# Patient Record
Sex: MALE | Race: WHITE | NOT HISPANIC OR LATINO | Employment: OTHER | URBAN - METROPOLITAN AREA
[De-identification: names, ages, dates, MRNs, and addresses within clinical notes are randomized per-mention and may not be internally consistent; named-entity substitution may affect disease eponyms.]

---

## 2018-07-09 ENCOUNTER — OFFICE VISIT (OUTPATIENT)
Dept: FAMILY MEDICINE CLINIC | Facility: CLINIC | Age: 73
End: 2018-07-09
Payer: MEDICARE

## 2018-07-09 VITALS
HEART RATE: 68 BPM | HEIGHT: 67 IN | OXYGEN SATURATION: 98 % | WEIGHT: 168 LBS | RESPIRATION RATE: 14 BRPM | SYSTOLIC BLOOD PRESSURE: 142 MMHG | TEMPERATURE: 98.3 F | BODY MASS INDEX: 26.37 KG/M2 | DIASTOLIC BLOOD PRESSURE: 78 MMHG

## 2018-07-09 DIAGNOSIS — S51.812D LACERATION OF LEFT FOREARM, SUBSEQUENT ENCOUNTER: Primary | ICD-10-CM

## 2018-07-09 DIAGNOSIS — I10 ESSENTIAL HYPERTENSION: ICD-10-CM

## 2018-07-09 PROCEDURE — 99214 OFFICE O/P EST MOD 30 MIN: CPT | Performed by: INTERNAL MEDICINE

## 2018-07-09 RX ORDER — IBRUTINIB 280 MG/1
TABLET, FILM COATED ORAL
COMMUNITY
Start: 2018-06-26 | End: 2020-01-23 | Stop reason: ALTCHOICE

## 2018-07-09 RX ORDER — NEBIVOLOL 10 MG/1
TABLET ORAL EVERY 24 HOURS
COMMUNITY
Start: 2017-06-27 | End: 2018-08-31 | Stop reason: SURG

## 2018-07-09 RX ORDER — TAMSULOSIN HYDROCHLORIDE 0.4 MG/1
CAPSULE ORAL
COMMUNITY
Start: 2017-01-09 | End: 2020-01-23 | Stop reason: SDUPTHER

## 2018-07-09 RX ORDER — VALSARTAN 160 MG/1
TABLET ORAL
COMMUNITY
Start: 2018-04-25 | End: 2018-07-16

## 2018-07-09 RX ORDER — LEVOFLOXACIN 500 MG/1
500 TABLET, FILM COATED ORAL EVERY 24 HOURS
Qty: 10 TABLET | Refills: 0 | Status: SHIPPED | OUTPATIENT
Start: 2018-07-09 | End: 2018-07-16 | Stop reason: SURG

## 2018-07-09 NOTE — PROGRESS NOTES
Assessment/Plan:         Diagnoses and all orders for this visit:    Laceration of left forearm, subsequent encounter :   Dress up with bacitracin Oint  Keep area dry and Clean   stop keflex  Start :   -     levofloxacin (LEVAQUIN) 500 mg tablet; Take 1 tablet (500 mg total) by mouth every 24 hours for 10 days  No work for 1 week  Elevat Arm as possible  Baby Asa 81 mg daily food   Home P T  For left shoulder /elbow/and wrist  RTc in 1 week for Suture Removal    Essential hypertension : stable  Cont same  Samples of Bystolic 10 mg daily given    Other orders  -     nebivolol (BYSTOLIC) 10 mg tablet; every 24 hours  -     tamsulosin (FLOMAX) 0 4 mg; take 1 capsule by oral route once daily  -     IMBRUVICA 280 MG TABS;   -     valsartan (DIOVAN) 160 mg tablet;   -     Diclofenac Sodium  MG 24 hr tablet;         Subjective:      Patient ID: Franci Espana is a 71 y o  male  Nice 71 Y O man with multiple Medical problems, was doing fairly well till 3 days ago while was working was hit by piece of wood in left forearm, was seen in Er for nice size wound, needed interna and external Suturing    Can not take Td Vaccine due to allergy  Joycie Pata He feels Ok  No New Symptoms  He is On keflex   Joycie Pata The following portions of the patient's history were reviewed and updated as appropriate: allergies, current medications, past family history, past medical history, past social history, past surgical history and problem list     Review of Systems   Constitutional: Negative for chills, fatigue and fever  HENT: Negative for congestion, facial swelling, sore throat, trouble swallowing and voice change  Eyes: Negative for pain, discharge and visual disturbance  Respiratory: Negative for cough, shortness of breath and wheezing  Cardiovascular: Negative for chest pain, palpitations and leg swelling  Gastrointestinal: Negative for abdominal pain, blood in stool, constipation, diarrhea and nausea     Endocrine: Negative for polydipsia, polyphagia and polyuria  Genitourinary: Negative for difficulty urinating, hematuria and urgency  Musculoskeletal: Positive for joint swelling  Negative for arthralgias and myalgias  3 days ago, when Pt  Was working , he injured his left Forearm, and was seen in ER, had Internal and External Sutures,   And was given Keflex    He has allergy to Td vaccine  Syble Kian His left Forearm, is swelling, Echymotic, But no signs of infection    No Other symptoms at all   Skin: Negative for rash  Neurological: Negative for dizziness, tremors, weakness and headaches  Hematological: Negative for adenopathy  Does not bruise/bleed easily  Psychiatric/Behavioral: Negative for dysphoric mood, sleep disturbance and suicidal ideas  Objective:      /78 (BP Location: Right arm, Patient Position: Sitting, Cuff Size: Adult)   Pulse 68   Temp 98 3 °F (36 8 °C) (Oral)   Resp 14   Ht 5' 7" (1 702 m)   Wt 76 2 kg (168 lb)   SpO2 98%   BMI 26 31 kg/m²          Physical Exam   Constitutional: He is oriented to person, place, and time  He appears well-nourished  No distress  HENT:   Head: Normocephalic  Mouth/Throat: Oropharynx is clear and moist  No oropharyngeal exudate  Eyes: Conjunctivae are normal  Pupils are equal, round, and reactive to light  No scleral icterus  Neck: Neck supple  No thyromegaly present  Cardiovascular: Normal rate and normal heart sounds  No murmur heard  Irregular Rhythm  ?? Pt feels Ok    No Symptoms at all      Pulmonary/Chest: Effort normal and breath sounds normal  No respiratory distress  He has no wheezes  He has no rales  Abdominal: Soft  Bowel sounds are normal  He exhibits no distension  There is no tenderness  There is no rebound and no guarding  Musculoskeletal: He exhibits no edema or tenderness  Left forearm   : as above      Lymphadenopathy:     He has no cervical adenopathy  Neurological: He is alert and oriented to person, place, and time   No cranial nerve deficit  Coordination normal    Skin: No rash noted  No erythema  Psychiatric: He has a normal mood and affect

## 2018-07-16 ENCOUNTER — OFFICE VISIT (OUTPATIENT)
Dept: FAMILY MEDICINE CLINIC | Facility: CLINIC | Age: 73
End: 2018-07-16
Payer: MEDICARE

## 2018-07-16 VITALS
BODY MASS INDEX: 26.09 KG/M2 | TEMPERATURE: 97.6 F | HEART RATE: 61 BPM | HEIGHT: 67 IN | WEIGHT: 166.2 LBS | RESPIRATION RATE: 14 BRPM | DIASTOLIC BLOOD PRESSURE: 72 MMHG | OXYGEN SATURATION: 98 % | SYSTOLIC BLOOD PRESSURE: 140 MMHG

## 2018-07-16 DIAGNOSIS — Z48.02 ENCOUNTER FOR REMOVAL OF SUTURES: ICD-10-CM

## 2018-07-16 DIAGNOSIS — S41.132D: Primary | ICD-10-CM

## 2018-07-16 PROCEDURE — 99213 OFFICE O/P EST LOW 20 MIN: CPT | Performed by: INTERNAL MEDICINE

## 2018-07-16 NOTE — PROGRESS NOTES
Assessment/Plan:         Diagnoses and all orders for this visit:    Puncture wound of left arm with complication, subsequent encounter : No Sign of Infection  Jennifer Zabala Up levaquin  10 Sutures removed Today  RTC in 4-5 days to remove the Rest    Encounter for removal of sutures: as above  Keep area dry and clean   Re dress         Subjective:      Patient ID: Itz Villatoro is a 71 y o  male  Nice 71 Y O man with multiple medical problems, is here today for regular check up and re check on laceration Left forearm     It is healing up nicely    Swelling erythema Tenderness all Improved nicely    No New Symptoms           The following portions of the patient's history were reviewed and updated as appropriate: allergies, current medications, past family history, past medical history, past social history, past surgical history and problem list     Review of Systems   Constitutional: Negative for chills, fatigue and fever  HENT: Negative for congestion, facial swelling, sore throat, trouble swallowing and voice change  Eyes: Negative for pain, discharge and visual disturbance  Respiratory: Negative for cough, shortness of breath and wheezing  Cardiovascular: Negative for chest pain, palpitations and leg swelling  Gastrointestinal: Negative for abdominal pain, blood in stool, constipation, diarrhea and nausea  Endocrine: Negative for polydipsia, polyphagia and polyuria  Genitourinary: Negative for difficulty urinating, hematuria and urgency  Musculoskeletal: Negative for arthralgias and myalgias  Skin: Negative for rash  Nice size laceration left forearm, is healing up nicely    Some sutures ready to be removed      Neurological: Negative for dizziness, tremors, weakness and headaches  Hematological: Negative for adenopathy  Does not bruise/bleed easily  Psychiatric/Behavioral: Negative for dysphoric mood, sleep disturbance and suicidal ideas           Objective:      /72 (BP Location: Left arm, Patient Position: Sitting, Cuff Size: Adult)   Pulse 61   Temp 97 6 °F (36 4 °C) (Oral)   Resp 14   Ht 5' 7" (1 702 m)   Wt 75 4 kg (166 lb 3 2 oz)   SpO2 98%   BMI 26 03 kg/m²          Physical Exam   Constitutional: He is oriented to person, place, and time  He appears well-nourished  No distress  HENT:   Head: Normocephalic  Mouth/Throat: Oropharynx is clear and moist  No oropharyngeal exudate  Eyes: Conjunctivae are normal  Pupils are equal, round, and reactive to light  No scleral icterus  Neck: Neck supple  No thyromegaly present  Cardiovascular: Normal rate, regular rhythm and normal heart sounds  No murmur heard  Pulmonary/Chest: Effort normal and breath sounds normal  No respiratory distress  He has no wheezes  He has no rales  Abdominal: Soft  Bowel sounds are normal  He exhibits no distension  There is no tenderness  There is no rebound and no guarding  Musculoskeletal: He exhibits no edema or tenderness  Lymphadenopathy:     He has no cervical adenopathy  Neurological: He is alert and oriented to person, place, and time  No cranial nerve deficit  Coordination normal    Skin: No rash noted  No erythema  As above    Psychiatric: He has a normal mood and affect

## 2018-07-19 ENCOUNTER — OFFICE VISIT (OUTPATIENT)
Dept: FAMILY MEDICINE CLINIC | Facility: CLINIC | Age: 73
End: 2018-07-19
Payer: MEDICARE

## 2018-07-19 VITALS
BODY MASS INDEX: 25.58 KG/M2 | TEMPERATURE: 98.7 F | WEIGHT: 163 LBS | HEART RATE: 76 BPM | SYSTOLIC BLOOD PRESSURE: 124 MMHG | HEIGHT: 67 IN | DIASTOLIC BLOOD PRESSURE: 78 MMHG

## 2018-07-19 DIAGNOSIS — Z48.02 VISIT FOR SUTURE REMOVAL: Primary | ICD-10-CM

## 2018-07-19 PROCEDURE — 99213 OFFICE O/P EST LOW 20 MIN: CPT | Performed by: INTERNAL MEDICINE

## 2018-07-19 NOTE — PROGRESS NOTES
Assessment/Plan:         There are no diagnoses linked to this encounter  Wound Left Forearm :  12 Sutures removed today  Keep area dry and Clean  Bandage up now , keep it covered for 24 hours  RTC in 2-3 mos for regular check up  Subjective:      Patient ID: Will Jackson is a 71 y o  male  Nice 71 Y O Man with H/O HTN, and a large laceration/Wound left forearm, healing up nicely    Today is here to remove the rest of the sutures    No other symptoms         Suture / Staple Removal         The following portions of the patient's history were reviewed and updated as appropriate: allergies, current medications, past family history, past medical history, past social history, past surgical history and problem list     Review of Systems   Constitutional: Negative for chills, fatigue and fever  HENT: Negative for congestion, facial swelling, sore throat, trouble swallowing and voice change  Eyes: Negative for pain, discharge and visual disturbance  Respiratory: Negative for cough, shortness of breath and wheezing  Cardiovascular: Negative for chest pain, palpitations and leg swelling  Gastrointestinal: Negative for abdominal pain, blood in stool, constipation, diarrhea and nausea  Endocrine: Negative for polydipsia, polyphagia and polyuria  Genitourinary: Negative for difficulty urinating, hematuria and urgency  Musculoskeletal: Negative for arthralgias and myalgias  Skin: Negative for rash  Neurological: Negative for dizziness, tremors, weakness and headaches  Hematological: Negative for adenopathy  Does not bruise/bleed easily  Psychiatric/Behavioral: Negative for dysphoric mood, sleep disturbance and suicidal ideas           Objective:      /78 (BP Location: Left arm, Patient Position: Sitting, Cuff Size: Large)   Pulse 76   Temp 98 7 °F (37 1 °C) (Oral)   Ht 5' 7" (1 702 m)   Wt 73 9 kg (163 lb)   BMI 25 53 kg/m²          Physical Exam   Constitutional: He is oriented to person, place, and time  He appears well-nourished  No distress  HENT:   Head: Normocephalic  Mouth/Throat: Oropharynx is clear and moist  No oropharyngeal exudate  Eyes: Conjunctivae are normal  Pupils are equal, round, and reactive to light  No scleral icterus  Neck: Neck supple  No thyromegaly present  Cardiovascular: Normal rate, regular rhythm and normal heart sounds  No murmur heard  Pulmonary/Chest: Effort normal and breath sounds normal  No respiratory distress  He has no wheezes  He has no rales  Abdominal: Soft  Bowel sounds are normal  He exhibits no distension  There is no tenderness  There is no rebound and no guarding  Musculoskeletal: He exhibits no edema or tenderness  Lymphadenopathy:     He has no cervical adenopathy  Neurological: He is alert and oriented to person, place, and time  No cranial nerve deficit  Coordination normal    Skin: No rash noted  No erythema  12 Sutures left in left forearm  Narciso [place] Healing up nicely    Psychiatric: He has a normal mood and affect

## 2018-08-31 ENCOUNTER — OFFICE VISIT (OUTPATIENT)
Dept: FAMILY MEDICINE CLINIC | Facility: CLINIC | Age: 73
End: 2018-08-31
Payer: MEDICARE

## 2018-08-31 VITALS
SYSTOLIC BLOOD PRESSURE: 154 MMHG | TEMPERATURE: 96.8 F | DIASTOLIC BLOOD PRESSURE: 88 MMHG | WEIGHT: 163 LBS | BODY MASS INDEX: 25.53 KG/M2

## 2018-08-31 DIAGNOSIS — R21 RASH: Primary | ICD-10-CM

## 2018-08-31 DIAGNOSIS — I10 ESSENTIAL HYPERTENSION: ICD-10-CM

## 2018-08-31 PROCEDURE — 99213 OFFICE O/P EST LOW 20 MIN: CPT | Performed by: INTERNAL MEDICINE

## 2018-08-31 RX ORDER — PREDNISONE 10 MG/1
10 TABLET ORAL DAILY
Qty: 20 TABLET | Refills: 0 | Status: SHIPPED | OUTPATIENT
Start: 2018-08-31 | End: 2018-09-05 | Stop reason: SDUPTHER

## 2018-08-31 RX ORDER — NEBIVOLOL 10 MG/1
TABLET ORAL
COMMUNITY
Start: 2017-06-27 | End: 2019-01-31 | Stop reason: SDUPTHER

## 2018-08-31 RX ORDER — VALACYCLOVIR HYDROCHLORIDE 1 G/1
1000 TABLET, FILM COATED ORAL 2 TIMES DAILY
Qty: 20 TABLET | Refills: 0 | Status: SHIPPED | OUTPATIENT
Start: 2018-08-31 | End: 2019-09-12 | Stop reason: ALTCHOICE

## 2018-08-31 NOTE — PROGRESS NOTES
Assessment/Plan:         Diagnoses and all orders for this visit:    Rash: ?? R/O Skin infiltrate with leukemia cells : ??Try :  -     predniSONE 10 mg tablet; Take 1 tablet (10 mg total) by mouth   Chiki Balint   20/0  -     valACYclovir (VALTREX) 1,000 mg tablet; Take 1 tablet (1,000 mg total) by mouth 2 (two) times a day for 10 days  Fuw  Oncology  RTC in 1-2 mos w Blood work  Consider dermatology cosult  -     Hepatic function panel; Future  -     Basic metabolic panel; Future  -     TSH, 3rd generation; Future  -     Sedimentation rate, automated; Future  -     Herpes Simplex Virus (HSV) Types 1 and 2, JANE; Future    Essential hypertension: Stable  Samples Bystolic 10 mg Given  Other orders  -     nebivolol (BYSTOLIC) 10 mg tablet; every 24 hours        Subjective:      Patient ID: Blanka Stokes is a 71 y o  male  71 Y O man with H/O HTN,CML,  Is here for increasing rash on and off for last 2 mos     No other symptoms    Med list reviewed w pt in Detail  ,       Rash   Pertinent negatives include no congestion, cough, diarrhea, eye pain, fatigue, fever, shortness of breath or sore throat  The following portions of the patient's history were reviewed and updated as appropriate: allergies, current medications, past family history, past medical history, past social history, past surgical history and problem list     Review of Systems   Constitutional: Negative for chills, fatigue and fever  HENT: Negative for congestion, facial swelling, sore throat, trouble swallowing and voice change  Eyes: Negative for pain, discharge and visual disturbance  Respiratory: Negative for cough, shortness of breath and wheezing  Cardiovascular: Negative for chest pain, palpitations and leg swelling  Gastrointestinal: Negative for abdominal pain, blood in stool, constipation, diarrhea and nausea  Endocrine: Negative for polydipsia, polyphagia and polyuria     Genitourinary: Negative for difficulty urinating, hematuria and urgency  Musculoskeletal: Negative for arthralgias and myalgias  Skin: Positive for rash  For last 2 mos , on and off rash ? ? Pt is on Chemo tx for his Leukemia      Neurological: Negative for dizziness, tremors, weakness and headaches  Hematological: Negative for adenopathy  Does not bruise/bleed easily  Psychiatric/Behavioral: Negative for dysphoric mood, sleep disturbance and suicidal ideas  Objective:      /88 (BP Location: Left arm, Patient Position: Sitting, Cuff Size: Standard)   Temp (!) 96 8 °F (36 °C) (Oral)   Wt 73 9 kg (163 lb)   BMI 25 53 kg/m²          Physical Exam   Constitutional: He is oriented to person, place, and time  He appears well-nourished  No distress  HENT:   Head: Normocephalic  Mouth/Throat: Oropharynx is clear and moist  No oropharyngeal exudate  Eyes: Conjunctivae are normal  Pupils are equal, round, and reactive to light  No scleral icterus  Neck: Neck supple  No thyromegaly present  Cardiovascular: Normal rate, regular rhythm and normal heart sounds  No murmur heard  Pulmonary/Chest: Effort normal and breath sounds normal  No respiratory distress  He has no wheezes  He has no rales  Abdominal: Soft  Bowel sounds are normal  He exhibits no distension  There is no tenderness  There is no rebound and no guarding  Musculoskeletal: He exhibits tenderness  He exhibits no edema  Lymphadenopathy:     He has no cervical adenopathy  Neurological: He is alert and oriented to person, place, and time  No cranial nerve deficit  Coordination normal    Skin: Rash noted  No erythema  Different shape size rash upper exts and lower exts ? ??   Psychiatric: He has a normal mood and affect

## 2018-09-05 DIAGNOSIS — R21 RASH: ICD-10-CM

## 2018-09-05 RX ORDER — PREDNISONE 10 MG/1
10 TABLET ORAL DAILY
Qty: 20 TABLET | Refills: 0 | Status: SHIPPED | OUTPATIENT
Start: 2018-09-05 | End: 2019-09-12 | Stop reason: ALTCHOICE

## 2018-10-05 ENCOUNTER — OFFICE VISIT (OUTPATIENT)
Dept: FAMILY MEDICINE CLINIC | Facility: CLINIC | Age: 73
End: 2018-10-05
Payer: MEDICARE

## 2018-10-05 VITALS
BODY MASS INDEX: 26.06 KG/M2 | WEIGHT: 166 LBS | HEIGHT: 67 IN | DIASTOLIC BLOOD PRESSURE: 80 MMHG | TEMPERATURE: 97.9 F | SYSTOLIC BLOOD PRESSURE: 140 MMHG

## 2018-10-05 DIAGNOSIS — Z23 NEED FOR VACCINATION: ICD-10-CM

## 2018-10-05 DIAGNOSIS — L03.818 CELLULITIS OF OTHER SPECIFIED SITE: Primary | ICD-10-CM

## 2018-10-05 DIAGNOSIS — I10 ESSENTIAL HYPERTENSION: ICD-10-CM

## 2018-10-05 PROCEDURE — G0008 ADMIN INFLUENZA VIRUS VAC: HCPCS | Performed by: INTERNAL MEDICINE

## 2018-10-05 PROCEDURE — 99213 OFFICE O/P EST LOW 20 MIN: CPT | Performed by: INTERNAL MEDICINE

## 2018-10-05 PROCEDURE — 90662 IIV NO PRSV INCREASED AG IM: CPT | Performed by: INTERNAL MEDICINE

## 2018-10-05 RX ORDER — AMOXICILLIN AND CLAVULANATE POTASSIUM 875; 125 MG/1; MG/1
1 TABLET, FILM COATED ORAL EVERY 12 HOURS SCHEDULED
Qty: 20 TABLET | Refills: 0 | Status: SHIPPED | OUTPATIENT
Start: 2018-10-05 | End: 2018-10-15

## 2018-10-05 NOTE — PROGRESS NOTES
Assessment/Plan:         Diagnoses and all orders for this visit:    Cellulitis of Left Foot :  -     amoxicillin-clavulanate (AUGMENTIN) 875-125 mg per tablet; Take 1 tablet by mouth every 12 (twelve) hours for 10 days With food  -     Ambulatory referral to Podiatry; Future    Essential hypertension : Stable  Continue same  RTC in 3mos w blood work  -     influenza vaccine, 5775-6673, high-dose, PF 0 5 mL, for patients 65 yr+ (FLUZONE HIGH-DOSE)  -     Ambulatory referral to Podiatry; Future    Need for vaccination        Subjective:      Patient ID: Amador Dominguez is a 71 y o  male  71 Y O Man with H/O HTN, is here for Increasing pain,swelling,erythema,  Left foot/toe for few days and getting worse    No recent blood work    Med List reviewed w pt in Detail    No other issues      Toe Pain          The following portions of the patient's history were reviewed and updated as appropriate: allergies, current medications, past family history, past medical history, past social history, past surgical history and problem list     Review of Systems   Constitutional: Negative for chills, fatigue and fever  HENT: Negative for congestion, facial swelling, sore throat, trouble swallowing and voice change  Eyes: Negative for pain, discharge and visual disturbance  Respiratory: Negative for cough, shortness of breath and wheezing  Cardiovascular: Negative for chest pain, palpitations and leg swelling  Gastrointestinal: Negative for abdominal pain, blood in stool, constipation, diarrhea and nausea  Endocrine: Negative for polydipsia, polyphagia and polyuria  Genitourinary: Negative for difficulty urinating, hematuria and urgency  Musculoskeletal: Negative for arthralgias and myalgias  Skin: Negative for rash  Left foot second toe infected    Neurological: Negative for dizziness, tremors, weakness and headaches  Hematological: Negative for adenopathy  Does not bruise/bleed easily  Psychiatric/Behavioral: Negative for dysphoric mood, sleep disturbance and suicidal ideas  Objective:      /80 (BP Location: Left arm, Patient Position: Sitting, Cuff Size: Standard)   Temp 97 9 °F (36 6 °C) (Oral)   Ht 5' 7" (1 702 m)   Wt 75 3 kg (166 lb)   BMI 26 00 kg/m²          Physical Exam   Constitutional: He is oriented to person, place, and time  He appears well-nourished  No distress  HENT:   Head: Normocephalic  Mouth/Throat: Oropharynx is clear and moist  No oropharyngeal exudate  Eyes: Pupils are equal, round, and reactive to light  Conjunctivae are normal  No scleral icterus  Neck: Neck supple  No thyromegaly present  Cardiovascular: Normal rate, regular rhythm and normal heart sounds  No murmur heard  Pulmonary/Chest: Effort normal and breath sounds normal  No respiratory distress  He has no wheezes  He has no rales  Abdominal: Soft  Bowel sounds are normal  He exhibits no distension  There is no tenderness  There is no rebound and no guarding  Musculoskeletal: He exhibits no edema or tenderness  Lymphadenopathy:     He has no cervical adenopathy  Neurological: He is alert and oriented to person, place, and time  No cranial nerve deficit  Coordination normal    Skin: No rash noted  There is erythema  No pallor  As above    Left foot cellulitis   Psychiatric: He has a normal mood and affect

## 2018-12-10 DIAGNOSIS — M19.90 ARTHRITIS: Primary | ICD-10-CM

## 2018-12-20 ENCOUNTER — APPOINTMENT (OUTPATIENT)
Dept: LAB | Facility: IMAGING CENTER | Age: 73
End: 2018-12-20
Payer: MEDICARE

## 2018-12-20 ENCOUNTER — TRANSCRIBE ORDERS (OUTPATIENT)
Dept: ADMINISTRATIVE | Facility: HOSPITAL | Age: 73
End: 2018-12-20

## 2018-12-20 DIAGNOSIS — C91.10 CHRONIC LYMPHOCYTIC LEUKEMIA (HCC): Primary | ICD-10-CM

## 2018-12-20 DIAGNOSIS — C91.10 CHRONIC LYMPHOCYTIC LEUKEMIA (HCC): ICD-10-CM

## 2018-12-20 DIAGNOSIS — R21 RASH: ICD-10-CM

## 2018-12-20 LAB
ALBUMIN SERPL BCP-MCNC: 3.4 G/DL (ref 3.5–5)
ALP SERPL-CCNC: 84 U/L (ref 46–116)
ALT SERPL W P-5'-P-CCNC: 25 U/L (ref 12–78)
ANION GAP SERPL CALCULATED.3IONS-SCNC: 7 MMOL/L (ref 4–13)
AST SERPL W P-5'-P-CCNC: 18 U/L (ref 5–45)
BASOPHILS # BLD MANUAL: 0.17 THOUSAND/UL (ref 0–0.1)
BASOPHILS NFR MAR MANUAL: 4 % (ref 0–1)
BILIRUB SERPL-MCNC: 0.37 MG/DL (ref 0.2–1)
BUN SERPL-MCNC: 11 MG/DL (ref 5–25)
CALCIUM SERPL-MCNC: 8.7 MG/DL (ref 8.3–10.1)
CHLORIDE SERPL-SCNC: 107 MMOL/L (ref 100–108)
CO2 SERPL-SCNC: 26 MMOL/L (ref 21–32)
CREAT SERPL-MCNC: 0.93 MG/DL (ref 0.6–1.3)
EOSINOPHIL # BLD MANUAL: 0.13 THOUSAND/UL (ref 0–0.4)
EOSINOPHIL NFR BLD MANUAL: 3 % (ref 0–6)
ERYTHROCYTE [DISTWIDTH] IN BLOOD BY AUTOMATED COUNT: 13.9 % (ref 11.6–15.1)
ERYTHROCYTE [SEDIMENTATION RATE] IN BLOOD: 2 MM/HOUR (ref 0–10)
GFR SERPL CREATININE-BSD FRML MDRD: 82 ML/MIN/1.73SQ M
GLUCOSE SERPL-MCNC: 93 MG/DL (ref 65–140)
HCT VFR BLD AUTO: 46.5 % (ref 36.5–49.3)
HGB BLD-MCNC: 15.4 G/DL (ref 12–17)
LDH SERPL-CCNC: 241 U/L (ref 81–234)
LYMPHOCYTES # BLD AUTO: 0.83 THOUSAND/UL (ref 0.6–4.47)
LYMPHOCYTES # BLD AUTO: 19 % (ref 14–44)
MCH RBC QN AUTO: 29.7 PG (ref 26.8–34.3)
MCHC RBC AUTO-ENTMCNC: 33.1 G/DL (ref 31.4–37.4)
MCV RBC AUTO: 90 FL (ref 82–98)
MONOCYTES # BLD AUTO: 0.48 THOUSAND/UL (ref 0–1.22)
MONOCYTES NFR BLD: 11 % (ref 4–12)
NEUTROPHILS # BLD MANUAL: 2.74 THOUSAND/UL (ref 1.85–7.62)
NEUTS BAND NFR BLD MANUAL: 17 % (ref 0–8)
NEUTS SEG NFR BLD AUTO: 46 % (ref 43–75)
NRBC BLD AUTO-RTO: 0 /100 WBCS
PLATELET # BLD AUTO: 254 THOUSANDS/UL (ref 149–390)
PLATELET BLD QL SMEAR: ADEQUATE
PMV BLD AUTO: 10.8 FL (ref 8.9–12.7)
POIKILOCYTOSIS BLD QL SMEAR: PRESENT
POTASSIUM SERPL-SCNC: 4.2 MMOL/L (ref 3.5–5.3)
PROT SERPL-MCNC: 6.3 G/DL (ref 6.4–8.2)
RBC # BLD AUTO: 5.19 MILLION/UL (ref 3.88–5.62)
RBC MORPH BLD: PRESENT
SODIUM SERPL-SCNC: 140 MMOL/L (ref 136–145)
TSH SERPL DL<=0.05 MIU/L-ACNC: 2.25 UIU/ML (ref 0.36–3.74)
WBC # BLD AUTO: 4.35 THOUSAND/UL (ref 4.31–10.16)

## 2018-12-20 PROCEDURE — 36415 COLL VENOUS BLD VENIPUNCTURE: CPT

## 2018-12-20 PROCEDURE — 84443 ASSAY THYROID STIM HORMONE: CPT

## 2018-12-20 PROCEDURE — 83615 LACTATE (LD) (LDH) ENZYME: CPT

## 2018-12-20 PROCEDURE — 80053 COMPREHEN METABOLIC PANEL: CPT

## 2018-12-20 PROCEDURE — 85007 BL SMEAR W/DIFF WBC COUNT: CPT

## 2018-12-20 PROCEDURE — 85652 RBC SED RATE AUTOMATED: CPT

## 2018-12-20 PROCEDURE — 87529 HSV DNA AMP PROBE: CPT

## 2018-12-20 PROCEDURE — 85027 COMPLETE CBC AUTOMATED: CPT

## 2018-12-24 LAB
HSV1 DNA SPEC QL NAA+PROBE: NEGATIVE
HSV2 DNA SPEC QL NAA+PROBE: NEGATIVE

## 2019-01-31 DIAGNOSIS — I10 ESSENTIAL HYPERTENSION, MALIGNANT: Primary | ICD-10-CM

## 2019-01-31 RX ORDER — NEBIVOLOL 10 MG/1
10 TABLET ORAL DAILY
Qty: 90 TABLET | Refills: 1 | Status: SHIPPED | OUTPATIENT
Start: 2019-01-31 | End: 2019-11-10 | Stop reason: SDUPTHER

## 2019-07-12 DIAGNOSIS — M19.90 ARTHRITIS: ICD-10-CM

## 2019-07-17 ENCOUNTER — TELEPHONE (OUTPATIENT)
Dept: FAMILY MEDICINE CLINIC | Facility: CLINIC | Age: 74
End: 2019-07-17

## 2019-07-17 DIAGNOSIS — H66.90 EAR INFECTION: Primary | ICD-10-CM

## 2019-07-21 RX ORDER — OFLOXACIN 3 MG/ML
5 SOLUTION AURICULAR (OTIC) 2 TIMES DAILY
Qty: 5 ML | Refills: 0 | Status: SHIPPED | OUTPATIENT
Start: 2019-07-21 | End: 2019-11-27

## 2019-09-12 ENCOUNTER — OFFICE VISIT (OUTPATIENT)
Dept: FAMILY MEDICINE CLINIC | Facility: CLINIC | Age: 74
End: 2019-09-12
Payer: MEDICARE

## 2019-09-12 VITALS
TEMPERATURE: 97.5 F | DIASTOLIC BLOOD PRESSURE: 88 MMHG | OXYGEN SATURATION: 98 % | RESPIRATION RATE: 14 BRPM | HEART RATE: 98 BPM | WEIGHT: 159 LBS | BODY MASS INDEX: 24.96 KG/M2 | HEIGHT: 67 IN | SYSTOLIC BLOOD PRESSURE: 128 MMHG

## 2019-09-12 DIAGNOSIS — Z12.11 SCREENING FOR COLON CANCER: ICD-10-CM

## 2019-09-12 DIAGNOSIS — Z11.59 NEED FOR HEPATITIS C SCREENING TEST: ICD-10-CM

## 2019-09-12 DIAGNOSIS — R00.2 PALPITATION: ICD-10-CM

## 2019-09-12 DIAGNOSIS — Z00.01 ENCOUNTER FOR GENERAL ADULT MEDICAL EXAMINATION WITH ABNORMAL FINDINGS: Primary | ICD-10-CM

## 2019-09-12 DIAGNOSIS — I10 ESSENTIAL HYPERTENSION: ICD-10-CM

## 2019-09-12 DIAGNOSIS — M54.2 NECK PAIN: ICD-10-CM

## 2019-09-12 PROCEDURE — 99214 OFFICE O/P EST MOD 30 MIN: CPT | Performed by: INTERNAL MEDICINE

## 2019-09-12 PROCEDURE — 93000 ELECTROCARDIOGRAM COMPLETE: CPT | Performed by: INTERNAL MEDICINE

## 2019-09-12 PROCEDURE — G0439 PPPS, SUBSEQ VISIT: HCPCS | Performed by: INTERNAL MEDICINE

## 2019-09-12 RX ORDER — LIDOCAINE 50 MG/G
1 PATCH TOPICAL DAILY
Qty: 30 PATCH | Refills: 1 | Status: SHIPPED | OUTPATIENT
Start: 2019-09-12 | End: 2019-11-27

## 2019-09-12 RX ORDER — CELECOXIB 200 MG/1
200 CAPSULE ORAL DAILY
Qty: 30 CAPSULE | Refills: 2 | Status: SHIPPED | OUTPATIENT
Start: 2019-09-12 | End: 2020-01-23

## 2019-09-12 RX ORDER — METHOCARBAMOL 500 MG/1
500 TABLET, FILM COATED ORAL 2 TIMES DAILY WITH MEALS
Qty: 60 TABLET | Refills: 2 | Status: SHIPPED | OUTPATIENT
Start: 2019-09-12 | End: 2019-11-27

## 2019-09-12 NOTE — PATIENT INSTRUCTIONS
Medicare Preventive Visit Patient Instructions  Thank you for completing your Welcome to Medicare Visit or Medicare Annual Wellness Visit today  Your next wellness visit will be due in one year (9/12/2020)  The screening/preventive services that you may require over the next 5-10 years are detailed below  Some tests may not apply to you based off risk factors and/or age  Screening tests ordered at today's visit but not completed yet may show as past due  Also, please note that scanned in results may not display below  Preventive Screenings:  Service Recommendations Previous Testing/Comments   Colorectal Cancer Screening  · Colonoscopy    · Fecal Occult Blood Test (FOBT)/Fecal Immunochemical Test (FIT)  · Fecal DNA/Cologuard Test  · Flexible Sigmoidoscopy Age: 54-65 years old   Colonoscopy: every 10 years (May be performed more frequently if at higher risk)  OR  FOBT/FIT: every 1 year  OR  Cologuard: every 3 years  OR  Sigmoidoscopy: every 5 years  Screening may be recommended earlier than age 48 if at higher risk for colorectal cancer  Also, an individualized decision between you and your healthcare provider will decide whether screening between the ages of 74-80 would be appropriate   Colonoscopy: 11/07/2013  FOBT/FIT: Not on file  Cologuard: Not on file  Sigmoidoscopy: Not on file    Screening Current     Prostate Cancer Screening Individualized decision between patient and health care provider in men between ages of 53-78   Medicare will cover every 12 months beginning on the day after your 50th birthday PSA: No results in last 5 years          Hepatitis C Screening Once for adults born between [address] and 1964  More frequently in patients at high risk for Hepatitis C Hep C Antibody: Not on file       Diabetes Screening 1-2 times per year if you're at risk for diabetes or have pre-diabetes Fasting glucose: No results in last 5 years   A1C: No results in last 5 years    Screening Current   Cholesterol Screening Once every 5 years if you don't have a lipid disorder  May order more often based on risk factors  Lipid panel: Not on file    Screening Not Indicated  History Lipid Disorder      Other Preventive Screenings Covered by Medicare:  1  Abdominal Aortic Aneurysm (AAA) Screening: covered once if your at risk  You're considered to be at risk if you have a family history of AAA or a male between the age of 73-68 who smoking at least 100 cigarettes in your lifetime  2  Lung Cancer Screening: covers low dose CT scan once per year if you meet all of the following conditions: (1) Age 50-69; (2) No signs or symptoms of lung cancer; (3) Current smoker or have quit smoking within the last 15 years; (4) You have a tobacco smoking history of at least 30 pack years (packs per day x number of years you smoked); (5) You get a written order from a healthcare provider  3  Glaucoma Screening: covered annually if you're considered high risk: (1) You have diabetes OR (2) Family history of glaucoma OR (3)  aged 48 and older OR (3)  American aged 72 and older  3  Osteoporosis Screening: covered every 2 years if you meet one of the following conditions: (1) Have a vertebral abnormality; (2) On glucocorticoid therapy for more than 3 months; (3) Have primary hyperparathyroidism; (4) On osteoporosis medications and need to assess response to drug therapy  5  HIV Screening: covered annually if you're between the age of 12-76  Also covered annually if you are younger than 13 and older than 72 with risk factors for HIV infection  For pregnant patients, it is covered up to 3 times per pregnancy      Immunizations:  Immunization Recommendations   Influenza Vaccine Annual influenza vaccination during flu season is recommended for all persons aged >= 6 months who do not have contraindications   Pneumococcal Vaccine (Prevnar and Pneumovax)  * Prevnar = PCV13  * Pneumovax = PPSV23 Adults 25-60 years old: 1-3 doses may be recommended based on certain risk factors  Adults 72 years old: Prevnar (PCV13) vaccine recommended followed by Pneumovax (PPSV23) vaccine  If already received PPSV23 since turning 65, then PCV13 recommended at least one year after PPSV23 dose  Hepatitis B Vaccine 3 dose series if at intermediate or high risk (ex: diabetes, end stage renal disease, liver disease)   Tetanus (Td) Vaccine - COST NOT COVERED BY MEDICARE PART B Following completion of primary series, a booster dose should be given every 10 years to maintain immunity against tetanus  Td may also be given as tetanus wound prophylaxis  Tdap Vaccine - COST NOT COVERED BY MEDICARE PART B Recommended at least once for all adults  For pregnant patients, recommended with each pregnancy  Shingles Vaccine (Shingrix) - COST NOT COVERED BY MEDICARE PART B  2 shot series recommended in those aged 48 and above     Health Maintenance Due:      Topic Date Due    Hepatitis C Screening  1945    CRC Screening: Colonoscopy  11/07/2018     Immunizations Due:      Topic Date Due    INFLUENZA VACCINE  07/01/2019     Advance Directives   What are advance directives? Advance directives are legal documents that state your wishes and plans for medical care  These plans are made ahead of time in case you lose your ability to make decisions for yourself  Advance directives can apply to any medical decision, such as the treatments you want, and if you want to donate organs  What are the types of advance directives? There are many types of advance directives, and each state has rules about how to use them  You may choose a combination of any of the following:  · Living will: This is a written record of the treatment you want  You can also choose which treatments you do not want, which to limit, and which to stop at a certain time  This includes surgery, medicine, IV fluid, and tube feedings  · Durable power of  for healthcare [facility]):   This is a written record that states who you want to make healthcare choices for you when you are unable to make them for yourself  This person, called a proxy, is usually a family member or a friend  You may choose more than 1 proxy  · Do not resuscitate (DNR) order:  A DNR order is used in case your heart stops beating or you stop breathing  It is a request not to have certain forms of treatment, such as CPR  A DNR order may be included in other types of advance directives  · Medical directive: This covers the care that you want if you are in a coma, near death, or unable to make decisions for yourself  You can list the treatments you want for each condition  Treatment may include pain medicine, surgery, blood transfusions, dialysis, IV or tube feedings, and a ventilator (breathing machine)  · Values history: This document has questions about your views, beliefs, and how you feel and think about life  This information can help others choose the care that you would choose  Why are advance directives important? An advance directive helps you control your care  Although spoken wishes may be used, it is better to have your wishes written down  Spoken wishes can be misunderstood, or not followed  Treatments may be given even if you do not want them  An advance directive may make it easier for your family to make difficult choices about your care  Fall Prevention    Fall prevention  includes ways to make your home and other areas safer  It also includes ways you can move more carefully to prevent a fall  Health conditions that cause changes in your blood pressure, vision, or muscle strength and coordination may increase your risk for falls  Medicines may also increase your risk for falls if they make you dizzy, weak, or sleepy  Fall prevention tips:   · Stand or sit up slowly  · Use assistive devices as directed  · Wear shoes that fit well and have soles that   · Wear a personal alarm  · Stay active  · Manage your medical conditions  Home Safety Tips:  · Add items to prevent falls in the bathroom  · Keep paths clear  · Install bright lights in your home  · Keep items you use often on shelves within reach  · Paint or place reflective tape on the edges of your stairs  Weight Management   Why it is important to manage your weight:  Being overweight increases your risk of health conditions such as heart disease, high blood pressure, type 2 diabetes, and certain types of cancer  It can also increase your risk for osteoarthritis, sleep apnea, and other respiratory problems  Aim for a slow, steady weight loss  Even a small amount of weight loss can lower your risk of health problems  How to lose weight safely:  A safe and healthy way to lose weight is to eat fewer calories and get regular exercise  You can lose up about 1 pound a week by decreasing the number of calories you eat by 500 calories each day  Healthy meal plan for weight management:  A healthy meal plan includes a variety of foods, contains fewer calories, and helps you stay healthy  A healthy meal plan includes the following:  · Eat whole-grain foods more often  A healthy meal plan should contain fiber  Fiber is the part of grains, fruits, and vegetables that is not broken down by your body  Whole-grain foods are healthy and provide extra fiber in your diet  Some examples of whole-grain foods are whole-wheat breads and pastas, oatmeal, brown rice, and bulgur  · Eat a variety of vegetables every day  Include dark, leafy greens such as spinach, kale, donny greens, and mustard greens  Eat yellow and orange vegetables such as carrots, sweet potatoes, and winter squash  · Eat a variety of fruits every day  Choose fresh or canned fruit (canned in its own juice or light syrup) instead of juice  Fruit juice has very little or no fiber  · Eat low-fat dairy foods  Drink fat-free (skim) milk or 1% milk   Eat fat-free yogurt and low-fat cottage cheese  Try low-fat cheeses such as mozzarella and other reduced-fat cheeses  · Choose meat and other protein foods that are low in fat  Choose beans or other legumes such as split peas or lentils  Choose fish, skinless poultry (chicken or turkey), or lean cuts of red meat (beef or pork)  Before you cook meat or poultry, cut off any visible fat  · Use less fat and oil  Try baking foods instead of frying them  Add less fat, such as margarine, sour cream, regular salad dressing and mayonnaise to foods  Eat fewer high-fat foods  Some examples of high-fat foods include french fries, doughnuts, ice cream, and cakes  · Eat fewer sweets  Limit foods and drinks that are high in sugar  This includes candy, cookies, regular soda, and sweetened drinks  Exercise:  Exercise at least 30 minutes per day on most days of the week  Some examples of exercise include walking, biking, dancing, and swimming  You can also fit in more physical activity by taking the stairs instead of the elevator or parking farther away from stores  Ask your healthcare provider about the best exercise plan for you  © Copyright [employer] 2018 Information is for End User's use only and may not be sold, redistributed or otherwise used for commercial purposes  All illustrations and images included in CareNotes® are the copyrighted property of [employer] M [employer]  or Wes Rosa  Low Fat Diet   AMBULATORY CARE:   A low-fat diet  is an eating plan that is low in total fat, unhealthy fat, and cholesterol  You may need to follow a low-fat diet if you have trouble digesting or absorbing fat  You may also need to follow this diet if you have high cholesterol  You can also lower your cholesterol by increasing the amount of fiber in your diet  Soluble fiber is a type of fiber that helps to decrease cholesterol levels  Different types of fat in food:   · Limit unhealthy fats    A diet that is high in cholesterol, saturated fat, and trans fat may cause unhealthy cholesterol levels  Unhealthy cholesterol levels increase your risk of heart disease  ¨ Cholesterol:  Limit intake of cholesterol to less than 200 mg per day  Cholesterol is found in meat, eggs, and dairy  ¨ Saturated fat:  Limit saturated fat to less than 7% of your total daily calories  Ask your dietitian how many calories you need each day  Saturated fat is found in butter, cheese, ice cream, whole milk, and palm oil  Saturated fat is also found in meat, such as beef, pork, chicken skin, and processed meats  Processed meats include sausage, hot dogs, and bologna  ¨ Trans fat:  Avoid trans fat as much as possible  Trans fat is used in fried and baked foods  Foods that say trans fat free on the label may still have up to 0 5 grams of trans fat per serving  · Include healthy fats  Replace foods that are high in saturated and trans fat with foods high in healthy fats  This may help to decrease high cholesterol levels  ¨ Monounsaturated fats: These are found in avocados, nuts, and vegetable oils, such as olive, canola, and sunflower oil  ¨ Polyunsaturated fats: These can be found in vegetable oils, such as soybean or corn oil  Omega-3 fats can help to decrease the risk of heart disease  Omega-3 fats are found in fish, such as salmon, herring, trout, and tuna  Omega-3 fats can also be found in plant foods, such as walnuts, flaxseed, soybeans, and canola oil    Foods to limit or avoid:   · Grains:      ¨ Snacks that are made with partially hydrogenated oils, such as chips, regular crackers, and butter-flavored popcorn    ¨ High-fat baked goods, such as biscuits, croissants, doughnuts, pies, cookies, and pastries    · Dairy:      ¨ Whole milk, 2% milk, and yogurt and ice cream made with whole milk    ¨ Half and half creamer, heavy cream, and whipping cream    ¨ Cheese, cream cheese, and sour cream    · Meats and proteins:      ¨ High-fat cuts of meat (T-bone steak, regular hamburger, and ribs)    ¨ Fried meat, poultry (turkey and chicken), and fish    ¨ Poultry (chicken and turkey) with skin    ¨ Cold cuts (salami or bologna), hot dogs, merchant, and sausage    ¨ Whole eggs and egg yolks    · Vegetables and fruits with added fat:      ¨ Fried vegetables or vegetables in butter or high-fat sauces, such as cream or cheese sauces    ¨ Fried fruit or fruit served with butter or cream    · Fats:      ¨ Butter, stick margarine, and shortening    ¨ Coconut, palm oil, and palm kernel oil  Foods to include:   · Grains:      ¨ Whole-grain breads, cereals, pasta, and brown rice    ¨ Low-fat crackers and pretzels    · Vegetables and fruits:      ¨ Fresh, frozen, or canned vegetables (no salt or low-sodium)    ¨ Fresh, frozen, dried, or canned fruit (canned in light syrup or fruit juice)    ¨ Avocado    · Low-fat dairy products:      ¨ Nonfat (skim) or 1% milk    ¨ Nonfat or low-fat cheese, yogurt, and cottage cheese    · Meats and proteins:      ¨ Chicken or turkey with no skin    ¨ Baked or broiled fish    ¨ Lean beef and pork (loin, round, extra lean hamburger)    ¨ Beans and peas, unsalted nuts, soy products    ¨ Egg whites and substitutes    ¨ Seeds and nuts    · Fats:      ¨ Unsaturated oil, such as canola, olive, peanut, soybean, or sunflower oil    ¨ Soft or liquid margarine and vegetable oil spread    ¨ Low-fat salad dressing  Other ways to decrease fat:   · Read food labels before you buy foods  Choose foods that have less than 30% of calories from fat  Choose low-fat or fat-free dairy products  Remember that fat free does not mean calorie free  These foods still contain calories, and too many calories can lead to weight gain  · Trim fat from meat and avoid fried food  Trim all visible fat from meat before you cook it  Remove the skin from poultry  Do not manjarrez meat, fish, or poultry  Bake, roast, boil, or broil these foods instead  Avoid fried foods   Eat a baked potato instead of [place] fries  Steam vegetables instead of sautéing them in butter  · Add less fat to foods  Use imitation merchant bits on salads and baked potatoes instead of regular merchant bits  Use fat-free or low-fat salad dressings instead of regular dressings  Use low-fat or nonfat butter-flavored topping instead of regular butter or margarine on popcorn and other foods  Ways to decrease fat in recipes:  Replace high-fat ingredients with low-fat or nonfat ones  This may cause baked goods to be drier than usual  You may need to use nonfat cooking spray on pans to prevent food from sticking  You also may need to change the amount of other ingredients, such as water, in the recipe  Try the following:  · Use low-fat or light margarine instead of regular margarine or shortening  · Use lean ground turkey breast or chicken, or lean ground beef (less than 5% fat) instead of hamburger  · Add 1 teaspoon of canola oil to 8 ounces of skim milk instead of using cream or half and half  · Use grated zucchini, carrots, or apples in breads instead of coconut  · Use blenderized, low-fat cottage cheese, plain tofu, or low-fat ricotta cheese instead of cream cheese  · Use 1 egg white and 1 teaspoon of canola oil, or use ¼ cup (2 ounces) of fat-free egg substitute instead of a whole egg  · Replace half of the oil that is called for in a recipe with applesauce when you bake  Use 3 tablespoons of cocoa powder and 1 tablespoon of canola oil instead of a square of baking chocolate  How to increase fiber:  Eat enough high-fiber foods to get 20 to 30 grams of fiber every day  Slowly increase your fiber intake to avoid stomach cramps, gas, and other problems  · Eat 3 ounces of whole-grain foods each day  An ounce is about 1 slice of bread  Eat whole-grain breads, such as whole-wheat bread  Whole wheat, whole-wheat flour, or other whole grains should be listed as the first ingredient on the food label   Replace white flour with whole-grain flour or use half of each in recipes  Whole-grain flour is heavier than white flour, so you may have to add more yeast or baking powder  · Eat a high-fiber cereal for breakfast   Oatmeal is a good source of soluble fiber  Look for cereals that have bran or fiber in the name  Choose whole-grain products, such as brown rice, barley, and whole-wheat pasta  · Eat more beans, peas, and lentils  For example, add beans to soups or salads  Eat at least 5 cups of fruits and vegetables each day  Eat fruits and vegetables with the peel because the peel is high in fiber  © 2017 [facility] Information is for End User's use only and may not be sold, redistributed or otherwise used for commercial purposes  All illustrations and images included in CareNotes® are the copyrighted property of [employer] M , Inc  or Luis Rodríguez  The above information is an  only  It is not intended as medical advice for individual conditions or treatments  Talk to your doctor, nurse or pharmacist before following any medical regimen to see if it is safe and effective for you  Heart Healthy Diet   AMBULATORY CARE:   A heart healthy diet  is an eating plan low in total fat, unhealthy fats, and sodium (salt)  A heart healthy diet helps decrease your risk for heart disease and stroke  Limit the amount of fat you eat to 25% to 35% of your total daily calories  Limit sodium to less than 2,300 mg each day  Healthy fats:  Healthy fats can help improve cholesterol levels  The risk for heart disease is decreased when cholesterol levels are normal  Choose healthy fats, such as the following:  · Unsaturated fat  is found in foods such as soybean, canola, olive, corn, and safflower oils  It is also found in soft tub margarine that is made with liquid vegetable oil  · Omega-3 fat  is found in certain fish, such as salmon, tuna, and trout, and in walnuts and flaxseed    Unhealthy fats:  Unhealthy fats can cause unhealthy cholesterol levels in your blood and increase your risk of heart disease  Limit unhealthy fats, such as the following:  · Cholesterol  is found in animal foods, such as eggs and lobster, and in dairy products made from whole milk  Limit cholesterol to less than 300 milligrams (mg) each day  You may need to limit cholesterol to 200 mg each day if you have heart disease  · Saturated fat  is found in meats, such as merchant and hamburger  It is also found in chicken or turkey skin, whole milk, and butter  Limit saturated fat to less than 7% of your total daily calories  Limit saturated fat to less than 6% if you have heart disease or are at increased risk for it  · Trans fat  is found in packaged foods, such as potato chips and cookies  It is also in hard margarine, some fried foods, and shortening  Avoid trans fats as much as possible    Heart healthy foods and drinks to include:  Ask your dietitian or healthcare provider how many servings to have from each of the following food groups:  · Grains:      ¨ Whole-wheat breads, cereals, and pastas, and brown rice    ¨ Low-fat, low-sodium crackers and chips    · Vegetables:      ¨ Broccoli, green beans, green peas, and spinach    ¨ Collards, kale, and lima beans    ¨ Carrots, sweet potatoes, tomatoes, and peppers    ¨ Canned vegetables with no salt added    · Fruits:      ¨ Bananas, peaches, pears, and pineapple    ¨ Grapes, raisins, and dates    ¨ Oranges, tangerines, grapefruit, orange juice, and grapefruit juice    ¨ Apricots, mangoes, melons, and papaya    ¨ Raspberries and strawberries    ¨ Canned fruit with no added sugar    · Low-fat dairy products:      ¨ Nonfat (skim) milk, 1% milk, and low-fat almond, cashew, or soy milks fortified with calcium    ¨ Low-fat cheese, regular or frozen yogurt, and cottage cheese    · Meats and proteins , such as lean cuts of beef and pork (loin, leg, round), skinless chicken and turkey, legumes, soy products, egg whites, and nuts  Foods and drinks to limit or avoid:  Ask your dietitian or healthcare provider about these and other foods that are high in unhealthy fat, sodium, and sugar:  · Snack or packaged foods , such as frozen dinners, cookies, macaroni and cheese, and cereals with more than 300 mg of sodium per serving    · Canned or dry mixes  for cakes, soups, sauces, or gravies    · Vegetables with added sodium , such as instant potatoes, vegetables with added sauces, or regular canned vegetables    · Other foods high in sodium , such as ketchup, barbecue sauce, salad dressing, pickles, olives, soy sauce, and miso    · High-fat dairy foods  such as whole or 2% milk, cream cheese, or sour cream, and cheeses     · High-fat protein foods  such as high-fat cuts of beef (T-bone steaks, ribs), chicken or turkey with skin, and organ meats, such as liver    · Cured or smoked meats , such as hot dogs, merchant, and sausage    · Unhealthy fats and oils , such as butter, stick margarine, shortening, and cooking oils such as coconut or palm oil    · Food and drinks high in sugar , such as soft drinks (soda), sports drinks, sweetened tea, candy, cake, cookies, pies, and doughnuts  Other diet guidelines to follow:   · Eat more foods containing omega-3 fats  Eat fish high in omega-3 fats at least 2 times a week  · Limit alcohol  Too much alcohol can damage your heart and raise your blood pressure  Women should limit alcohol to 1 drink a day  Men should limit alcohol to 2 drinks a day  A drink of alcohol is 12 ounces of beer, 5 ounces of wine, or 1½ ounces of liquor  · Choose low-sodium foods  High-sodium foods can lead to high blood pressure  Add little or no salt to food you prepare  Use herbs and spices in place of salt  · Eat more fiber  to help lower cholesterol levels  Eat at least 5 servings of fruits and vegetables each day  Eat 3 ounces of whole-grain foods each day   Legumes (beans) are also a good source of fiber  Lifestyle guidelines:   · Do not smoke  Nicotine and other chemicals in cigarettes and cigars can cause lung and heart damage  Ask your healthcare provider for information if you currently smoke and need help to quit  E-cigarettes or smokeless tobacco still contain nicotine  Talk to your healthcare provider before you use these products  · Exercise regularly  to help you maintain a healthy weight and improve your blood pressure and cholesterol levels  Ask your healthcare provider about the best exercise plan for you  Do not start an exercise program without asking your healthcare provider  Follow up with your healthcare provider as directed:  Write down your questions so you remember to ask them during your visits  © 2017 [address]  Information is for End User's use only and may not be sold, redistributed or otherwise used for commercial purposes  All illustrations and images included in CareNotes® are the copyrighted property of [employer]  or [employer] Rodríguez  The above information is an  only  It is not intended as medical advice for individual conditions or treatments  Talk to your doctor, nurse or pharmacist before following any medical regimen to see if it is safe and effective for you  Calorie Counting Diet   WHAT YOU NEED TO KNOW:   What is a calorie counting diet? It is a meal plan based on counting calories each day to reach a healthy body weight  You will need to eat fewer calories if you are trying to lose weight  Weight loss may decrease your risk for certain health problems or improve your health if you have health problems  Some of these health problems include heart disease, high blood pressure, and diabetes  What foods should I avoid? Your dietitian will tell you if you need to avoid certain foods based on your body weight and health condition   You may need to avoid high-fat foods if you are at risk for or have heart disease  You may need to eat fewer foods from the breads and starches food group if you have diabetes  How many calories are in foods? The following is a list of foods and drinks with the approximate number of calories in each  Check the food label to find the exact number of calories  A dietitian can tell you how many calories you should have from each food group each day    · Carbohydrate:      ¨ ½ of a 3-inch bagel, 1 slice of bread, or ½ of a hamburger bun or hot dog bun (80)    ¨ 1 (8-inch) flour tortilla or ½ cup of cooked rice (100)    ¨ 1 (6-inch) corn tortilla (80)    ¨ 1 (6-inch) pancake or 1 cup of bran flakes cereal (110)    ¨ ½ cup of cooked cereal (80)    ¨ ½ cup of cooked pasta (85)    ¨ 1 ounce of pretzels (100)    ¨ 3 cups of air-popped popcorn without butter or oil (80)    · Dairy:      ¨ 1 cup of skim or 1% milk (90)    ¨ 1 cup of 2% milk (120)    ¨ 1 cup of whole milk (160)    ¨ 1 cup of 2% chocolate milk (220)    ¨ 1 ounce of low-fat cheese with 3 grams of fat per ounce (70)    ¨ 1 ounce of cheddar cheese (114)    ¨ ½ cup of 1% fat cottage cheese (80)    ¨ 1 cup of plain or sugar-free, fat-free yogurt (90)    · Protein foods:      ¨ 3 ounces of fish (not breaded or fried) (95)    ¨ 3 ounces of breaded, fried fish (195)    ¨ ¾ cup of tuna canned in water (105)    ¨ 3 ounces of chicken breast without skin (105)    ¨ 1 fried chicken breast with skin (350)    ¨ ¼ cup of fat free egg substitute (40)    ¨ 1 large egg (75)    ¨ 3 ounces of lean beef or pork (165)    ¨ 3 ounces of fried pork chop or ham (185)    ¨ ½ cup of cooked dried beans, such as kidney, emanuel, lentils, or navy (115)    ¨ 3 ounces of bologna or lunch meat (225)    ¨ 2 links of breakfast sausage (140)    · Vegetables:      ¨ ½ cup of sliced mushrooms (10)    ¨ 1 cup of salad greens, such as lettuce, spinach, or collins (15)    ¨ ½ cup of steamed asparagus (20)    ¨ ½ cup of cooked summer squash, zucchini squash, or green or wax beans (25)    ¨ 1 cup of broccoli or cauliflower florets, or 1 medium tomato (25)    ¨ 1 large raw carrot or ½ cup of cooked carrots (40)    ¨ ? of a medium cucumber or 1 stalk of celery (5)    ¨ 1 small baked potato (160)    ¨ 1 cup of breaded, fried vegetables (230)    · Fruit:      ¨ 1 (6-inch) banana (55)     ¨ ½ of a 4-inch grapefruit (55)    ¨ 15 grapes (60)    ¨ 1 medium orange or apple (70)    ¨ 1 large peach (65)    ¨ 1 cup of fresh pineapple chunks (75)    ¨ 1 cup of melon cubes (50)    ¨ 1¼ cups of whole strawberries (45)    ¨ ½ cup of fruit canned in juice (55)    ¨ ½ cup of fruit canned in heavy syrup (110)    ¨ ?  cup of raisins (130)    ¨ ½ cup of unsweetened fruit juice (60)    ¨ ½ cup of grape, cranberry, or prune juice (90)    · Fat:      ¨ 10 peanuts or 2 teaspoons of peanut butter (55)    ¨ 2 tablespoons of avocado or 1 tablespoon of regular salad dressing (45)    ¨ 2 slices of merchant (90)    ¨ 1 teaspoon of oil, such as safflower, canola, corn, or olive oil (45)    ¨ 2 teaspoons of low-fat margarine, or 1 tablespoon of low-fat mayonnaise (50)    ¨ 1 teaspoon of regular margarine (40)    ¨ 1 tablespoon of regular mayonnaise (135)    ¨ 1 tablespoon of cream cheese or 2 tablespoons of low-fat cream cheese (45)    ¨ 2 tablespoons of vegetable shortening (215)    · Dessert and sweets:      ¨ 8 animal crackers or 5 vanilla wafers (80)    ¨ 1 frozen fruit juice bar (80)    ¨ ½ cup of ice milk or low-fat frozen yogurt (90)    ¨ ½ cup of sherbet or sorbet (125)    ¨ ½ cup of sugar-free pudding or custard (60)    ¨ ½ cup of ice cream (140)    ¨ ½ cup of pudding or custard (175)    ¨ 1 (2-inch) square chocolate brownie (185)    · Combination foods:      ¨ Bean burrito made with an 8-inch tortilla, without cheese (275)    ¨ Chicken breast sandwich with lettuce and tomato (325)    ¨ 1 cup of chicken noodle soup (60)    ¨ 1 beef taco (175)    ¨ Regular hamburger with lettuce and tomato (310)    ¨ Regular cheeseburger with lettuce and tomato (410)     ¨ ¼ of a 12-inch cheese pizza (280)    ¨ Fried fish sandwich with lettuce and tomato (425)    ¨ Hot dog and bun (275)    ¨ 1½ cups of macaroni and cheese (310)    ¨ Taco salad with a fried tortilla shell (870)    · Low-calorie foods:      ¨ 1 tablespoon of ketchup or 1 tablespoon of fat free sour cream (15)    ¨ 1 teaspoon of mustard (5)    ¨ ¼ cup of salsa (20)    ¨ 1 large dill pickle (15)    ¨ 1 tablespoon of fat free salad dressing (10)    ¨ 2 teaspoons of low-sugar, light jam or jelly, or 1 tablespoon of sugar-free syrup (15)    ¨ 1 sugar-free popsicle (15)    ¨ 1 cup of club soda, seltzer water, or diet soda (0)  CARE AGREEMENT:   You have the right to help plan your care  Discuss treatment options with your caregivers to decide what care you want to receive  You always have the right to refuse treatment  The above information is an  only  It is not intended as medical advice for individual conditions or treatments  Talk to your doctor, nurse or pharmacist before following any medical regimen to see if it is safe and effective for you  © 2017 [address] [employer] Information is for End User's use only and may not be sold, redistributed or otherwise used for commercial purposes  All illustrations and images included in CareNotes® are the copyrighted property of [employer]  or [employer]  Low Fat Diet   AMBULATORY CARE:   A low-fat diet  is an eating plan that is low in total fat, unhealthy fat, and cholesterol  You may need to follow a low-fat diet if you have trouble digesting or absorbing fat  You may also need to follow this diet if you have high cholesterol  You can also lower your cholesterol by increasing the amount of fiber in your diet  Soluble fiber is a type of fiber that helps to decrease cholesterol levels  Different types of fat in food:   · Limit unhealthy fats    A diet that is high in cholesterol, saturated fat, and trans fat may cause unhealthy cholesterol levels  Unhealthy cholesterol levels increase your risk of heart disease  ¨ Cholesterol:  Limit intake of cholesterol to less than 200 mg per day  Cholesterol is found in meat, eggs, and dairy  ¨ Saturated fat:  Limit saturated fat to less than 7% of your total daily calories  Ask your dietitian how many calories you need each day  Saturated fat is found in butter, cheese, ice cream, whole milk, and palm oil  Saturated fat is also found in meat, such as beef, pork, chicken skin, and processed meats  Processed meats include sausage, hot dogs, and bologna  ¨ Trans fat:  Avoid trans fat as much as possible  Trans fat is used in fried and baked foods  Foods that say trans fat free on the label may still have up to 0 5 grams of trans fat per serving  · Include healthy fats  Replace foods that are high in saturated and trans fat with foods high in healthy fats  This may help to decrease high cholesterol levels  ¨ Monounsaturated fats: These are found in avocados, nuts, and vegetable oils, such as olive, canola, and sunflower oil  ¨ Polyunsaturated fats: These can be found in vegetable oils, such as soybean or corn oil  Omega-3 fats can help to decrease the risk of heart disease  Omega-3 fats are found in fish, such as salmon, herring, trout, and tuna  Omega-3 fats can also be found in plant foods, such as walnuts, flaxseed, soybeans, and canola oil    Foods to limit or avoid:   · Grains:      ¨ Snacks that are made with partially hydrogenated oils, such as chips, regular crackers, and butter-flavored popcorn    ¨ High-fat baked goods, such as biscuits, croissants, doughnuts, pies, cookies, and pastries    · Dairy:      ¨ Whole milk, 2% milk, and yogurt and ice cream made with whole milk    ¨ Half and half creamer, heavy cream, and whipping cream    ¨ Cheese, cream cheese, and sour cream    · Meats and proteins:      ¨ High-fat cuts of meat (T-bone steak, regular hamburger, and ribs)    ¨ Fried meat, poultry (turkey and chicken), and fish    ¨ Poultry (chicken and turkey) with skin    ¨ Cold cuts (salami or bologna), hot dogs, merchant, and sausage    ¨ Whole eggs and egg yolks    · Vegetables and fruits with added fat:      ¨ Fried vegetables or vegetables in butter or high-fat sauces, such as cream or cheese sauces    ¨ Fried fruit or fruit served with butter or cream    · Fats:      ¨ Butter, stick margarine, and shortening    ¨ Coconut, palm oil, and palm kernel oil  Foods to include:   · Grains:      ¨ Whole-grain breads, cereals, pasta, and brown rice    ¨ Low-fat crackers and pretzels    · Vegetables and fruits:      ¨ Fresh, frozen, or canned vegetables (no salt or low-sodium)    ¨ Fresh, frozen, dried, or canned fruit (canned in light syrup or fruit juice)    ¨ Avocado    · Low-fat dairy products:      ¨ Nonfat (skim) or 1% milk    ¨ Nonfat or low-fat cheese, yogurt, and cottage cheese    · Meats and proteins:      ¨ Chicken or turkey with no skin    ¨ Baked or broiled fish    ¨ Lean beef and pork (loin, round, extra lean hamburger)    ¨ Beans and peas, unsalted nuts, soy products    ¨ Egg whites and substitutes    ¨ Seeds and nuts    · Fats:      ¨ Unsaturated oil, such as canola, olive, peanut, soybean, or sunflower oil    ¨ Soft or liquid margarine and vegetable oil spread    ¨ Low-fat salad dressing  Other ways to decrease fat:   · Read food labels before you buy foods  Choose foods that have less than 30% of calories from fat  Choose low-fat or fat-free dairy products  Remember that fat free does not mean calorie free  These foods still contain calories, and too many calories can lead to weight gain  · Trim fat from meat and avoid fried food  Trim all visible fat from meat before you cook it  Remove the skin from poultry  Do not manjarrez meat, fish, or poultry  Bake, roast, boil, or broil these foods instead  Avoid fried foods   Eat a baked potato instead of [place] fries  Steam vegetables instead of sautéing them in butter  · Add less fat to foods  Use imitation merchant bits on salads and baked potatoes instead of regular merchant bits  Use fat-free or low-fat salad dressings instead of regular dressings  Use low-fat or nonfat butter-flavored topping instead of regular butter or margarine on popcorn and other foods  Ways to decrease fat in recipes:  Replace high-fat ingredients with low-fat or nonfat ones  This may cause baked goods to be drier than usual  You may need to use nonfat cooking spray on pans to prevent food from sticking  You also may need to change the amount of other ingredients, such as water, in the recipe  Try the following:  · Use low-fat or light margarine instead of regular margarine or shortening  · Use lean ground turkey breast or chicken, or lean ground beef (less than 5% fat) instead of hamburger  · Add 1 teaspoon of canola oil to 8 ounces of skim milk instead of using cream or half and half  · Use grated zucchini, carrots, or apples in breads instead of coconut  · Use blenderized, low-fat cottage cheese, plain tofu, or low-fat ricotta cheese instead of cream cheese  · Use 1 egg white and 1 teaspoon of canola oil, or use ¼ cup (2 ounces) of fat-free egg substitute instead of a whole egg  · Replace half of the oil that is called for in a recipe with applesauce when you bake  Use 3 tablespoons of cocoa powder and 1 tablespoon of canola oil instead of a square of baking chocolate  How to increase fiber:  Eat enough high-fiber foods to get 20 to 30 grams of fiber every day  Slowly increase your fiber intake to avoid stomach cramps, gas, and other problems  · Eat 3 ounces of whole-grain foods each day  An ounce is about 1 slice of bread  Eat whole-grain breads, such as whole-wheat bread  Whole wheat, whole-wheat flour, or other whole grains should be listed as the first ingredient on the food label   Replace white flour with whole-grain flour or use half of each in recipes  Whole-grain flour is heavier than white flour, so you may have to add more yeast or baking powder  · Eat a high-fiber cereal for breakfast   Oatmeal is a good source of soluble fiber  Look for cereals that have bran or fiber in the name  Choose whole-grain products, such as brown rice, barley, and whole-wheat pasta  · Eat more beans, peas, and lentils  For example, add beans to soups or salads  Eat at least 5 cups of fruits and vegetables each day  Eat fruits and vegetables with the peel because the peel is high in fiber  © 2017 [address] [employer] Information is for End User's use only and may not be sold, redistributed or otherwise used for commercial purposes  All illustrations and images included in CareNotes® are the copyrighted property of [employer]  or Luis Rodríguez  The above information is an  only  It is not intended as medical advice for individual conditions or treatments  Talk to your doctor, nurse or pharmacist before following any medical regimen to see if it is safe and effective for you  Heart Healthy Diet   AMBULATORY CARE:   A heart healthy diet  is an eating plan low in total fat, unhealthy fats, and sodium (salt)  A heart healthy diet helps decrease your risk for heart disease and stroke  Limit the amount of fat you eat to 25% to 35% of your total daily calories  Limit sodium to less than 2,300 mg each day  Healthy fats:  Healthy fats can help improve cholesterol levels  The risk for heart disease is decreased when cholesterol levels are normal  Choose healthy fats, such as the following:  · Unsaturated fat  is found in foods such as soybean, canola, olive, corn, and safflower oils  It is also found in soft tub margarine that is made with liquid vegetable oil  · Omega-3 fat  is found in certain fish, such as salmon, tuna, and trout, and in walnuts and flaxseed    Unhealthy fats: Unhealthy fats can cause unhealthy cholesterol levels in your blood and increase your risk of heart disease  Limit unhealthy fats, such as the following:  · Cholesterol  is found in animal foods, such as eggs and lobster, and in dairy products made from whole milk  Limit cholesterol to less than 300 milligrams (mg) each day  You may need to limit cholesterol to 200 mg each day if you have heart disease  · Saturated fat  is found in meats, such as merchant and hamburger  It is also found in chicken or turkey skin, whole milk, and butter  Limit saturated fat to less than 7% of your total daily calories  Limit saturated fat to less than 6% if you have heart disease or are at increased risk for it  · Trans fat  is found in packaged foods, such as potato chips and cookies  It is also in hard margarine, some fried foods, and shortening  Avoid trans fats as much as possible    Heart healthy foods and drinks to include:  Ask your dietitian or healthcare provider how many servings to have from each of the following food groups:  · Grains:      ¨ Whole-wheat breads, cereals, and pastas, and brown rice    ¨ Low-fat, low-sodium crackers and chips    · Vegetables:      ¨ Broccoli, green beans, green peas, and spinach    ¨ Collards, kale, and lima beans    ¨ Carrots, sweet potatoes, tomatoes, and peppers    ¨ Canned vegetables with no salt added    · Fruits:      ¨ Bananas, peaches, pears, and pineapple    ¨ Grapes, raisins, and dates    ¨ Oranges, tangerines, grapefruit, orange juice, and grapefruit juice    ¨ Apricots, mangoes, melons, and papaya    ¨ Raspberries and strawberries    ¨ Canned fruit with no added sugar    · Low-fat dairy products:      ¨ Nonfat (skim) milk, 1% milk, and low-fat almond, cashew, or soy milks fortified with calcium    ¨ Low-fat cheese, regular or frozen yogurt, and cottage cheese    · Meats and proteins , such as lean cuts of beef and pork (loin, leg, round), skinless chicken and turkey, legumes, soy products, egg whites, and nuts  Foods and drinks to limit or avoid:  Ask your dietitian or healthcare provider about these and other foods that are high in unhealthy fat, sodium, and sugar:  · Snack or packaged foods , such as frozen dinners, cookies, macaroni and cheese, and cereals with more than 300 mg of sodium per serving    · Canned or dry mixes  for cakes, soups, sauces, or gravies    · Vegetables with added sodium , such as instant potatoes, vegetables with added sauces, or regular canned vegetables    · Other foods high in sodium , such as ketchup, barbecue sauce, salad dressing, pickles, olives, soy sauce, and miso    · High-fat dairy foods  such as whole or 2% milk, cream cheese, or sour cream, and cheeses     · High-fat protein foods  such as high-fat cuts of beef (T-bone steaks, ribs), chicken or turkey with skin, and organ meats, such as liver    · Cured or smoked meats , such as hot dogs, merchant, and sausage    · Unhealthy fats and oils , such as butter, stick margarine, shortening, and cooking oils such as coconut or palm oil    · Food and drinks high in sugar , such as soft drinks (soda), sports drinks, sweetened tea, candy, cake, cookies, pies, and doughnuts  Other diet guidelines to follow:   · Eat more foods containing omega-3 fats  Eat fish high in omega-3 fats at least 2 times a week  · Limit alcohol  Too much alcohol can damage your heart and raise your blood pressure  Women should limit alcohol to 1 drink a day  Men should limit alcohol to 2 drinks a day  A drink of alcohol is 12 ounces of beer, 5 ounces of wine, or 1½ ounces of liquor  · Choose low-sodium foods  High-sodium foods can lead to high blood pressure  Add little or no salt to food you prepare  Use herbs and spices in place of salt  · Eat more fiber  to help lower cholesterol levels  Eat at least 5 servings of fruits and vegetables each day  Eat 3 ounces of whole-grain foods each day   Legumes (beans) are also a good source of fiber  Lifestyle guidelines:   · Do not smoke  Nicotine and other chemicals in cigarettes and cigars can cause lung and heart damage  Ask your healthcare provider for information if you currently smoke and need help to quit  E-cigarettes or smokeless tobacco still contain nicotine  Talk to your healthcare provider before you use these products  · Exercise regularly  to help you maintain a healthy weight and improve your blood pressure and cholesterol levels  Ask your healthcare provider about the best exercise plan for you  Do not start an exercise program without asking your healthcare provider  Follow up with your healthcare provider as directed:  Write down your questions so you remember to ask them during your visits  © 2017 [address] [employer]  Information is for End User's use only and may not be sold, redistributed or otherwise used for commercial purposes  All illustrations and images included in CareNotes® are the copyrighted property of [employer]  or [employer]  The above information is an  only  It is not intended as medical advice for individual conditions or treatments  Talk to your doctor, nurse or pharmacist before following any medical regimen to see if it is safe and effective for you  Calorie Counting Diet   WHAT YOU NEED TO KNOW:   What is a calorie counting diet? It is a meal plan based on counting calories each day to reach a healthy body weight  You will need to eat fewer calories if you are trying to lose weight  Weight loss may decrease your risk for certain health problems or improve your health if you have health problems  Some of these health problems include heart disease, high blood pressure, and diabetes  What foods should I avoid? Your dietitian will tell you if you need to avoid certain foods based on your body weight and health condition   You may need to avoid high-fat foods if you are at risk for or have heart disease  You may need to eat fewer foods from the breads and starches food group if you have diabetes  How many calories are in foods? The following is a list of foods and drinks with the approximate number of calories in each  Check the food label to find the exact number of calories  A dietitian can tell you how many calories you should have from each food group each day    · Carbohydrate:      ¨ ½ of a 3-inch bagel, 1 slice of bread, or ½ of a hamburger bun or hot dog bun (80)    ¨ 1 (8-inch) flour tortilla or ½ cup of cooked rice (100)    ¨ 1 (6-inch) corn tortilla (80)    ¨ 1 (6-inch) pancake or 1 cup of bran flakes cereal (110)    ¨ ½ cup of cooked cereal (80)    ¨ ½ cup of cooked pasta (85)    ¨ 1 ounce of pretzels (100)    ¨ 3 cups of air-popped popcorn without butter or oil (80)    · Dairy:      ¨ 1 cup of skim or 1% milk (90)    ¨ 1 cup of 2% milk (120)    ¨ 1 cup of whole milk (160)    ¨ 1 cup of 2% chocolate milk (220)    ¨ 1 ounce of low-fat cheese with 3 grams of fat per ounce (70)    ¨ 1 ounce of cheddar cheese (114)    ¨ ½ cup of 1% fat cottage cheese (80)    ¨ 1 cup of plain or sugar-free, fat-free yogurt (90)    · Protein foods:      ¨ 3 ounces of fish (not breaded or fried) (95)    ¨ 3 ounces of breaded, fried fish (195)    ¨ ¾ cup of tuna canned in water (105)    ¨ 3 ounces of chicken breast without skin (105)    ¨ 1 fried chicken breast with skin (350)    ¨ ¼ cup of fat free egg substitute (40)    ¨ 1 large egg (75)    ¨ 3 ounces of lean beef or pork (165)    ¨ 3 ounces of fried pork chop or ham (185)    ¨ ½ cup of cooked dried beans, such as kidney, emanuel, lentils, or navy (115)    ¨ 3 ounces of bologna or lunch meat (225)    ¨ 2 links of breakfast sausage (140)    · Vegetables:      ¨ ½ cup of sliced mushrooms (10)    ¨ 1 cup of salad greens, such as lettuce, spinach, or collins (15)    ¨ ½ cup of steamed asparagus (20)    ¨ ½ cup of cooked summer squash, zucchini squash, or green or wax beans (25)    ¨ 1 cup of broccoli or cauliflower florets, or 1 medium tomato (25)    ¨ 1 large raw carrot or ½ cup of cooked carrots (40)    ¨ ? of a medium cucumber or 1 stalk of celery (5)    ¨ 1 small baked potato (160)    ¨ 1 cup of breaded, fried vegetables (230)    · Fruit:      ¨ 1 (6-inch) banana (55)     ¨ ½ of a 4-inch grapefruit (55)    ¨ 15 grapes (60)    ¨ 1 medium orange or apple (70)    ¨ 1 large peach (65)    ¨ 1 cup of fresh pineapple chunks (75)    ¨ 1 cup of melon cubes (50)    ¨ 1¼ cups of whole strawberries (45)    ¨ ½ cup of fruit canned in juice (55)    ¨ ½ cup of fruit canned in heavy syrup (110)    ¨ ?  cup of raisins (130)    ¨ ½ cup of unsweetened fruit juice (60)    ¨ ½ cup of grape, cranberry, or prune juice (90)    · Fat:      ¨ 10 peanuts or 2 teaspoons of peanut butter (55)    ¨ 2 tablespoons of avocado or 1 tablespoon of regular salad dressing (45)    ¨ 2 slices of mrechant (90)    ¨ 1 teaspoon of oil, such as safflower, canola, corn, or olive oil (45)    ¨ 2 teaspoons of low-fat margarine, or 1 tablespoon of low-fat mayonnaise (50)    ¨ 1 teaspoon of regular margarine (40)    ¨ 1 tablespoon of regular mayonnaise (135)    ¨ 1 tablespoon of cream cheese or 2 tablespoons of low-fat cream cheese (45)    ¨ 2 tablespoons of vegetable shortening (215)    · Dessert and sweets:      ¨ 8 animal crackers or 5 vanilla wafers (80)    ¨ 1 frozen fruit juice bar (80)    ¨ ½ cup of ice milk or low-fat frozen yogurt (90)    ¨ ½ cup of sherbet or sorbet (125)    ¨ ½ cup of sugar-free pudding or custard (60)    ¨ ½ cup of ice cream (140)    ¨ ½ cup of pudding or custard (175)    ¨ 1 (2-inch) square chocolate brownie (185)    · Combination foods:      ¨ Bean burrito made with an 8-inch tortilla, without cheese (275)    ¨ Chicken breast sandwich with lettuce and tomato (325)    ¨ 1 cup of chicken noodle soup (60)    ¨ 1 beef taco (175)    ¨ Regular hamburger with lettuce and tomato (310)    ¨ Regular cheeseburger with lettuce and tomato (410)     ¨ ¼ of a 12-inch cheese pizza (280)    ¨ Fried fish sandwich with lettuce and tomato (425)    ¨ Hot dog and bun (275)    ¨ 1½ cups of macaroni and cheese (310)    ¨ Taco salad with a fried tortilla shell (870)    · Low-calorie foods:      ¨ 1 tablespoon of ketchup or 1 tablespoon of fat free sour cream (15)    ¨ 1 teaspoon of mustard (5)    ¨ ¼ cup of salsa (20)    ¨ 1 large dill pickle (15)    ¨ 1 tablespoon of fat free salad dressing (10)    ¨ 2 teaspoons of low-sugar, light jam or jelly, or 1 tablespoon of sugar-free syrup (15)    ¨ 1 sugar-free popsicle (15)    ¨ 1 cup of club soda, seltzer water, or diet soda (0)  CARE AGREEMENT:   You have the right to help plan your care  Discuss treatment options with your caregivers to decide what care you want to receive  You always have the right to refuse treatment  The above information is an  only  It is not intended as medical advice for individual conditions or treatments  Talk to your doctor, nurse or pharmacist before following any medical regimen to see if it is safe and effective for you  © 2017 [address] [employer] Information is for End User's use only and may not be sold, redistributed or otherwise used for commercial purposes  All illustrations and images included in CareNotes® are the copyrighted property of [employer]  or Luis Rodríguez

## 2019-09-12 NOTE — PROGRESS NOTES
Assessment and Plan:     Problem List Items Addressed This Visit        Cardiovascular and Mediastinum    Hypertension    Relevant Orders    Comprehensive metabolic panel    CBC and differential    Lipid panel    Urinalysis with reflex to microscopic    Thyroid Antibodies Panel    T4, free    TSH, 3rd generation    Magnesium    PSA Total, Diagnostic    Vitamin B12    Vitamin D 25 hydroxy    POCT ECG      Other Visit Diagnoses     Encounter for general adult medical examination with abnormal findings    -  Primary    Relevant Orders    PSA Total, Diagnostic    Vitamin B12    Vitamin D 25 hydroxy    Need for hepatitis C screening test        Relevant Orders    Hepatitis C antibody    PSA Total, Diagnostic    Screening for colon cancer        Relevant Orders    Ambulatory referral to Gastroenterology    Occult Blood, Fecal Immunochemical    PSA Total, Diagnostic    Neck pain        Relevant Medications    celecoxib (CeleBREX) 200 mg capsule    lidocaine (LIDODERM) 5 %    methocarbamol (ROBAXIN) 500 mg tablet    Other Relevant Orders    Thyroid Antibodies Panel    T4, free    TSH, 3rd generation    Vitamin B12    Vitamin D 25 hydroxy           Preventive health issues were discussed with patient, and age appropriate screening tests were ordered as noted in patient's After Visit Summary  Personalized health advice and appropriate referrals for health education or preventive services given if needed, as noted in patient's After Visit Summary  History of Present Illness:     Patient presents for Medicare Annual Wellness visit    Patient Care Team:  Clari Flaherty MD as PCP - General (Internal Medicine)     Problem List:     Patient Active Problem List   Diagnosis    Hypertension    Hyperlipidemia    Chronic lymphocytic leukemia ([facility] [address] )      Past Medical and Surgical History:     Past Medical History:   Diagnosis Date    Hypertension     Leukemia ([address] )      No past surgical history on file     Family History: Family History   Problem Relation Age of Onset    Diabetes Mother     Heart disease Father       Social History:     Social History     Socioeconomic History    Marital status: /Civil Union     Spouse name: None    Number of children: None    Years of education: None    Highest education level: None   Occupational History    None   Social Needs    Financial resource strain: None    Food insecurity:     Worry: None     Inability: None    Transportation needs:     Medical: None     Non-medical: None   Tobacco Use    Smoking status: Never Smoker    Smokeless tobacco: Never Used    Tobacco comment: No passive smoke exposure   Substance and Sexual Activity    Alcohol use: No    Drug use: No    Sexual activity: None   Lifestyle    Physical activity:     Days per week: None     Minutes per session: None    Stress: None   Relationships    Social connections:     Talks on phone: None     Gets together: None     Attends Sabianism service: None     Active member of club or organization: None     Attends meetings of clubs or organizations: None     Relationship status: None    Intimate partner violence:     Fear of current or ex partner: None     Emotionally abused: None     Physically abused: None     Forced sexual activity: None   Other Topics Concern    None   Social History Narrative    None       Medications and Allergies:     Current Outpatient Medications   Medication Sig Dispense Refill    IMBRUVICA 280 MG TABS       nebivolol (BYSTOLIC) 10 mg tablet Take 1 tablet (10 mg total) by mouth daily 90 tablet 1    tamsulosin (FLOMAX) 0 4 mg take 1 capsule by oral route once daily      celecoxib (CeleBREX) 200 mg capsule Take 1 capsule (200 mg total) by mouth daily With food/Breakfast 30 capsule 2    lidocaine (LIDODERM) 5 % Apply 1 patch topically daily Remove & Discard patch within 12 hours or as directed by MD 30 patch 1    methocarbamol (ROBAXIN) 500 mg tablet Take 1 tablet (500 mg total) by mouth 2 (two) times a day with meals With food/meals 60 tablet 2    ofloxacin (FLOXIN) 0 3 % otic solution Administer 5 drops into the left ear 2 (two) times a day (Patient not taking: Reported on 9/12/2019) 5 mL 0     No current facility-administered medications for this visit  Allergies   Allergen Reactions    Tetanus Antitoxin Rash     Patient told by mother medication was given as a child  Patient told by mother medication was given as a child    Tetanus Toxoid       Immunizations:     Immunization History   Administered Date(s) Administered    Fluzone Split Quad 0 5 mL 10/10/2017    INFLUENZA 11/01/2013, 10/20/2015, 10/03/2016    Influenza Split 10/16/2013    Influenza Split High Dose Preservative Free IM 10/03/2016    Influenza TIV (IM) 10/20/2015    Influenza, high dose seasonal 0 5 mL 10/05/2018    Pneumococcal Polysaccharide PPV23 10/01/2002, 10/16/2013      Health Maintenance:         Topic Date Due    Hepatitis C Screening  1945    CRC Screening: Colonoscopy  11/07/2018         Topic Date Due    INFLUENZA VACCINE  07/01/2019      Medicare Health Risk Assessment:     /88 (BP Location: Left arm, Patient Position: Sitting, Cuff Size: Standard)   Pulse 98   Temp 97 5 °F (36 4 °C) (Tympanic)   Resp 14   Ht 5' 6 7" (1 694 m)   Wt 72 1 kg (159 lb)   SpO2 98%   BMI 25 13 kg/m²      Jada Pierre is here for his Subsequent Wellness visit  Health Risk Assessment:   Patient rates overall health as good  Patient feels that their physical health rating is same  Eyesight was rated as same  Hearing was rated as slightly worse  Patient feels that their emotional and mental health rating is same  Pain experienced in the last 7 days has been a lot  Patient's pain rating has been 10/10  Patient states that he has experienced no weight loss or gain in last 6 months  Neck, jaw and ear pain, left side    Depression Screening:   PHQ-2 Score: 0      Fall Risk Screening:    In the past year, patient has experienced: history of falling in past year    Number of falls: 1  Injured during fall?: No    Feels unsteady when standing or walking?: No    Worried about falling?: No      Home Safety:  Patient has trouble with stairs inside or outside of their home  Patient has working smoke alarms and has working carbon monoxide detector  Home safety hazards include: none  Nutrition:   Current diet is Regular  Medications:   Patient is not currently taking any over-the-counter supplements  Patient is able to manage medications  Activities of Daily Living (ADLs)/Instrumental Activities of Daily Living (IADLs):   Walk and transfer into and out of bed and chair?: Yes  Dress and groom yourself?: Yes    Bathe or shower yourself?: Yes    Feed yourself? Yes  Do your laundry/housekeeping?: Yes  Manage your money, pay your bills and track your expenses?: Yes  Make your own meals?: Yes    Do your own shopping?: Yes    Previous Hospitalizations:   Any hospitalizations or ED visits within the last 12 months?: No      Advance Care Planning:   Living will: Yes    Durable POA for healthcare:  Yes    Advanced directive: Yes    Advanced directive counseling given: Yes    Five wishes given: Yes    Patient declined ACP directive: Yes    End of Life Decisions reviewed with patient: Yes    Provider agrees with end of life decisions: Yes      PREVENTIVE SCREENINGS      Cardiovascular Screening:    General: History Lipid Disorder and Risks and Benefits Discussed      Diabetes Screening:     General: Screening Current and Risks and Benefits Discussed      Colorectal Cancer Screening:     General: Screening Current      Prostate Cancer Screening:    General: Risks and Benefits Discussed      Osteoporosis Screening:    General: Risks and Benefits Discussed      Abdominal Aortic Aneurysm (AAA) Screening:    Risk factors include: age between 73-69 yo        General: Risks and Benefits Discussed      Lung Cancer Screening: General: Screening Not Indicated and Risks and Benefits Discussed      Hepatitis C Screening:    General: Risks and Benefits Discussed    Other Counseling Topics:   Car/seat belt/driving safety, skin self-exam, sunscreen and calcium and vitamin D intake  Arlene Phalen, MD  BMI Counseling: Body mass index is 25 13 kg/m²  The BMI is above normal  Nutrition recommendations include reducing portion sizes and decreasing overall calorie intake

## 2019-09-12 NOTE — PROGRESS NOTES
Assessment/Plan:         Diagnoses and all orders for this visit:    Encounter for general adult medical examination with abnormal findings; done in Detail, RTC in 3mos w :  -     PSA Total, Diagnostic; Future  -     Vitamin B12; Future  -     Vitamin D 25 hydroxy; Future    Need for hepatitis C screening test  -     Hepatitis C antibody; Future  -     PSA Total, Diagnostic; Future    Screening for colon cancer  -     Ambulatory referral to Gastroenterology; Future  -     Occult Blood, Fecal Immunochemical; Future  -     PSA Total, Diagnostic; Future    Neck pain ; Home P T  ,TRY :  -     celecoxib (CeleBREX) 200 mg capsule; Take 1 capsule (200 mg total) by mouth daily With food/Breakfast  -     lidocaine (LIDODERM) 5 %; Apply 1 patch topically daily Remove & Discard patch within 12 hours or as directed by MD  -     methocarbamol (ROBAXIN) 500 mg tablet; Take 1 tablet (500 mg total) by mouth 2 (two) times a day with meals With food/meals  RTC in 1mo w :  -     Thyroid Antibodies Panel; Future  -     T4, free; Future  -     TSH, 3rd generation; Future  -     Vitamin B12; Future  -     Vitamin D 25 hydroxy; Future    Essential hypertension; stable, Continue Bystolic 10 mg  RTC in 1mo w :  -     Comprehensive metabolic panel; Future  -     CBC and differential; Future  -     Lipid panel; Future  -     Urinalysis with reflex to microscopic  -     Thyroid Antibodies Panel; Future  -     T4, free; Future  -     TSH, 3rd generation; Future  -     Magnesium; Future  -     PSA Total, Diagnostic; Future  -     Vitamin B12; Future  -     Vitamin D 25 hydroxy; Future  -     POCT ECG    Palpitation; with abnormal ECG ? Cause : R/O A  Flutter : RTC in 1mo w :  -     Holter monitor - 48 hour; Future  -     Echo complete with contrast if indicated; Future        Subjective:      Patient ID: Radha Alberts is a 68 y o  male      68 Y O man is here for AWV and  Regular check up and Increasing pain and stiffness left neck/left ear area, for 3-4 weeks, No injury, No recent Blood work, med List reviewed  w pt     The following portions of the patient's history were reviewed and updated as appropriate: allergies, current medications, past family history, past medical history, past social history, past surgical history and problem list     Review of Systems   Constitutional: Negative for chills, fatigue and fever  HENT: Positive for ear pain  Negative for congestion, facial swelling, sore throat, trouble swallowing and voice change  Eyes: Negative for pain, discharge and visual disturbance  Respiratory: Negative for cough, shortness of breath and wheezing  Cardiovascular: Positive for palpitations  Negative for chest pain and leg swelling  Gastrointestinal: Negative for abdominal pain, blood in stool, constipation, diarrhea and nausea  Endocrine: Negative for polydipsia, polyphagia and polyuria  Genitourinary: Negative for difficulty urinating, hematuria and urgency  Musculoskeletal: Positive for neck pain and neck stiffness  Negative for arthralgias and myalgias  Skin: Negative for rash  Neurological: Negative for dizziness, tremors, weakness and headaches  Hematological: Negative for adenopathy  Does not bruise/bleed easily  Psychiatric/Behavioral: Negative for dysphoric mood, sleep disturbance and suicidal ideas  Objective:      /88 (BP Location: Left arm, Patient Position: Sitting, Cuff Size: Standard)   Pulse 98   Temp 97 5 °F (36 4 °C) (Tympanic)   Resp 14   Ht 5' 6 7" (1 694 m)   Wt 72 1 kg (159 lb)   SpO2 98%   BMI 25 13 kg/m²          Physical Exam   Constitutional: He is oriented to person, place, and time  He appears well-nourished  No distress  HENT:   Head: Normocephalic  Mouth/Throat: Oropharynx is clear and moist  No oropharyngeal exudate  Eyes: Pupils are equal, round, and reactive to light  Conjunctivae are normal  No scleral icterus  Neck: Neck supple   No thyromegaly present  Cardiovascular: Normal rate  Murmur heard  Irregular Heart Rate ?? Pulmonary/Chest: Effort normal and breath sounds normal  No respiratory distress  He has no wheezes  He has no rales  Abdominal: Soft  Bowel sounds are normal  He exhibits no distension  There is no tenderness  There is no rebound and no guarding  Musculoskeletal: He exhibits tenderness  He exhibits no edema  Tenderness and stiffness and Muscle spasm left neck area,  Lymphadenopathy:     He has no cervical adenopathy  Neurological: He is alert and oriented to person, place, and time  No cranial nerve deficit  Coordination normal    Skin: No erythema  Psychiatric: He has a normal mood and affect  BMI Counseling: Body mass index is 25 13 kg/m²  The BMI is above normal  Nutrition recommendations include reducing portion sizes and decreasing overall calorie intake

## 2019-11-10 DIAGNOSIS — I10 ESSENTIAL HYPERTENSION, MALIGNANT: ICD-10-CM

## 2019-11-10 RX ORDER — NEBIVOLOL HYDROCHLORIDE 10 MG/1
TABLET ORAL
Qty: 90 TABLET | Refills: 1 | Status: SHIPPED | OUTPATIENT
Start: 2019-11-10 | End: 2020-01-23 | Stop reason: SDUPTHER

## 2019-11-13 DIAGNOSIS — J06.9 ACUTE URI: Primary | ICD-10-CM

## 2019-11-13 RX ORDER — GUAIFENESIN/DEXTROMETHORPHAN 100-10MG/5
5 SYRUP ORAL 3 TIMES DAILY PRN
Qty: 118 ML | Refills: 1 | Status: SHIPPED | OUTPATIENT
Start: 2019-11-13 | End: 2019-11-27

## 2019-11-13 RX ORDER — AMOXICILLIN 500 MG/1
500 CAPSULE ORAL EVERY 8 HOURS SCHEDULED
Qty: 30 CAPSULE | Refills: 0 | Status: SHIPPED | OUTPATIENT
Start: 2019-11-13 | End: 2019-11-23

## 2019-11-27 ENCOUNTER — HOSPITAL ENCOUNTER (OUTPATIENT)
Dept: RADIOLOGY | Facility: HOSPITAL | Age: 74
Discharge: HOME | End: 2019-11-27
Payer: MEDICARE

## 2019-11-27 ENCOUNTER — OFFICE VISIT (OUTPATIENT)
Dept: FAMILY MEDICINE CLINIC | Facility: CLINIC | Age: 74
End: 2019-11-27
Payer: MEDICARE

## 2019-11-27 VITALS
HEART RATE: 90 BPM | BODY MASS INDEX: 25.76 KG/M2 | DIASTOLIC BLOOD PRESSURE: 82 MMHG | HEIGHT: 66 IN | OXYGEN SATURATION: 97 % | TEMPERATURE: 99.9 F | SYSTOLIC BLOOD PRESSURE: 130 MMHG | RESPIRATION RATE: 14 BRPM | WEIGHT: 160.3 LBS

## 2019-11-27 DIAGNOSIS — J20.9 ACUTE BRONCHITIS, UNSPECIFIED ORGANISM: Primary | ICD-10-CM

## 2019-11-27 DIAGNOSIS — M79.672 LEFT FOOT PAIN: Primary | ICD-10-CM

## 2019-11-27 DIAGNOSIS — M79.672 LEFT FOOT PAIN: ICD-10-CM

## 2019-11-27 DIAGNOSIS — M84.478A PATHOLOGICAL FRACTURE, LEFT TOE(S), INITIAL ENCOUNTER FOR FRACTURE: ICD-10-CM

## 2019-11-27 LAB — NON VENT ROOM AIR: 650 %

## 2019-11-27 PROCEDURE — 99214 OFFICE O/P EST MOD 30 MIN: CPT | Performed by: INTERNAL MEDICINE

## 2019-11-27 PROCEDURE — 73630 X-RAY EXAM OF FOOT: CPT

## 2019-11-27 RX ORDER — LEVOFLOXACIN 500 MG/1
500 TABLET, FILM COATED ORAL EVERY 24 HOURS
Qty: 10 TABLET | Refills: 0 | Status: SHIPPED | OUTPATIENT
Start: 2019-11-27 | End: 2019-12-07

## 2019-11-27 RX ORDER — GUAIFENESIN/DEXTROMETHORPHAN 100-10MG/5
5 SYRUP ORAL 3 TIMES DAILY PRN
Qty: 118 ML | Refills: 1 | Status: SHIPPED | OUTPATIENT
Start: 2019-11-27 | End: 2019-12-13 | Stop reason: SDUPTHER

## 2019-11-27 NOTE — PROGRESS NOTES
Assessment/Plan:         Diagnoses and all orders for this visit:    Acute bronchitis, unspecified organism : Bed Rest, Increase PO fluids, TRY :  -     POCT peak flow  -     levofloxacin (LEVAQUIN) 500 mg tablet; Take 1 tablet (500 mg total) by mouth every 24 hours for 10 days  -     dextromethorphan-guaiFENesin (ROBITUSSIN DM)  mg/5 mL syrup; Take 5 mL by mouth 3 (three) times a day as needed for cough or congestion  RTC in 10 Days  Pathological fracture, left toe(s), initial encounter for fracture  -     Ambulatory referral to Podiatry;  STAT    Future  -     Cast [employer] Sandle   On Daytime off night        Subjective:      Patient ID: Chey Nazario is a 69 y o  male  69 Y O man is here for increased Cold symptoms for 1-2 weeks , also he fell on his left foot yesterday, X rays showed Fracture left large toe    The following portions of the patient's history were reviewed and updated as appropriate: allergies, current medications, past family history, past social history, past surgical history and problem list     Review of Systems   Constitutional: Positive for fatigue and fever  Negative for chills  HENT: Positive for postnasal drip, sinus pressure and sore throat  Negative for congestion, facial swelling, trouble swallowing and voice change  Eyes: Negative for pain, discharge and visual disturbance  Respiratory: Positive for cough  Negative for shortness of breath and wheezing  Cardiovascular: Negative for chest pain, palpitations and leg swelling  Gastrointestinal: Negative for abdominal pain, blood in stool, constipation, diarrhea and nausea  Endocrine: Negative for polydipsia, polyphagia and polyuria  Genitourinary: Negative for difficulty urinating, hematuria and urgency  Musculoskeletal: Positive for arthralgias and joint swelling  Negative for myalgias  Skin: Negative for rash  Neurological: Negative for dizziness, tremors, weakness and headaches     Hematological: Negative for adenopathy  Does not bruise/bleed easily  Psychiatric/Behavioral: Negative for dysphoric mood, sleep disturbance and suicidal ideas  Objective:      /82 (BP Location: Left arm, Patient Position: Sitting, Cuff Size: Standard)   Pulse 90   Temp 99 9 °F (37 7 °C) (Probe)   Resp 14   Ht 5' 6" (1 676 m)   Wt 72 7 kg (160 lb 4 8 oz)   SpO2 97%   BMI 25 87 kg/m²          Physical Exam   Constitutional: He is oriented to person, place, and time  He appears well-nourished  No distress  HENT:   Head: Normocephalic  Mouth/Throat: No oropharyngeal exudate  Acute URI   Eyes: Pupils are equal, round, and reactive to light  Conjunctivae are normal  No scleral icterus  Neck: Neck supple  No thyromegaly present  Cardiovascular: Normal rate, regular rhythm and normal heart sounds  No murmur heard  Pulmonary/Chest: Effort normal  No respiratory distress  He has wheezes  He has no rales  Abdominal: Soft  Bowel sounds are normal  He exhibits no distension  There is no tenderness  There is no rebound and no guarding  Musculoskeletal: He exhibits tenderness  He exhibits no edema  Swelling Tenderness Redness left large toe   Lymphadenopathy:     He has no cervical adenopathy  Neurological: He is alert and oriented to person, place, and time  No cranial nerve deficit  Coordination normal    Skin: No erythema  No pallor  Psychiatric: He has a normal mood and affect

## 2019-12-13 ENCOUNTER — APPOINTMENT (OUTPATIENT)
Dept: RADIOLOGY | Age: 74
End: 2019-12-13
Payer: MEDICARE

## 2019-12-13 ENCOUNTER — APPOINTMENT (OUTPATIENT)
Dept: LAB | Age: 74
End: 2019-12-13
Payer: MEDICARE

## 2019-12-13 ENCOUNTER — OFFICE VISIT (OUTPATIENT)
Dept: FAMILY MEDICINE CLINIC | Facility: CLINIC | Age: 74
End: 2019-12-13
Payer: MEDICARE

## 2019-12-13 VITALS
DIASTOLIC BLOOD PRESSURE: 82 MMHG | HEART RATE: 80 BPM | OXYGEN SATURATION: 97 % | BODY MASS INDEX: 25.76 KG/M2 | SYSTOLIC BLOOD PRESSURE: 156 MMHG | TEMPERATURE: 97.9 F | HEIGHT: 66 IN | RESPIRATION RATE: 14 BRPM | WEIGHT: 160.3 LBS

## 2019-12-13 DIAGNOSIS — J20.9 ACUTE BRONCHITIS, UNSPECIFIED ORGANISM: ICD-10-CM

## 2019-12-13 DIAGNOSIS — L98.9 SKIN LESIONS: ICD-10-CM

## 2019-12-13 DIAGNOSIS — J18.9 PNEUMONIA OF RIGHT LOWER LOBE DUE TO INFECTIOUS ORGANISM: ICD-10-CM

## 2019-12-13 DIAGNOSIS — J18.9 PNEUMONIA OF RIGHT LOWER LOBE DUE TO INFECTIOUS ORGANISM: Primary | ICD-10-CM

## 2019-12-13 LAB
ALBUMIN SERPL BCP-MCNC: 3.9 G/DL (ref 3.5–5)
ALP SERPL-CCNC: 120 U/L (ref 46–116)
ALT SERPL W P-5'-P-CCNC: 39 U/L (ref 12–78)
ANION GAP SERPL CALCULATED.3IONS-SCNC: 3 MMOL/L (ref 4–13)
AST SERPL W P-5'-P-CCNC: 18 U/L (ref 5–45)
BASOPHILS # BLD AUTO: 0.13 THOUSANDS/ΜL (ref 0–0.1)
BASOPHILS NFR BLD AUTO: 1 % (ref 0–1)
BILIRUB SERPL-MCNC: 0.47 MG/DL (ref 0.2–1)
BUN SERPL-MCNC: 20 MG/DL (ref 5–25)
CALCIUM SERPL-MCNC: 9.4 MG/DL (ref 8.3–10.1)
CHLORIDE SERPL-SCNC: 103 MMOL/L (ref 100–108)
CO2 SERPL-SCNC: 33 MMOL/L (ref 21–32)
CREAT SERPL-MCNC: 1.06 MG/DL (ref 0.6–1.3)
EOSINOPHIL # BLD AUTO: 0.05 THOUSAND/ΜL (ref 0–0.61)
EOSINOPHIL NFR BLD AUTO: 0 % (ref 0–6)
ERYTHROCYTE [DISTWIDTH] IN BLOOD BY AUTOMATED COUNT: 13.9 % (ref 11.6–15.1)
GFR SERPL CREATININE-BSD FRML MDRD: 69 ML/MIN/1.73SQ M
GLUCOSE SERPL-MCNC: 95 MG/DL (ref 65–140)
HCT VFR BLD AUTO: 50.1 % (ref 36.5–49.3)
HGB BLD-MCNC: 16.1 G/DL (ref 12–17)
IMM GRANULOCYTES # BLD AUTO: 0.14 THOUSAND/UL (ref 0–0.2)
IMM GRANULOCYTES NFR BLD AUTO: 1 % (ref 0–2)
LYMPHOCYTES # BLD AUTO: 1.06 THOUSANDS/ΜL (ref 0.6–4.47)
LYMPHOCYTES NFR BLD AUTO: 9 % (ref 14–44)
MCH RBC QN AUTO: 29.4 PG (ref 26.8–34.3)
MCHC RBC AUTO-ENTMCNC: 32.1 G/DL (ref 31.4–37.4)
MCV RBC AUTO: 92 FL (ref 82–98)
MONOCYTES # BLD AUTO: 2.24 THOUSAND/ΜL (ref 0.17–1.22)
MONOCYTES NFR BLD AUTO: 18 % (ref 4–12)
NEUTROPHILS # BLD AUTO: 8.54 THOUSANDS/ΜL (ref 1.85–7.62)
NEUTS SEG NFR BLD AUTO: 71 % (ref 43–75)
NON VENT ROOM AIR: 450 %
NRBC BLD AUTO-RTO: 0 /100 WBCS
PLATELET # BLD AUTO: 396 THOUSANDS/UL (ref 149–390)
PMV BLD AUTO: 10.7 FL (ref 8.9–12.7)
POTASSIUM SERPL-SCNC: 4 MMOL/L (ref 3.5–5.3)
PROT SERPL-MCNC: 7 G/DL (ref 6.4–8.2)
RBC # BLD AUTO: 5.47 MILLION/UL (ref 3.88–5.62)
SODIUM SERPL-SCNC: 139 MMOL/L (ref 136–145)
WBC # BLD AUTO: 12.16 THOUSAND/UL (ref 4.31–10.16)

## 2019-12-13 PROCEDURE — 85025 COMPLETE CBC W/AUTO DIFF WBC: CPT

## 2019-12-13 PROCEDURE — 71046 X-RAY EXAM CHEST 2 VIEWS: CPT

## 2019-12-13 PROCEDURE — 86738 MYCOPLASMA ANTIBODY: CPT

## 2019-12-13 PROCEDURE — 99214 OFFICE O/P EST MOD 30 MIN: CPT | Performed by: INTERNAL MEDICINE

## 2019-12-13 PROCEDURE — 80053 COMPREHEN METABOLIC PANEL: CPT

## 2019-12-13 PROCEDURE — 36415 COLL VENOUS BLD VENIPUNCTURE: CPT

## 2019-12-13 RX ORDER — AZITHROMYCIN 250 MG/1
TABLET, FILM COATED ORAL
Qty: 10 TABLET | Refills: 0 | Status: SHIPPED | OUTPATIENT
Start: 2019-12-13 | End: 2019-12-20 | Stop reason: ALTCHOICE

## 2019-12-13 RX ORDER — GUAIFENESIN/DEXTROMETHORPHAN 100-10MG/5
5 SYRUP ORAL 3 TIMES DAILY PRN
Qty: 118 ML | Refills: 1 | Status: SHIPPED | OUTPATIENT
Start: 2019-12-13 | End: 2019-12-20 | Stop reason: ALTCHOICE

## 2019-12-13 RX ORDER — PREDNISONE 10 MG/1
TABLET ORAL
Qty: 20 TABLET | Refills: 0 | Status: SHIPPED | OUTPATIENT
Start: 2019-12-13 | End: 2019-12-20

## 2019-12-13 NOTE — PROGRESS NOTES
Assessment/Plan:         Diagnoses and all orders for this visit:    Pneumonia of right lower lobe due to infectious organism [facility]); Bed Rest, Increase Po Fluids, TRY :  -     XR chest pa & lateral; Future  -     Comprehensive metabolic panel; Future  -     CBC and differential; Future  -     azithromycin (ZITHROMAX) 250 mg tablet; Take two tabs daily with Lunch for 3 days then take one tab daily for 4 days then stop     -     predniSONE 10 mg tablet; Take 3 tabs daily with breakfast for 3 days then Take 2 tabs daily for 3 Days then take one tab daily for 3 days then stop     -     Mycoplasma Pneumoniae AB, IgG/IgM; Future  -     POCT peak flow  RC in 10 days  Acute bronchitis, unspecified organism  -     XR chest pa & lateral; Future  -     Comprehensive metabolic panel; Future  -     CBC and differential; Future  -     azithromycin (ZITHROMAX) 250 mg tablet; Take two tabs daily with Lunch for 3 days then take one tab daily for 4 days then stop     -     predniSONE 10 mg tablet; Take 3 tabs daily with breakfast for 3 days then Take 2 tabs daily for 3 Days then take one tab daily for 3 days then stop     -     Mycoplasma Pneumoniae AB, IgG/IgM; Future  -     dextromethorphan-guaiFENesin (ROBITUSSIN DM)  mg/5 mL syrup; Take 5 mL by mouth 3 (three) times a day as needed for cough or congestion  -     POCT peak flow    Skin lesions  -     Ambulatory referral to Dermatology; Future        Subjective:      Patient ID: Omer Barlow is a 69 y o  male  69 Y O Man is here for Regular Check up, and Re Check from last visit He still Coughing with sputum and SOB and wheezing , No Recent  CXR  No recent Blood work, med List reviewed w pt in Detail          The following portions of the patient's history were reviewed and updated as appropriate: allergies, current medications, past family history, past medical history, past social history, past surgical history and problem list     Review of Systems   Constitutional: Negative for chills, fatigue and fever  HENT: Positive for postnasal drip and sinus pressure  Negative for congestion, facial swelling, sore throat, trouble swallowing and voice change  Eyes: Negative for pain, discharge and visual disturbance  Respiratory: Positive for cough and wheezing  Negative for shortness of breath  Cardiovascular: Negative for chest pain, palpitations and leg swelling  Gastrointestinal: Negative for abdominal pain, blood in stool, constipation, diarrhea and nausea  Endocrine: Negative for polydipsia, polyphagia and polyuria  Genitourinary: Negative for difficulty urinating, hematuria and urgency  Musculoskeletal: Negative for arthralgias and myalgias  Skin: Negative for rash  Neurological: Negative for dizziness, tremors, weakness and headaches  Hematological: Negative for adenopathy  Does not bruise/bleed easily  Psychiatric/Behavioral: Negative for dysphoric mood, sleep disturbance and suicidal ideas  Objective:      /82 (BP Location: Right arm, Patient Position: Standing, Cuff Size: Standard)   Pulse 80   Temp 97 9 °F (36 6 °C) (Probe)   Resp 14   Ht 5' 6" (1 676 m)   Wt 72 7 kg (160 lb 4 8 oz)   SpO2 97%   BMI 25 87 kg/m²          Physical Exam   Constitutional: He is oriented to person, place, and time  He appears well-nourished  No distress  HENT:   Head: Normocephalic  Mouth/Throat: Oropharynx is clear and moist  No oropharyngeal exudate  Eyes: Pupils are equal, round, and reactive to light  Conjunctivae are normal  No scleral icterus  Neck: Neck supple  No thyromegaly present  Cardiovascular: Normal rate and regular rhythm  Murmur heard  Pulmonary/Chest: Effort normal  No respiratory distress  He has no wheezes  He has rales  RLL   Abdominal: Soft  Bowel sounds are normal  He exhibits no distension  There is no tenderness  There is no rebound and no guarding  Musculoskeletal: He exhibits no edema or tenderness  Lymphadenopathy:     He has no cervical adenopathy  Neurological: He is alert and oriented to person, place, and time  No cranial nerve deficit  Coordination normal    Skin: No erythema  No pallor  Psychiatric: He has a normal mood and affect

## 2019-12-16 LAB
M PNEUMO IGG SER IA-ACNC: <100 U/ML (ref 0–99)
M PNEUMO IGM SER IA-ACNC: <770 U/ML (ref 0–769)

## 2019-12-20 ENCOUNTER — OFFICE VISIT (OUTPATIENT)
Dept: FAMILY MEDICINE CLINIC | Facility: CLINIC | Age: 74
End: 2019-12-20
Payer: MEDICARE

## 2019-12-20 VITALS
RESPIRATION RATE: 14 BRPM | DIASTOLIC BLOOD PRESSURE: 82 MMHG | SYSTOLIC BLOOD PRESSURE: 138 MMHG | HEART RATE: 96 BPM | TEMPERATURE: 97.7 F | HEIGHT: 66 IN | OXYGEN SATURATION: 98 % | BODY MASS INDEX: 25.88 KG/M2 | WEIGHT: 161 LBS

## 2019-12-20 DIAGNOSIS — C91.10 CHRONIC LYMPHOCYTIC LEUKEMIA (HCC): ICD-10-CM

## 2019-12-20 DIAGNOSIS — N40.0 ENLARGED PROSTATE: ICD-10-CM

## 2019-12-20 DIAGNOSIS — J20.9 ACUTE BRONCHITIS, UNSPECIFIED ORGANISM: Primary | ICD-10-CM

## 2019-12-20 DIAGNOSIS — I10 ESSENTIAL HYPERTENSION: ICD-10-CM

## 2019-12-20 DIAGNOSIS — E78.49 OTHER HYPERLIPIDEMIA: ICD-10-CM

## 2019-12-20 PROBLEM — D32.9 MENINGIOMA (HCC): Status: ACTIVE | Noted: 2017-09-25

## 2019-12-20 PROCEDURE — 99213 OFFICE O/P EST LOW 20 MIN: CPT | Performed by: INTERNAL MEDICINE

## 2019-12-20 RX ORDER — PREDNISONE 1 MG/1
5 TABLET ORAL DAILY
Qty: 15 TABLET | Refills: 0 | Status: SHIPPED | OUTPATIENT
Start: 2019-12-20 | End: 2020-01-23 | Stop reason: ALTCHOICE

## 2019-12-20 RX ORDER — BENZONATATE 100 MG/1
100 CAPSULE ORAL 3 TIMES DAILY PRN
Qty: 20 CAPSULE | Refills: 0 | Status: SHIPPED | OUTPATIENT
Start: 2019-12-20 | End: 2020-01-23 | Stop reason: ALTCHOICE

## 2019-12-20 NOTE — PROGRESS NOTES
Assessment/Plan:         Diagnoses and all orders for this visit:    Acute bronchitis, unspecified organism; improving nicely, increase po fluids,   -     predniSONE 5 mg tablet; Take 1 tablet (5 mg total) by mouth daily  -     benzonatate (TESSALON PERLES) 100 mg capsule; Take 1 capsule (100 mg total) by mouth 3 (three) times a day as needed for cough  RTC in 1mo w Blood work    Essential hypertension; stable  Continue Bystolic  RTC in 3mos w Blood work        Subjective:      Patient ID: Kylee Huang is a 69 y o  male  69 Y O Man is here for Regular check up and Re check from last Visit, He feels Better more Than 70%, Recent Blood work,CXR,and Med List Reviewed w pt in Detail    The following portions of the patient's history were reviewed and updated as appropriate: allergies, current medications, past family history, past medical history, past social history, past surgical history and problem list     Review of Systems   Constitutional: Negative for chills, fatigue and fever  HENT: Positive for sinus pressure and sore throat  Negative for congestion, facial swelling, trouble swallowing and voice change  Eyes: Negative for pain, discharge and visual disturbance  Respiratory: Positive for cough  Negative for shortness of breath and wheezing  Cardiovascular: Negative for chest pain, palpitations and leg swelling  Gastrointestinal: Negative for abdominal pain, blood in stool, constipation, diarrhea and nausea  Endocrine: Negative for polydipsia, polyphagia and polyuria  Genitourinary: Negative for difficulty urinating, hematuria and urgency  Musculoskeletal: Negative for arthralgias and myalgias  Skin: Negative for rash  Neurological: Negative for dizziness, tremors, weakness and headaches  Hematological: Negative for adenopathy  Does not bruise/bleed easily  Psychiatric/Behavioral: Negative for dysphoric mood, sleep disturbance and suicidal ideas           Objective:      BP 138/82 (BP Location: Left arm, Patient Position: Sitting, Cuff Size: Standard)   Pulse 96   Temp 97 7 °F (36 5 °C) (Tympanic)   Resp 14   Ht 5' 6" (1 676 m)   Wt 73 kg (161 lb)   SpO2 98%   BMI 25 99 kg/m²          Physical Exam   Constitutional: He is oriented to person, place, and time  He appears well-nourished  No distress  HENT:   Head: Normocephalic  Mouth/Throat: Oropharynx is clear and moist  No oropharyngeal exudate  Improving URI   Eyes: Pupils are equal, round, and reactive to light  Conjunctivae are normal  No scleral icterus  Neck: Neck supple  No thyromegaly present  Cardiovascular: Normal rate and regular rhythm  Murmur heard  Pulmonary/Chest: Effort normal  No respiratory distress  He has wheezes  He has no rales  Abdominal: Soft  Bowel sounds are normal  He exhibits no distension  There is no tenderness  There is no rebound and no guarding  Musculoskeletal: He exhibits no edema or tenderness  Lymphadenopathy:     He has no cervical adenopathy  Neurological: He is alert and oriented to person, place, and time  No cranial nerve deficit  Coordination normal    Skin: No erythema  No pallor  Psychiatric: He has a normal mood and affect

## 2020-01-02 DIAGNOSIS — B37.0 THRUSH: Primary | ICD-10-CM

## 2020-01-05 ENCOUNTER — HOSPITAL ENCOUNTER (EMERGENCY)
Facility: HOSPITAL | Age: 75
Discharge: HOME | End: 2020-01-05
Attending: EMERGENCY MEDICINE | Admitting: EMERGENCY MEDICINE
Payer: MEDICARE

## 2020-01-05 VITALS
BODY MASS INDEX: 25.66 KG/M2 | HEART RATE: 66 BPM | WEIGHT: 159 LBS | RESPIRATION RATE: 16 BRPM | SYSTOLIC BLOOD PRESSURE: 144 MMHG | DIASTOLIC BLOOD PRESSURE: 80 MMHG | OXYGEN SATURATION: 96 % | TEMPERATURE: 97.7 F

## 2020-01-05 DIAGNOSIS — R21 RASH AND NONSPECIFIC SKIN ERUPTION: Primary | ICD-10-CM

## 2020-01-05 LAB
ANION GAP SERPL CALCULATED.3IONS-SCNC: 6 MMOL/L (ref 4–13)
BASOPHILS # BLD AUTO: 0.04 THOUSANDS/ΜL (ref 0–0.1)
BASOPHILS NFR BLD AUTO: 1 % (ref 0–1)
BUN SERPL-MCNC: 18 MG/DL (ref 5–25)
CALCIUM SERPL-MCNC: 9 MG/DL (ref 8.3–10.1)
CHLORIDE SERPL-SCNC: 105 MMOL/L (ref 100–108)
CO2 SERPL-SCNC: 30 MMOL/L (ref 21–32)
CREAT SERPL-MCNC: 0.99 MG/DL (ref 0.6–1.3)
EOSINOPHIL # BLD AUTO: 0.46 THOUSAND/ΜL (ref 0–0.61)
EOSINOPHIL NFR BLD AUTO: 10 % (ref 0–6)
ERYTHROCYTE [DISTWIDTH] IN BLOOD BY AUTOMATED COUNT: 13.9 % (ref 11.6–15.1)
ERYTHROCYTE [SEDIMENTATION RATE] IN BLOOD: 3 MM/HOUR (ref 0–10)
GFR SERPL CREATININE-BSD FRML MDRD: 75 ML/MIN/1.73SQ M
GLUCOSE SERPL-MCNC: 108 MG/DL (ref 65–140)
HCT VFR BLD AUTO: 47.7 % (ref 36.5–49.3)
HGB BLD-MCNC: 15.6 G/DL (ref 12–17)
IMM GRANULOCYTES # BLD AUTO: 0.02 THOUSAND/UL (ref 0–0.2)
IMM GRANULOCYTES NFR BLD AUTO: 1 % (ref 0–2)
LYMPHOCYTES # BLD AUTO: 0.37 THOUSANDS/ΜL (ref 0.6–4.47)
LYMPHOCYTES NFR BLD AUTO: 8 % (ref 14–44)
MCH RBC QN AUTO: 29.9 PG (ref 26.8–34.3)
MCHC RBC AUTO-ENTMCNC: 32.7 G/DL (ref 31.4–37.4)
MCV RBC AUTO: 92 FL (ref 82–98)
MONOCYTES # BLD AUTO: 0.78 THOUSAND/ΜL (ref 0.17–1.22)
MONOCYTES NFR BLD AUTO: 18 % (ref 4–12)
NEUTROPHILS # BLD AUTO: 2.74 THOUSANDS/ΜL (ref 1.85–7.62)
NEUTS SEG NFR BLD AUTO: 62 % (ref 43–75)
NRBC BLD AUTO-RTO: 0 /100 WBCS
PLATELET # BLD AUTO: 248 THOUSANDS/UL (ref 149–390)
PMV BLD AUTO: 10 FL (ref 8.9–12.7)
POTASSIUM SERPL-SCNC: 4.1 MMOL/L (ref 3.5–5.3)
RBC # BLD AUTO: 5.21 MILLION/UL (ref 3.88–5.62)
SODIUM SERPL-SCNC: 141 MMOL/L (ref 136–145)
WBC # BLD AUTO: 4.41 THOUSAND/UL (ref 4.31–10.16)

## 2020-01-05 PROCEDURE — 80048 BASIC METABOLIC PNL TOTAL CA: CPT | Performed by: EMERGENCY MEDICINE

## 2020-01-05 PROCEDURE — 99283 EMERGENCY DEPT VISIT LOW MDM: CPT | Performed by: EMERGENCY MEDICINE

## 2020-01-05 PROCEDURE — 85025 COMPLETE CBC W/AUTO DIFF WBC: CPT | Performed by: EMERGENCY MEDICINE

## 2020-01-05 PROCEDURE — 36415 COLL VENOUS BLD VENIPUNCTURE: CPT | Performed by: EMERGENCY MEDICINE

## 2020-01-05 PROCEDURE — 99283 EMERGENCY DEPT VISIT LOW MDM: CPT

## 2020-01-05 PROCEDURE — 85652 RBC SED RATE AUTOMATED: CPT | Performed by: EMERGENCY MEDICINE

## 2020-01-05 RX ORDER — DIPHENHYDRAMINE HCL 25 MG
25 TABLET ORAL EVERY 6 HOURS
Qty: 20 TABLET | Refills: 0 | Status: SHIPPED | OUTPATIENT
Start: 2020-01-05 | End: 2020-01-28 | Stop reason: ALTCHOICE

## 2020-01-05 RX ORDER — PREDNISONE 10 MG/1
TABLET ORAL
Qty: 48 TABLET | Refills: 0 | Status: SHIPPED | OUTPATIENT
Start: 2020-01-05 | End: 2020-01-23 | Stop reason: ALTCHOICE

## 2020-01-05 RX ORDER — ALLOPURINOL 100 MG/1
100 TABLET ORAL DAILY
COMMUNITY
Start: 2019-12-26 | End: 2020-01-23 | Stop reason: SDUPTHER

## 2020-01-05 NOTE — ED PROVIDER NOTES
History  Chief Complaint   Patient presents with    Rash     Patient has red spot rash over most of body, patient has been on several courses of antibiotics for recent illness, hx, of leukemia  71-year-old male currently undergoing chemotherapy for leukemia presents for evaluation of rash which started gradually last evening  Patient complains of red spots all over his body but this spares his face and mouth  States started gradually last evening, has been constant and is unchanged today  He states that it is not painful but it is mildly itchy  He has never had a rash similar to this in the past   He denies any new exposures  He states he started allopurinol over 1 week ago  He takes chemotherapy nightly but has been on the same medication for 2 years  No exposure to similar rash  His chest pain, shortness of breath, itchy scratchy throat, difficulty swallowing, mild pain, mouth sores, chest tightness, abdominal pain, nausea vomiting, diarrhea, fevers, chills  History provided by:  Patient  Rash   Associated symptoms: no abdominal pain, no diarrhea, no fatigue, no fever, no joint pain, no myalgias, no nausea, no shortness of breath, no sore throat, not vomiting and not wheezing        Prior to Admission Medications   Prescriptions Last Dose Informant Patient Reported? Taking?    BYSTOLIC 10 MG tablet   No No   Sig: take 1 tablet by mouth once daily   IMBRUVICA 280 MG TABS   Yes No   allopurinol (ZYLOPRIM) 100 mg tablet   Yes Yes   Sig: Take 100 mg by mouth daily   benzonatate (TESSALON PERLES) 100 mg capsule   No No   Sig: Take 1 capsule (100 mg total) by mouth 3 (three) times a day as needed for cough   celecoxib (CeleBREX) 200 mg capsule   No No   Sig: Take 1 capsule (200 mg total) by mouth daily With food/Breakfast   nystatin (MYCOSTATIN) 500,000 units/5 mL suspension   No No   Sig: Apply 5 mL (500,000 Units total) to the mouth or throat 4 (four) times a day for 7 days   predniSONE 5 mg tablet No No   Sig: Take 1 tablet (5 mg total) by mouth daily   tamsulosin (FLOMAX) 0 4 mg   Yes No   Sig: take 1 capsule by oral route once daily      Facility-Administered Medications: None       Past Medical History:   Diagnosis Date    Hypertension     Leukemia ([facility] [address] )     Osteoarthritis 9/26/2012       History reviewed  No pertinent surgical history  Family History   Problem Relation Age of Onset    Diabetes Mother     Heart disease Father      I have reviewed and agree with the history as documented  Social History     Tobacco Use    Smoking status: Never Smoker    Smokeless tobacco: Never Used    Tobacco comment: No passive smoke exposure   Substance Use Topics    Alcohol use: No    Drug use: No        Review of Systems   Constitutional: Negative for activity change, appetite change, fatigue and fever  HENT: Negative for congestion, dental problem, drooling, ear pain, mouth sores, nosebleeds, rhinorrhea, sinus pain, sore throat, trouble swallowing and voice change  Eyes: Negative for pain and redness  Respiratory: Negative for chest tightness, shortness of breath and wheezing  Cardiovascular: Negative for chest pain and palpitations  Gastrointestinal: Negative for abdominal pain, blood in stool, constipation, diarrhea, nausea and vomiting  Endocrine: Negative for cold intolerance and heat intolerance  Genitourinary: Negative for dysuria, frequency and hematuria  Musculoskeletal: Negative for arthralgias and myalgias  Skin: Positive for rash  Negative for color change and pallor  Neurological: Negative for weakness and numbness  Hematological: Does not bruise/bleed easily  Psychiatric/Behavioral: Negative for agitation, hallucinations and suicidal ideas  Physical Exam  Physical Exam   Constitutional: He is oriented to person, place, and time  He appears well-developed and well-nourished  HENT:   Mouth/Throat: No oropharyngeal exudate     TMs normal bilaterally no pharyngeal erythema no rhinorrhea nontender palpation of sinuses, normal looking turbinates   Eyes: Conjunctivae and EOM are normal    Neck: Normal range of motion  Neck supple  No meningeal signs   Cardiovascular: Normal rate, regular rhythm, normal heart sounds and intact distal pulses  Pulmonary/Chest: Effort normal and breath sounds normal  No respiratory distress  He has no wheezes  He has no rales  He exhibits no tenderness  Abdominal: Soft  Bowel sounds are normal  He exhibits no distension and no mass  There is no tenderness  No hernia  No cvat   Musculoskeletal: Normal range of motion  He exhibits no edema  Lymphadenopathy:     He has no cervical adenopathy  Neurological: He is alert and oriented to person, place, and time  No cranial nerve deficit  Skin:   Please refer to media image for description of rash  The rash is located on bilateral arms, bilateral legs, diffuse trunk  There is no oral mucosal involvement  The rash is easily blanchable  Nontender palpation  Psychiatric: He has a normal mood and affect  His behavior is normal    Nursing note and vitals reviewed        Vital Signs  ED Triage Vitals [01/05/20 0741]   Temperature Pulse Respirations Blood Pressure SpO2   97 7 °F (36 5 °C) 76 16 158/94 98 %      Temp Source Heart Rate Source Patient Position - Orthostatic VS BP Location FiO2 (%)   Temporal Monitor Sitting Right arm --      Pain Score       No Pain           Vitals:    01/05/20 0741   BP: 158/94   Pulse: 76   Patient Position - Orthostatic VS: Sitting         Visual Acuity      ED Medications  Medications - No data to display    Diagnostic Studies  Results Reviewed     Procedure Component Value Units Date/Time    Basic metabolic panel [[id number]] Collected:  01/05/20 0839    Lab Status:  Final result Specimen:  Blood from Arm, Left Updated:  01/05/20 0859     Sodium 141 mmol/L      Potassium 4 1 mmol/L      Chloride 105 mmol/L      CO2 30 mmol/L      ANION GAP 6 mmol/L BUN 18 mg/dL      Creatinine 0 99 mg/dL      Glucose 108 mg/dL      Calcium 9 0 mg/dL      eGFR 75 ml/min/1 73sq m     Narrative:       [place] guidelines for Chronic Kidney Disease (CKD):     Stage 1 with normal or high GFR (GFR > 90 mL/min/1 73 square meters)    Stage 2 Mild CKD (GFR = 60-89 mL/min/1 73 square meters)    Stage 3A Moderate CKD (GFR = 45-59 mL/min/1 73 square meters)    Stage 3B Moderate CKD (GFR = 30-44 mL/min/1 73 square meters)    Stage 4 Severe CKD (GFR = 15-29 mL/min/1 73 square meters)    Stage 5 End Stage CKD (GFR <15 mL/min/1 73 square meters)  Note: GFR calculation is accurate only with a steady state creatinine    CBC and differential [[id number]]  (Abnormal) Collected:  01/05/20 0839    Lab Status:  Final result Specimen:  Blood from Arm, Left Updated:  01/05/20 0845     WBC 4 41 Thousand/uL      RBC 5 21 Million/uL      Hemoglobin 15 6 g/dL      Hematocrit 47 7 %      MCV 92 fL      MCH 29 9 pg      MCHC 32 7 g/dL      RDW 13 9 %      MPV 10 0 fL      Platelets 012 Thousands/uL      nRBC 0 /100 WBCs      Neutrophils Relative 62 %      Immat GRANS % 1 %      Lymphocytes Relative 8 %      Monocytes Relative 18 %      Eosinophils Relative 10 %      Basophils Relative 1 %      Neutrophils Absolute 2 74 Thousands/µL      Immature Grans Absolute 0 02 Thousand/uL      Lymphocytes Absolute 0 37 Thousands/µL      Monocytes Absolute 0 78 Thousand/µL      Eosinophils Absolute 0 46 Thousand/µL      Basophils Absolute 0 04 Thousands/µL     Sedimentation rate, automated [[id number]] Collected:  01/05/20 0839    Lab Status:   In process Specimen:  Blood from Arm, Left Updated:  01/05/20 0841                 No orders to display              Procedures  Procedures         ED Course                               MDM  Number of Diagnoses or Management Options  Rash and nonspecific skin eruption:   Diagnosis management comments: Rash in patient currently undergoing chemo treatment with a benign exam-will check labs, treat symptoms, PCP/Oncology follow-up  Disposition  Final diagnoses:   Rash and nonspecific skin eruption     Time reflects when diagnosis was documented in both MDM as applicable and the Disposition within this note     Time User Action Codes Description Comment    1/5/2020  9:31 AM Angela Turcios Add [R21] Rash and nonspecific skin eruption       ED Disposition     ED Disposition Condition Date/Time Comment    Discharge Stable Sun Jan 5, 2020  9:31 AM Hua Clock discharge to home/self care  Follow-up Information     Follow up With Specialties Details Why Jj Martinez MD Internal Medicine Call today  [address] [facility]  [phone]            Patient's Medications   Discharge Prescriptions    DIPHENHYDRAMINE (BENADRYL) 25 MG TABLET    Take 1 tablet (25 mg total) by mouth every 6 (six) hours       Start Date: 1/5/2020  End Date: --       Order Dose: 25 mg       Quantity: 20 tablet    Refills: 0    PREDNISONE 10 MG TABLET    Take 5 tabs x 4 days, then 4 tabs x 4 days, then 3 tabs x 2 days, then 2 tabs x 2 days, then 1 tab x 2 days       Start Date: 1/5/2020  End Date: --       Order Dose: --       Quantity: 48 tablet    Refills: 0     No discharge procedures on file      ED Provider  Electronically Signed by           Fran Aceves MD  01/05/20 5631

## 2020-01-23 ENCOUNTER — OFFICE VISIT (OUTPATIENT)
Dept: FAMILY MEDICINE CLINIC | Facility: CLINIC | Age: 75
End: 2020-01-23
Payer: MEDICARE

## 2020-01-23 VITALS
SYSTOLIC BLOOD PRESSURE: 138 MMHG | RESPIRATION RATE: 14 BRPM | HEART RATE: 98 BPM | TEMPERATURE: 97.5 F | WEIGHT: 162 LBS | HEIGHT: 66 IN | BODY MASS INDEX: 26.03 KG/M2 | OXYGEN SATURATION: 98 % | DIASTOLIC BLOOD PRESSURE: 76 MMHG

## 2020-01-23 DIAGNOSIS — N40.0 ENLARGED PROSTATE: ICD-10-CM

## 2020-01-23 DIAGNOSIS — I48.91 RAPID ATRIAL FIBRILLATION (HCC): Primary | ICD-10-CM

## 2020-01-23 DIAGNOSIS — D32.9 MENINGIOMA (HCC): ICD-10-CM

## 2020-01-23 DIAGNOSIS — I10 ESSENTIAL HYPERTENSION, MALIGNANT: ICD-10-CM

## 2020-01-23 DIAGNOSIS — M25.412 PAIN AND SWELLING OF LEFT SHOULDER: ICD-10-CM

## 2020-01-23 DIAGNOSIS — R22.9 LUMP OF SKIN: ICD-10-CM

## 2020-01-23 DIAGNOSIS — Z23 NEED FOR INFLUENZA VACCINATION: ICD-10-CM

## 2020-01-23 DIAGNOSIS — C91.10 CHRONIC LYMPHOCYTIC LEUKEMIA (HCC): ICD-10-CM

## 2020-01-23 DIAGNOSIS — M25.512 PAIN AND SWELLING OF LEFT SHOULDER: ICD-10-CM

## 2020-01-23 DIAGNOSIS — M1A.9XX0 CHRONIC GOUT WITHOUT TOPHUS, UNSPECIFIED CAUSE, UNSPECIFIED SITE: ICD-10-CM

## 2020-01-23 PROCEDURE — 99214 OFFICE O/P EST MOD 30 MIN: CPT | Performed by: INTERNAL MEDICINE

## 2020-01-23 PROCEDURE — 93000 ELECTROCARDIOGRAM COMPLETE: CPT | Performed by: INTERNAL MEDICINE

## 2020-01-23 RX ORDER — ALLOPURINOL 100 MG/1
100 TABLET ORAL DAILY
Qty: 30 TABLET | Refills: 0 | Status: SHIPPED | OUTPATIENT
Start: 2020-01-23 | End: 2020-01-23

## 2020-01-23 RX ORDER — TAMSULOSIN HYDROCHLORIDE 0.4 MG/1
0.4 CAPSULE ORAL
Qty: 90 CAPSULE | Refills: 3 | Status: SHIPPED | OUTPATIENT
Start: 2020-01-23 | End: 2021-05-13 | Stop reason: SDUPTHER

## 2020-01-23 RX ORDER — DIGOXIN 250 MCG
250 TABLET ORAL DAILY
Qty: 90 TABLET | Refills: 3 | Status: SHIPPED | OUTPATIENT
Start: 2020-01-23 | End: 2020-02-06

## 2020-01-23 RX ORDER — IBRUTINIB 280 MG/1
280 TABLET, FILM COATED ORAL DAILY
Qty: 90 TABLET | Refills: 3 | Status: CANCELLED | OUTPATIENT
Start: 2020-01-23

## 2020-01-23 RX ORDER — NEBIVOLOL 10 MG/1
10 TABLET ORAL DAILY
Qty: 90 TABLET | Refills: 3 | Status: SHIPPED | OUTPATIENT
Start: 2020-01-23 | End: 2021-01-29

## 2020-01-23 NOTE — PROGRESS NOTES
Assessment/Plan:         Diagnoses and all orders for this visit:    Rapid atrial fibrillation ([facility] [address] ); continue Bystolic 10 mg Daily  Add :  -     digoxin (LANOXIN) 0 25 mg tablet; Take 1 tablet (250 mcg total) by mouth daily In the evening  -     apixaban (ELIQUIS) 5 mg; Take 1 tablet (5 mg total) by mouth 2 (two) times a day  -     Comprehensive metabolic panel; Future  -     CBC and differential; Future  -     Magnesium; Future  -     Thyroid Antibodies Panel; Future  -     T4, free; Future  -     TSH, 3rd generation; Future  -     UA (URINE) with reflex to Scope  -     Ambulatory referral to Cardiology;  ASAP     -     Echo complete with contrast if indicated; ASAP  Gutierrez Bryson     -     POCT ECG  I had a Long discussion with pt to send him to ER, But He refused, and Insisted on out patient Treatment as above    RTC in 1mo  Need for influenza vaccination ; Refused    Essential hypertension, : Stable, Renew :  -     nebivolol (BYSTOLIC) 10 mg tablet; Take 1 tablet (10 mg total) by mouth daily    Meningioma ([facility] [address] ); stable  FU w CTA  FU w Neurology    Chronic lymphocytic leukemia ([facility] [address] ); Continue :  -     Ibrutinib (IMBRUVICA) 140 MG TABS; Take 140 mg by mouth 2 (two) times a day  FU w hematology/Oncology  Chronic gout without tophus, unspecified cause, unspecified site  -     allopurinol (ZYLOPRIM) 100 mg tablet; Take 1 tablet (100 mg total) by mouth daily    Enlarged prostate  -     tamsulosin (FLOMAX) 0 4 mg; Take 1 capsule (0 4 mg total) by mouth daily with dinner    Lump of skin; on Left shoulder ? ?   -     Ambulatory referral to General Surgery; Future    Pain and swelling of left shoulder  -     XR shoulder 2+ vw left; Future    Other orders  -     Cancel: influenza vaccine, [id number], high-dose, PF 0 5 mL (FLUZONE HIGH-DOSE)  -     Cancel: IMBRUVICA 280 MG TABS; Take 280 mg by mouth daily        Subjective:      Patient ID: Cayetano Solo is a 69 y o  male      [address] is here for Regular check up, also two days ago he felt dizziness and palpitations, Today he feels Ok, No recent Blood work, med List reviewed w pt in Detail    The following portions of the patient's history were reviewed and updated as appropriate: allergies, current medications, past family history, past medical history, past social history, past surgical history and problem list     Review of Systems   Constitutional: Negative for chills, fatigue and fever  HENT: Negative for congestion, facial swelling, sore throat, trouble swallowing and voice change  Eyes: Negative for pain, discharge and visual disturbance  Respiratory: Negative for cough, shortness of breath and wheezing  Cardiovascular: Negative for chest pain, palpitations and leg swelling  Gastrointestinal: Negative for abdominal pain, blood in stool, constipation, diarrhea and nausea  Endocrine: Negative for polydipsia, polyphagia and polyuria  Genitourinary: Negative for difficulty urinating, hematuria and urgency  Musculoskeletal: Negative for arthralgias and myalgias  Skin: Negative for rash  Neurological: Positive for dizziness  Negative for tremors, weakness and headaches  Hematological: Negative for adenopathy  Does not bruise/bleed easily  Psychiatric/Behavioral: Negative for dysphoric mood, sleep disturbance and suicidal ideas  Objective:      /76 (BP Location: Left arm, Patient Position: Sitting, Cuff Size: Standard)   Pulse 98   Temp 97 5 °F (36 4 °C) (Tympanic)   Resp 14   Ht 5' 6" (1 676 m)   Wt 73 5 kg (162 lb)   SpO2 98%   BMI 26 15 kg/m²          Physical Exam   Constitutional: He is oriented to person, place, and time  He appears well-nourished  No distress  HENT:   Head: Normocephalic  Mouth/Throat: Oropharynx is clear and moist  No oropharyngeal exudate  Eyes: Pupils are equal, round, and reactive to light  Conjunctivae are normal  No scleral icterus  Neck: Neck supple  No thyromegaly present     Cardiovascular: Normal rate    Murmur heard  Fast ,Irregular   Pulmonary/Chest: Effort normal and breath sounds normal  No respiratory distress  He has no wheezes  He has no rales  Abdominal: Soft  Bowel sounds are normal  He exhibits no distension  There is no tenderness  There is no rebound and no guarding  Musculoskeletal: He exhibits no edema or tenderness  Lymphadenopathy:     He has no cervical adenopathy  Neurological: He is alert and oriented to person, place, and time  No cranial nerve deficit  Coordination normal    Skin: No erythema  Solid Lump on top of left shoulder ? ??   Psychiatric: He has a normal mood and affect

## 2020-01-28 ENCOUNTER — CONSULT (OUTPATIENT)
Dept: CARDIOLOGY CLINIC | Facility: CLINIC | Age: 75
End: 2020-01-28
Payer: MEDICARE

## 2020-01-28 VITALS
WEIGHT: 163.4 LBS | BODY MASS INDEX: 26.26 KG/M2 | HEART RATE: 85 BPM | HEIGHT: 66 IN | SYSTOLIC BLOOD PRESSURE: 160 MMHG | DIASTOLIC BLOOD PRESSURE: 80 MMHG

## 2020-01-28 DIAGNOSIS — I48.91 ATRIAL FIBRILLATION, NEW ONSET (HCC): ICD-10-CM

## 2020-01-28 DIAGNOSIS — C91.10 CHRONIC LYMPHOCYTIC LEUKEMIA (HCC): ICD-10-CM

## 2020-01-28 DIAGNOSIS — I10 ESSENTIAL HYPERTENSION: ICD-10-CM

## 2020-01-28 DIAGNOSIS — E78.2 MIXED HYPERLIPIDEMIA: ICD-10-CM

## 2020-01-28 DIAGNOSIS — I27.20 MILD PULMONARY HYPERTENSION (HCC): ICD-10-CM

## 2020-01-28 DIAGNOSIS — I48.91 RAPID ATRIAL FIBRILLATION (HCC): Primary | ICD-10-CM

## 2020-01-28 PROCEDURE — 99214 OFFICE O/P EST MOD 30 MIN: CPT | Performed by: INTERNAL MEDICINE

## 2020-01-28 PROCEDURE — 93000 ELECTROCARDIOGRAM COMPLETE: CPT | Performed by: INTERNAL MEDICINE

## 2020-01-28 RX ORDER — LOSARTAN POTASSIUM 25 MG/1
25 TABLET ORAL DAILY
Qty: 30 TABLET | Refills: 6 | Status: SHIPPED | OUTPATIENT
Start: 2020-01-28 | End: 2020-02-06

## 2020-01-28 NOTE — PATIENT INSTRUCTIONS
CARDIOLOGY ASSESSMENT & PLAN:   Diagnosis ICD-10-CM Associated Orders   1  Rapid atrial fibrillation (HCC) I48 91 Ambulatory referral to Cardiology     POCT ECG     Digoxin level     TSH+Free T4   2  Essential hypertension I10 losartan (COZAAR) 25 mg tablet   3  Mixed hyperlipidemia E78 2    4  Chronic lymphocytic leukemia (HCC) C91 10    5  Atrial fibrillation, new onset (HCC) I48 91 losartan (COZAAR) 25 mg tablet     COMPREHENSIVE METABOLIC WSDPR(46)   6  Mild pulmonary hypertension (HCC) I27 20        Atrial fibrillation, new onset [facility]  78-year-old gentleman with newly identified atrial fibrillation with minimal symptoms  Review of his record indicates that his heartbeat was normal on 5th of January when he went to emergency room and he was noted to be in atrial fibrillation on January 23rd suggesting that it began somewhere in between  He has no prior history of AFib and has no known thyroid disease  He gives no history suggestive of obstructive sleep apnea  Blood work for thyroid is still pending  Today we discussed the mechanics some of atrial fibrillation  I explained to him that atrial fibrillation requires a substrate and a trigger  Substrate in his case is likely his age and hypertension and hypertensive heart disease and CLL  Trigger in his case is most likely recent URI and steroid use  We also discussed the risk associated with atrial fibrillation including risk of stroke and of developing congestive heart failure  He is on good medical therapy and his heart rate is controlled  Blood pressure is still elevated  His physical examination does not reveal significant valvular heart disease  He is already on anticoagulation and tolerating it although he does have frequent minor bruising  His CHADS2 Vasc score is greater than 3 and he should be anticoagulated  Given recent onset atrial fibrillation and the associated risks and comorbidities, restoration of sinus rhythm is recommended  He will need a transesophageal echocardiogram and DC cardioversion, procedures that can be performed as ambulatory at the hospital   I have discussed these procedures briefly with him  He would like to discuss with his wife and make up his mind  For now:  - he will continue his current medical therapy with bisoprolol, digoxin and Eliquis and other medications  - we are adding losartan 25 mg once daily to his medical regimen for additional blood pressure control   - he will have blood work including thyroid function tests and digoxin level check  - he will have transthoracic echocardiogram to reassess cardiac structure and function   - I have advised him that after discussing with his wife if he is agreeable he should call our office to so that FRANK and cardioversion can be scheduled  I will follow up with him within 1 month to assess response his current therapy  - he may continue his current chemotherapy with ibrutinib  - he is advised to be very conscious about his bleeding risk and take necessary precautions  He should inform us if he develops any bleeding or has excessive bruising

## 2020-01-28 NOTE — ASSESSMENT & PLAN NOTE
20-year-old gentleman with newly identified atrial fibrillation with minimal symptoms  Review of his record indicates that his heartbeat was normal on 5th of January when he went to emergency room and he was noted to be in atrial fibrillation on January 23rd suggesting that it began somewhere in between  He has no prior history of AFib and has no known thyroid disease  He gives no history suggestive of obstructive sleep apnea  Blood work for thyroid is still pending  Today we discussed the mechanics some of atrial fibrillation  I explained to him that atrial fibrillation requires a substrate and a trigger  Substrate in his case is likely his age and hypertension and hypertensive heart disease and CLL  Trigger in his case is most likely recent URI and steroid use  We also discussed the risk associated with atrial fibrillation including risk of stroke and of developing congestive heart failure  He is on good medical therapy and his heart rate is controlled  Blood pressure is still elevated  His physical examination does not reveal significant valvular heart disease  He is already on anticoagulation and tolerating it although he does have frequent minor bruising  His CHADS2 Vasc score is greater than 3 and he should be anticoagulated  Given recent onset atrial fibrillation and the associated risks and comorbidities, restoration of sinus rhythm is recommended  He will need a transesophageal echocardiogram and DC cardioversion, procedures that can be performed as ambulatory at the hospital   I have discussed these procedures briefly with him  He would like to discuss with his wife and make up his mind  For now:  - he will continue his current medical therapy with bisoprolol, digoxin and Eliquis and other medications    - we are adding losartan 25 mg once daily to his medical regimen for additional blood pressure control   - he will have blood work including thyroid function tests and digoxin level check  - he will have transthoracic echocardiogram to reassess cardiac structure and function   - I have advised him that after discussing with his wife if he is agreeable he should call our office to so that FRANK and cardioversion can be scheduled  I will follow up with him within 1 month to assess response his current therapy  - he may continue his current chemotherapy with ibrutinib  - he is advised to be very conscious about his bleeding risk and take necessary precautions  He should inform us if he develops any bleeding or has excessive bruising

## 2020-01-28 NOTE — PROGRESS NOTES
[facility]  [address] [address]  Phone#  [phone]  Fax#  [phone]  *-*-*-*-*-*-*-*-*-*-*-*-*-*-*-*-*-*-*-*-*-*-*-*-*-*-*-*-*-*-*-*-*-*-*-*-*-*-*-*-*-*-*-*-*-*-*-*-*-*-*-*-*-*  Neptali Jaime DATE: 01/28/20 4:59 PM  PATIENT NAME: Riley Wong   1945    [id number]  Age: 69 y o  Sex: male  AUTHOR: eMly Sanchez MD  [facility] PHYSICIAN: Stephen Calderón MD  REFERRING PHYSICIAN: Stephen Calderón MD  [address] [place] [address] Virgilio Sierra MD   *-*-*-*-*-*-*-*-*-*-*-*-*-*-*-*-*-*-*-*-*-*-*-*-*-*-*-*-*-*-*-*-*-*-*-*-*-*-*-*-*-*-*-*-*-*-*-*-*-*-*-*-*-*-  REASON FOR REFERRAL:  Evaluation and treatment of newly identified atrial fibrillation rhythm  *-*-*-*-*-*-*-*-*-*-*-*-*-*-*-*-*-*-*-*-*-*-*-*-*-*-*-*-*-*-*-*-*-*-*-*-*-*-*-*-*-*-*-*-*-*-*-*-*-*-*-*-*-*-  CARDIOLOGY ASSESSMENT & PLAN:   Diagnosis ICD-10-CM Associated Orders   1  Rapid atrial fibrillation (HCC) I48 91 Ambulatory referral to Cardiology     POCT ECG     Digoxin level     TSH+Free T4   2  Essential hypertension I10 losartan (COZAAR) 25 mg tablet   3  Mixed hyperlipidemia E78 2    4  Chronic lymphocytic leukemia (HCC) C91 10    5  Atrial fibrillation, new onset (HCC) I48 91 losartan (COZAAR) 25 mg tablet     COMPREHENSIVE METABOLIC QTAHJ(65)   6  Mild pulmonary hypertension (HCC) I27 20        Atrial fibrillation, new onset [facility])  42-year-old gentleman with newly identified atrial fibrillation with minimal symptoms  Review of his record indicates that his heartbeat was normal on 5th of January when he went to emergency room and he was noted to be in atrial fibrillation on January 23rd suggesting that it began somewhere in between  He has no prior history of AFib and has no known thyroid disease  He gives no history suggestive of obstructive sleep apnea  Blood work for thyroid is still pending  Today we discussed the mechanics some of atrial fibrillation    I explained to him that atrial fibrillation requires a substrate and a trigger  Substrate in his case is likely his age and hypertension and hypertensive heart disease and CLL  Trigger in his case is most likely recent URI and steroid use  We also discussed the risk associated with atrial fibrillation including risk of stroke and of developing congestive heart failure  He is on good medical therapy and his heart rate is controlled  Blood pressure is still elevated  His physical examination does not reveal significant valvular heart disease  He is already on anticoagulation and tolerating it although he does have frequent minor bruising  His CHADS2 Vasc score is greater than 3 and he should be anticoagulated  Given recent onset atrial fibrillation and the associated risks and comorbidities, restoration of sinus rhythm is recommended  He will need a transesophageal echocardiogram and DC cardioversion, procedures that can be performed as ambulatory at the hospital   I have discussed these procedures briefly with him  He would like to discuss with his wife and make up his mind  For now:  - he will continue his current medical therapy with bisoprolol, digoxin and Eliquis and other medications  - we are adding losartan 25 mg once daily to his medical regimen for additional blood pressure control   - he will have blood work including thyroid function tests and digoxin level check  - he will have transthoracic echocardiogram to reassess cardiac structure and function   - I have advised him that after discussing with his wife if he is agreeable he should call our office to so that FRANK and cardioversion can be scheduled  I will follow up with him within 1 month to assess response his current therapy  - he may continue his current chemotherapy with ibrutinib  - he is advised to be very conscious about his bleeding risk and take necessary precautions    He should inform us if he develops any bleeding or has excessive bruising  *-*-*-*-*-*-*-*-*-*-*-*-*-*-*-*-*-*-*-*-*-*-*-*-*-*-*-*-*-*-*-*-*-*-*-*-*-*-*-*-*-*-*-*-*-*-*-*-*-*-*-*-*-*-  CURRENT ECG:  Results for orders placed or performed in visit on 01/28/20   POCT ECG    Narrative    Atrial fibrillation rhythm, HR 85 beats per minute, normal axis and intervals, nonspecific ST T-wave abnormalities  ECG from January 23rd indicates atrial fibrillation with rapid ventricular response with heart rate of 111 beats per minute  *-*-*-*-*-*-*-*-*-*-*-*-*-*-*-*-*-*-*-*-*-*-*-*-*-*-*-*-*-*-*-*-*-*-*-*-*-*-*-*-*-*-*-*-*-*-*-*-*-*-*-*-*-*-  HISTORY OF PRESENT ILLNESS:  Patient is a pleasant 58-year-old gentleman with past medical history significant for:  1  Chronic lymphocytic leukemia initially diagnosed in 2013, now with relapse, currently on ibrutinib and rituximab  2  Intracranial meningioma along the undersurface of right cerebellar tentorium diagnosed in 2017  3  Degenerative joint disease with arthritis  4  Chronic gout  5  Benign prostatic hypertrophy  6  Essential hypertension    Patient was recently seen by his primary care physician on 20/8 history of January and was noted to be tachycardic  ECG showed atrial fibrillation with rapid ventricular response  He was advised to go to the emergency room but since he did not have symptoms he did not want to go  He was started on rate control therapy with digoxin, along with anticoagulation therapy with Eliquis  An echocardiogram has been arranged and he is referred to us for further evaluation  Prior to that patient had been treated with multiple course of antibiotics for suspected upper respiratory infection however when symptoms did not improve he was treated with a 3 weeks course of steroids  Around the same time he was started on allopurinol for suspected gout following which he developed a rash and was evaluated in the emergency room on 5th of January      From a symptom perspective he denies any recent chest pain, unusual shortness of breath, orthopnea, PND, palpitations, presyncope/syncope, dizziness or lightheadedness or worsening edema  He is very active and works as carpentry and , work which involves significant physical exertion and activity which he says he has been able to perform without hindrance  He does mention that when he is lifting heavy stuff he gets short of breath but symptoms are transient and resolved when he briefly stops from the activity  His cancer care is provided by his heme oncologist at [facility] and he is currently on reduced dose of ibrutinib  *-*-*-*-*-*-*-*-*-*-*-*-*-*-*-*-*-*-*-*-*-*-*-*-*-*-*-*-*-*-*-*-*-*-*-*-*-*-*-*-*-*-*-*-*-*-*-*-*-*-*-*-*-*  PAST MEDICAL HISTORY:   1  Chronic lymphocytic leukemia initially diagnosed in , now with relapse, currently on ibrutinib and rituximab  2  Intracranial meningioma along the undersurface of right cerebellar tentorium diagnosed in 2017  3  Degenerative joint disease with arthritis  4  Chronic gout  5  Benign prostatic hypertrophy  6  Essential hypertension    PAST SURGICAL HISTORY:   No past surgical history on file  FAMILY HISTORY:  Family History   Problem Relation Age of Onset    Diabetes Mother     Heart disease Father      Mentions that his father  in his sleep probably due to heart problem at age of 76 years  His brother who is around 66 years now also has some heart problems  There is no family history of premature CAD or sudden cardiac death  SOCIAL HISTORY:  [unfilled]  Social History     Tobacco Use   Smoking Status Never Smoker   Smokeless Tobacco Never Used   Tobacco Comment    No passive smoke exposure     Social History     Substance and Sexual Activity   Alcohol Use No     Social History     Substance and Sexual Activity   Drug Use No   Has never smoked in his life  Alcohol use is occasionally    Has no history of illicit drug use     *-*-*-*-*-*-*-*-*-*-*-*-*-*-*-*-*-*-*-*-*-*-*-*-*-*-*-*-*-*-*-*-*-*-*-*-*-*-*-*-*-*-*-*-*-*-*-*-*-*-*-*-*-*  ALLERGIES:  Allergies   Allergen Reactions    Tetanus Antitoxin Rash     Patient told by mother medication was given as a child  Patient told by mother medication was given as a child    Tetanus Toxoid     Allopurinol Rash     *-*-*-*-*-*-*-*-*-*-*-*-*-*-*-*-*-*-*-*-*-*-*-*-*-*-*-*-*-*-*-*-*-*-*-*-*-*-*-*-*-*-*-*-*-*-*-*-*-*-*-*-*-*  CURRENT OUTPATIENT MEDICATIONS:     Current Outpatient Medications:     apixaban (ELIQUIS) 5 mg, Take 1 tablet (5 mg total) by mouth 2 (two) times a day, Disp: 180 tablet, Rfl: 3    digoxin (LANOXIN) 0 25 mg tablet, Take 1 tablet (250 mcg total) by mouth daily In the evening, Disp: 90 tablet, Rfl: 3    Ibrutinib (IMBRUVICA) 140 MG TABS, Take 140 mg by mouth 2 (two) times a day, Disp: 180 tablet, Rfl: 3    nebivolol (BYSTOLIC) 10 mg tablet, Take 1 tablet (10 mg total) by mouth daily, Disp: 90 tablet, Rfl: 3    tamsulosin (FLOMAX) 0 4 mg, Take 1 capsule (0 4 mg total) by mouth daily with dinner, Disp: 90 capsule, Rfl: 3    losartan (COZAAR) 25 mg tablet, Take 1 tablet (25 mg total) by mouth daily, Disp: 30 tablet, Rfl: 6    *-*-*-*-*-*-*-*-*-*-*-*-*-*-*-*-*-*-*-*-*-*-*-*-*-*-*-*-*-*-*-*-*-*-*-*-*-*-*-*-*-*-*-*-*-*-*-*-*-*-*-*-*-*  REVIEW OF SYMPTOMS:    Positive for:  Shortness of breath only when he exerts himself  Easy bruising and minor bleeding  Negative for: All remaining as reviewed below and in HPI  SYSTEM SYMPTOMS REVIEWED:  General--weight change, fever, night sweats  Respirato      Cardiovascular--chest pain, syncope, dyspnea on exertion, edema, decline in exercise tolerance, claudication   Gastrointestinal--persistent vomiting, diarrhea, abdominal distention, blood in stool   Muscular or skeletal--joint pain or swelling   Neurologic--headaches, syncope, abnormal movement  Hematologic--history of easy bruising and bleeding   Endocrine--thyroid enlargement, heat or cold intolerance, polyuria   Psychiatric--anxiety, depression     *-*-*-*-*-*-*-*-*-*-*-*-*-*-*-*-*-*-*-*-*-*-*-*-*-*-*-*-*-*-*-*-*-*-*-*-*-*-*-*-*-*-*-*-*-*-*-*-*-*-*-*-*-*-  VITAL SIGNS:  Vitals:    01/28/20 1604   BP: 160/80   Pulse: 85   Weight: 74 1 kg (163 lb 6 4 oz)   Height: 5' 6" (1 676 m)     Weight (last 2 days)     Date/Time   Weight    01/28/20 1604   74 1 (163 4)           ,   Wt Readings from Last 3 Encounters:   01/28/20 74 1 kg (163 lb 6 4 oz)   01/23/20 73 5 kg (162 lb)   01/05/20 72 1 kg (159 lb)    , Body mass index is 26 37 kg/m²  *-*-*-*-*-*-*-*-*-*-*-*-*-*-*-*-*-*-*-*-*-*-*-*-*-*-*-*-*-*-*-*-*-*-*-*-*-*-*-*-*-*-*-*-*-*-*-*-*-*-*-*-*-*-  PHYSICAL EXAM:  General Appearance:    Alert, cooperative, no distress, appears stated age   Head, Eyes, ENT:    No obvious abnormality, moist mucous mebranes  Neck:   Supple, no carotid bruit or JVD   Back:     Symmetric, no curvature  Lungs:     Respirations unlabored  Clear to auscultation bilaterally,    Chest wall:    No tenderness or deformity   Heart:     irregularly irregular rhythm, normal intensity heart sounds, unable to appreciate murmur   Abdomen:     Soft, non-tender, No obvious masses, or organomegaly   Extremities:   Extremities normal, no cyanosis or edema    Skin:   Skin color, texture, turgor normal, no rashes or lesions     *-*-*-*-*-*-*-*-*-*-*-*-*-*-*-*-*-*-*-*-*-*-*-*-*-*-*-*-*-*-*-*-*-*-*-*-*-*-*-*-*-*-*-*-*-*-*-*-*-*-*-*-*-*-  LABORATORY DATA:  I have personally reviewed pertinent labs      Potassium   Date Value Ref Range Status   01/05/2020 4 1 3 5 - 5 3 mmol/L Final   12/13/2019 4 0 3 5 - 5 3 mmol/L Final   12/20/2018 4 2 3 5 - 5 3 mmol/L Final     Chloride   Date Value Ref Range Status   01/05/2020 105 100 - 108 mmol/L Final   12/13/2019 103 100 - 108 mmol/L Final   12/20/2018 107 100 - 108 mmol/L Final     CO2   Date Value Ref Range Status   01/05/2020 30 21 - 32 mmol/L Final   12/13/2019 33 (H) 21 - 32 mmol/L Final   12/20/2018 26 21 - 32 mmol/L Final     BUN   Date Value Ref Range Status   01/05/2020 18 5 - 25 mg/dL Final   12/13/2019 20 5 - 25 mg/dL Final   12/20/2018 11 5 - 25 mg/dL Final     Creatinine   Date Value Ref Range Status   01/05/2020 0 99 0 60 - 1 30 mg/dL Final     Comment:     Standardized to IDMS reference method   12/13/2019 1 06 0 60 - 1 30 mg/dL Final     Comment:     Standardized to IDMS reference method   12/20/2018 0 93 0 60 - 1 30 mg/dL Final     Comment:     Standardized to IDMS reference method     eGFR   Date Value Ref Range Status   01/05/2020 75 ml/min/1 73sq m Final   12/13/2019 69 ml/min/1 73sq m Final   12/20/2018 82 ml/min/1 73sq m Final     Calcium   Date Value Ref Range Status   01/05/2020 9 0 8 3 - 10 1 mg/dL Final   12/13/2019 9 4 8 3 - 10 1 mg/dL Final   12/20/2018 8 7 8 3 - 10 1 mg/dL Final     AST   Date Value Ref Range Status   12/13/2019 18 5 - 45 U/L Final     Comment:       Specimen collection should occur prior to Sulfasalazine administration due to the potential for falsely depressed results  12/20/2018 18 5 - 45 U/L Final     Comment:       Specimen collection should occur prior to Sulfasalazine administration due to the potential for falsely depressed results  ALT   Date Value Ref Range Status   12/13/2019 39 12 - 78 U/L Final     Comment:       Specimen collection should occur prior to Sulfasalazine and/or Sulfapyridine administration due to the potential for falsely depressed results  12/20/2018 25 12 - 78 U/L Final     Comment:       Specimen collection should occur prior to Sulfasalazine and/or Sulfapyridine administration due to the potential for falsely depressed results        Alkaline Phosphatase   Date Value Ref Range Status   12/13/2019 120 (H) 46 - 116 U/L Final   12/20/2018 84 46 - 116 U/L Final     WBC   Date Value Ref Range Status   01/05/2020 4 41 4 31 - 10 16 Thousand/uL Final   12/13/2019 12 16 (H) 4 31 - 10 16 Thousand/uL Final 2018 4 35 4 31 - 10 16 Thousand/uL Final     Hemoglobin   Date Value Ref Range Status   2020 15 6 12 0 - 17 0 g/dL Final   2019 16 1 12 0 - 17 0 g/dL Final   2018 15 4 12 0 - 17 0 g/dL Final     Platelets   Date Value Ref Range Status   2020 248 149 - 390 Thousands/uL Final   2019 396 (H) 149 - 390 Thousands/uL Final   2018 254 149 - 390 Thousands/uL Final     No results found for: PT, PTT, INR  No results found for: CKMB, BNP, DIGOXIN  No results found for: TSH  No results found for: CHOL, HDL, LDL, TRIG   No results found for: HGBA1C  No results found for: BLOODCX, SPUTUMCULTUR, GRAMSTAIN, URINECX, WOUNDCULT, BODYFLUIDCUL, MRSACULTURE, INFLUAPCR, INFLUBPCR, RSVPCR, LEGIONELLAUR, CDIFFTOXINB    *-*-*-*-*-*-*-*-*-*-*-*-*-*-*-*-*-*-*-*-*-*-*-*-*-*-*-*-*-*-*-*-*-*-*-*-*-*-*-*-*-*-*-*-*-*-*-*-*-*-*-*-*-*-  RADIOLOGY RESULTS:  No results found      *-*-*-*-*-*-*-*-*-*-*-*-*-*-*-*-*-*-*-*-*-*-*-*-*-*-*-*-*-*-*-*-*-*-*-*-*-*-*-*-*-*-*-*-*-*-*-*-*-*-*-*-*-*-  ECHOCARDIOGRAM AND OTHER CARDIOLOGY RESULTS:  Results for orders placed in visit on 18   Echo complete with contrast if indicated    Narrative [facility] [id number]  [facility] Chiquita Bahena [address] [place] [address], [id number] [phone]    Cardiovascular Report  _________________________________________________________________         Transthoracic Echocardiogram Report  Rafael Oliveira    MRN:                 [id number]  Account :           [de-identified]  Accession :         [id number]  Exam Date:           2018 11:00  Patient Location:    O  Ordering Phys:       Drake Waterman  Attending Phys:      Drake Waterman  Technologist:        Jcaqueline Soria    Age:  71             :   1945           Gender:   M  Wt:   170 lb         Ht:    69 in  Indication:  SOB  BP:         /       HR:    Rhythm:              sinus  Technical Quality:   good      MEASUREMENTS  2D ECHO  Body Surface Area                 1 9 m²  LV Diastolic Diameter PLAX        5 2 cm  LV Systolic Diameter PLAX         3 4 cm  IVS Diastolic Thickness           1 1 cm  LVPW Diastolic Thickness          1 1 cm  LV Relative Wall Thickness        0 4  LVOT Diameter                     2 0 cm  Aortic Root Diameter              3 3 cm  LA Systolic Diameter LX           3 3 cm  LA Area                           16 8 cm²  LV Ejection Fraction MOD 4C       65 3 %    DOPPLER  AV Peak Velocity                  149 9 cm/s  AV Peak Gradient                  9 0 mmHg  AV Mean Gradient                  4 3 mmHg  AV Velocity Time Integral         32 2 cm  LVOT Peak Velocity                88 3 cm/s  LVOT Peak Gradient                3 1 mmHg  LVOT Velocity Time Integral       19 3 cm  AV Area Cont Eq vti               1 9 cm²  AV Area Cont Eq pk                1 9 cm²  MV Area PHT                       2 4 cm²  Mitral E Point Velocity           66 4 cm/s  Mitral A Point Velocity           80 5 cm/s  Mitral E to A Ratio               0 8  MV E' Velocity                    7 8 cm/s  Mitral E to MV E' Ratio           8 5  TR Peak Velocity                  282 8 cm/s  TR Peak Gradient                  32 0 mmHg  Right Atrial Pressure             5 0 mmHg  Pulmonary Artery Systolic Pressu  25 2 mmHg  Right Ventricular Systolic Press  70 0 mmHg      FINDINGS  Left Ventricle  Normal left ventricle cavity size, normal wall thickness, normal  ventricular systolic function and wall motion  EF approximately  60-65%  Normal diastolic function  Right Ventricle  Normal right ventricle size and systolic function  Estimated  right ventricular systolic pressure is borderline increased, 37  mmHg  Possible mild pulmonary hypertension  Right Atrium  Normal right atrial size  Left Atrium  Normal left atrial size  Slightly aneurysmal interatrial septum  No color-flow Doppler evidence of patent foramen ovale or atrial  septal defect      Mitral Valve  Structurally normal appearing mitral valve leaflets  No mitral  valve stenosis or regurgitation  Aortic Valve  Trileaflet aortic valve  No aortic valve stenosis or  regurgitation  Tricuspid Valve  Trace to mild tricuspid valve regurgitation  Pulmonic Valve  No pulmonic regurgitation  Pericardium  No pericardial effusion  Aorta  Aortic root and proximal ascending aorta are normal in in size  on 2-D imaging  Additional Findings  Inferior vena cava is normal in size and demonstrates over 50%  collapse with respirations  CONCLUSIONS  - Normal left ventricle size and systolic and diastolic  function  EF 60-65%  - No significant chamber hypertrophy or enlargement  - Slightly aneurysmal interatrial septum without evidence of  interatrial shunts  - No significant aortic or mitral valve stenosis or  regurgitation   - Trace to mild tricuspid valve regurgitation   - Possible mild pulmonary hypertension  Estimated RVSP/PASP is  37 mmHg  - No pericardial effusion  Compared to previous echocardiogram from 7/16/2009 there is  overall no significant change  Mely Sanchez  (Electronically Signed)  Final Date:      02 May 2018 17:27  CV Report                   Radha Tapia      [id number]  Final                                                     Page 3     Negative exercise stress echocardiogram for ischemia in October 2017 at [facility]    Left ventricular function was reported as normal      *-*-*-*-*-*-*-*-*-*-*-*-*-*-*-*-*-*-*-*-*-*-*-*-*-*-*-*-*-*-*-*-*-*-*-*-*-*-*-*-*-*-*-*-*-*-*-*-*-*-*-*-*-*-  SIGNATURES:   @SIG@   Mely Sanchez MD     CC:   MD James Londono MD

## 2020-01-30 ENCOUNTER — APPOINTMENT (OUTPATIENT)
Dept: LAB | Age: 75
End: 2020-01-30
Payer: MEDICARE

## 2020-01-30 DIAGNOSIS — I48.91 ATRIAL FIBRILLATION, NEW ONSET (HCC): ICD-10-CM

## 2020-01-30 DIAGNOSIS — Z11.59 NEED FOR HEPATITIS C SCREENING TEST: ICD-10-CM

## 2020-01-30 DIAGNOSIS — N40.0 ENLARGED PROSTATE: ICD-10-CM

## 2020-01-30 DIAGNOSIS — Z12.11 SCREENING FOR COLON CANCER: ICD-10-CM

## 2020-01-30 DIAGNOSIS — I10 ESSENTIAL HYPERTENSION: ICD-10-CM

## 2020-01-30 DIAGNOSIS — M54.2 NECK PAIN: ICD-10-CM

## 2020-01-30 DIAGNOSIS — E78.49 OTHER HYPERLIPIDEMIA: ICD-10-CM

## 2020-01-30 DIAGNOSIS — Z00.01 ENCOUNTER FOR GENERAL ADULT MEDICAL EXAMINATION WITH ABNORMAL FINDINGS: ICD-10-CM

## 2020-01-30 DIAGNOSIS — I48.91 RAPID ATRIAL FIBRILLATION (HCC): ICD-10-CM

## 2020-01-30 DIAGNOSIS — M1A.9XX0 CHRONIC GOUT WITHOUT TOPHUS, UNSPECIFIED CAUSE, UNSPECIFIED SITE: ICD-10-CM

## 2020-01-30 DIAGNOSIS — C91.10 CHRONIC LYMPHOCYTIC LEUKEMIA (HCC): ICD-10-CM

## 2020-01-30 LAB
25(OH)D3 SERPL-MCNC: 21.2 NG/ML (ref 30–100)
ALBUMIN SERPL BCP-MCNC: 3.7 G/DL (ref 3.5–5)
ALP SERPL-CCNC: 96 U/L (ref 46–116)
ALT SERPL W P-5'-P-CCNC: 32 U/L (ref 12–78)
ANION GAP SERPL CALCULATED.3IONS-SCNC: 5 MMOL/L (ref 4–13)
AST SERPL W P-5'-P-CCNC: 22 U/L (ref 5–45)
BASOPHILS # BLD AUTO: 0.06 THOUSANDS/ΜL (ref 0–0.1)
BASOPHILS NFR BLD AUTO: 1 % (ref 0–1)
BILIRUB SERPL-MCNC: 0.85 MG/DL (ref 0.2–1)
BILIRUB UR QL STRIP: NEGATIVE
BUN SERPL-MCNC: 16 MG/DL (ref 5–25)
CALCIUM SERPL-MCNC: 9.5 MG/DL (ref 8.3–10.1)
CHLORIDE SERPL-SCNC: 106 MMOL/L (ref 100–108)
CHOLEST SERPL-MCNC: 204 MG/DL (ref 50–200)
CLARITY UR: CLEAR
CO2 SERPL-SCNC: 30 MMOL/L (ref 21–32)
COLOR UR: YELLOW
CREAT SERPL-MCNC: 1.02 MG/DL (ref 0.6–1.3)
DIGOXIN SERPL-MCNC: 0.9 NG/ML (ref 0.8–2)
EOSINOPHIL # BLD AUTO: 0.07 THOUSAND/ΜL (ref 0–0.61)
EOSINOPHIL NFR BLD AUTO: 1 % (ref 0–6)
ERYTHROCYTE [DISTWIDTH] IN BLOOD BY AUTOMATED COUNT: 14.6 % (ref 11.6–15.1)
GFR SERPL CREATININE-BSD FRML MDRD: 73 ML/MIN/1.73SQ M
GLUCOSE P FAST SERPL-MCNC: 98 MG/DL (ref 65–99)
GLUCOSE UR STRIP-MCNC: NEGATIVE MG/DL
HCT VFR BLD AUTO: 52.6 % (ref 36.5–49.3)
HCV AB SER QL: NORMAL
HDLC SERPL-MCNC: 68 MG/DL
HGB BLD-MCNC: 17.3 G/DL (ref 12–17)
HGB UR QL STRIP.AUTO: NEGATIVE
IMM GRANULOCYTES # BLD AUTO: 0.03 THOUSAND/UL (ref 0–0.2)
IMM GRANULOCYTES NFR BLD AUTO: 1 % (ref 0–2)
KETONES UR STRIP-MCNC: NEGATIVE MG/DL
LDLC SERPL CALC-MCNC: 122 MG/DL (ref 0–100)
LEUKOCYTE ESTERASE UR QL STRIP: NEGATIVE
LYMPHOCYTES # BLD AUTO: 0.49 THOUSANDS/ΜL (ref 0.6–4.47)
LYMPHOCYTES NFR BLD AUTO: 10 % (ref 14–44)
MAGNESIUM SERPL-MCNC: 2.4 MG/DL (ref 1.6–2.6)
MCH RBC QN AUTO: 29.9 PG (ref 26.8–34.3)
MCHC RBC AUTO-ENTMCNC: 32.9 G/DL (ref 31.4–37.4)
MCV RBC AUTO: 91 FL (ref 82–98)
MONOCYTES # BLD AUTO: 1.27 THOUSAND/ΜL (ref 0.17–1.22)
MONOCYTES NFR BLD AUTO: 26 % (ref 4–12)
NEUTROPHILS # BLD AUTO: 3.03 THOUSANDS/ΜL (ref 1.85–7.62)
NEUTS SEG NFR BLD AUTO: 61 % (ref 43–75)
NITRITE UR QL STRIP: NEGATIVE
NONHDLC SERPL-MCNC: 136 MG/DL
NRBC BLD AUTO-RTO: 0 /100 WBCS
PH UR STRIP.AUTO: 5.5 [PH]
PLATELET # BLD AUTO: 226 THOUSANDS/UL (ref 149–390)
PMV BLD AUTO: 11 FL (ref 8.9–12.7)
POTASSIUM SERPL-SCNC: 4.8 MMOL/L (ref 3.5–5.3)
PROT SERPL-MCNC: 7 G/DL (ref 6.4–8.2)
PROT UR STRIP-MCNC: NEGATIVE MG/DL
PSA SERPL-MCNC: 1.8 NG/ML (ref 0–4)
RBC # BLD AUTO: 5.79 MILLION/UL (ref 3.88–5.62)
SODIUM SERPL-SCNC: 141 MMOL/L (ref 136–145)
SP GR UR STRIP.AUTO: 1.02 (ref 1–1.03)
T4 FREE SERPL-MCNC: 1.05 NG/DL (ref 0.76–1.46)
TRIGL SERPL-MCNC: 70 MG/DL
TSH SERPL DL<=0.05 MIU/L-ACNC: 1.84 UIU/ML (ref 0.36–3.74)
URATE SERPL-MCNC: 7.9 MG/DL (ref 4.2–8)
UROBILINOGEN UR QL STRIP.AUTO: 0.2 E.U./DL
VIT B12 SERPL-MCNC: 783 PG/ML (ref 100–900)
WBC # BLD AUTO: 4.95 THOUSAND/UL (ref 4.31–10.16)

## 2020-01-30 PROCEDURE — 86803 HEPATITIS C AB TEST: CPT

## 2020-01-30 PROCEDURE — 84550 ASSAY OF BLOOD/URIC ACID: CPT

## 2020-01-30 PROCEDURE — 82306 VITAMIN D 25 HYDROXY: CPT

## 2020-01-30 PROCEDURE — 36415 COLL VENOUS BLD VENIPUNCTURE: CPT

## 2020-01-30 PROCEDURE — 80053 COMPREHEN METABOLIC PANEL: CPT

## 2020-01-30 PROCEDURE — 80162 ASSAY OF DIGOXIN TOTAL: CPT

## 2020-01-30 PROCEDURE — 80061 LIPID PANEL: CPT

## 2020-01-30 PROCEDURE — 86800 THYROGLOBULIN ANTIBODY: CPT

## 2020-01-30 PROCEDURE — 83735 ASSAY OF MAGNESIUM: CPT

## 2020-01-30 PROCEDURE — 84443 ASSAY THYROID STIM HORMONE: CPT

## 2020-01-30 PROCEDURE — 86376 MICROSOMAL ANTIBODY EACH: CPT

## 2020-01-30 PROCEDURE — 85025 COMPLETE CBC W/AUTO DIFF WBC: CPT

## 2020-01-30 PROCEDURE — 84439 ASSAY OF FREE THYROXINE: CPT

## 2020-01-30 PROCEDURE — 81003 URINALYSIS AUTO W/O SCOPE: CPT | Performed by: INTERNAL MEDICINE

## 2020-01-30 PROCEDURE — G0103 PSA SCREENING: HCPCS

## 2020-01-30 PROCEDURE — 82607 VITAMIN B-12: CPT

## 2020-01-31 ENCOUNTER — TELEPHONE (OUTPATIENT)
Dept: FAMILY MEDICINE CLINIC | Facility: CLINIC | Age: 75
End: 2020-01-31

## 2020-01-31 LAB
THYROGLOB AB SERPL-ACNC: <1 IU/ML (ref 0–0.9)
THYROPEROXIDASE AB SERPL-ACNC: 22 IU/ML (ref 0–34)

## 2020-02-06 ENCOUNTER — OFFICE VISIT (OUTPATIENT)
Dept: FAMILY MEDICINE CLINIC | Facility: CLINIC | Age: 75
End: 2020-02-06
Payer: MEDICARE

## 2020-02-06 ENCOUNTER — HOSPITAL ENCOUNTER (OUTPATIENT)
Dept: RADIOLOGY | Facility: HOSPITAL | Age: 75
Discharge: HOME | End: 2020-02-06
Payer: MEDICARE

## 2020-02-06 ENCOUNTER — HOSPITAL ENCOUNTER (OUTPATIENT)
Dept: CT IMAGING | Facility: HOSPITAL | Age: 75
Discharge: HOME | End: 2020-02-06
Payer: MEDICARE

## 2020-02-06 VITALS
DIASTOLIC BLOOD PRESSURE: 70 MMHG | SYSTOLIC BLOOD PRESSURE: 120 MMHG | HEIGHT: 66 IN | TEMPERATURE: 98.2 F | WEIGHT: 163 LBS | HEART RATE: 76 BPM | BODY MASS INDEX: 26.2 KG/M2 | OXYGEN SATURATION: 98 % | RESPIRATION RATE: 14 BRPM

## 2020-02-06 DIAGNOSIS — R05.9 COUGH: ICD-10-CM

## 2020-02-06 DIAGNOSIS — I48.91 RAPID ATRIAL FIBRILLATION (HCC): ICD-10-CM

## 2020-02-06 DIAGNOSIS — M25.412 PAIN AND SWELLING OF LEFT SHOULDER: ICD-10-CM

## 2020-02-06 DIAGNOSIS — G89.29 CHRONIC LEFT SHOULDER PAIN: ICD-10-CM

## 2020-02-06 DIAGNOSIS — R06.02 SOB (SHORTNESS OF BREATH): ICD-10-CM

## 2020-02-06 DIAGNOSIS — C91.10 CHRONIC LYMPHOCYTIC LEUKEMIA (HCC): Primary | ICD-10-CM

## 2020-02-06 DIAGNOSIS — I48.91 ATRIAL FIBRILLATION, NEW ONSET (HCC): ICD-10-CM

## 2020-02-06 DIAGNOSIS — R42 DIZZINESS: ICD-10-CM

## 2020-02-06 DIAGNOSIS — M25.512 CHRONIC LEFT SHOULDER PAIN: ICD-10-CM

## 2020-02-06 DIAGNOSIS — M25.512 PAIN AND SWELLING OF LEFT SHOULDER: ICD-10-CM

## 2020-02-06 LAB — NON VENT ROOM AIR: 400 %

## 2020-02-06 PROCEDURE — 1160F RVW MEDS BY RX/DR IN RCRD: CPT | Performed by: INTERNAL MEDICINE

## 2020-02-06 PROCEDURE — 1036F TOBACCO NON-USER: CPT | Performed by: INTERNAL MEDICINE

## 2020-02-06 PROCEDURE — 71250 CT THORAX DX C-: CPT

## 2020-02-06 PROCEDURE — 3078F DIAST BP <80 MM HG: CPT | Performed by: INTERNAL MEDICINE

## 2020-02-06 PROCEDURE — 3074F SYST BP LT 130 MM HG: CPT | Performed by: INTERNAL MEDICINE

## 2020-02-06 PROCEDURE — 3008F BODY MASS INDEX DOCD: CPT | Performed by: INTERNAL MEDICINE

## 2020-02-06 PROCEDURE — 4040F PNEUMOC VAC/ADMIN/RCVD: CPT | Performed by: INTERNAL MEDICINE

## 2020-02-06 PROCEDURE — 99214 OFFICE O/P EST MOD 30 MIN: CPT | Performed by: INTERNAL MEDICINE

## 2020-02-06 PROCEDURE — 73030 X-RAY EXAM OF SHOULDER: CPT

## 2020-02-06 RX ORDER — BENZONATATE 100 MG/1
100 CAPSULE ORAL 3 TIMES DAILY PRN
Qty: 30 CAPSULE | Refills: 1 | Status: SHIPPED | OUTPATIENT
Start: 2020-02-06 | End: 2020-02-27

## 2020-02-06 RX ORDER — LEVOFLOXACIN 500 MG/1
500 TABLET, FILM COATED ORAL EVERY 24 HOURS
Qty: 10 TABLET | Refills: 0 | Status: SHIPPED | OUTPATIENT
Start: 2020-02-06 | End: 2020-02-11

## 2020-02-06 RX ORDER — PROMETHAZINE HYDROCHLORIDE AND CODEINE PHOSPHATE 6.25; 1 MG/5ML; MG/5ML
5 SYRUP ORAL
Qty: 120 ML | Refills: 0 | Status: SHIPPED | OUTPATIENT
Start: 2020-02-06 | End: 2020-02-27

## 2020-02-06 RX ORDER — DIGOXIN 125 MCG
125 TABLET ORAL DAILY
Qty: 30 TABLET | Refills: 3 | Status: SHIPPED | OUTPATIENT
Start: 2020-02-06 | End: 2020-06-12 | Stop reason: SDUPTHER

## 2020-02-11 DIAGNOSIS — R05.9 COUGH: Primary | ICD-10-CM

## 2020-02-11 RX ORDER — AZITHROMYCIN 250 MG/1
TABLET, FILM COATED ORAL
Qty: 6 TABLET | Refills: 0 | Status: SHIPPED | OUTPATIENT
Start: 2020-02-11 | End: 2020-02-16

## 2020-02-11 NOTE — PROGRESS NOTES
Assessment/Plan:         Diagnoses and all orders for this visit:    Chronic lymphocytic leukemia ([address] ); FU w Hematology  Continue same  Add:  -     b complex-C-E-zinc (STRESS FORMULA/ZINC) tablet; Take 1 tablet by mouth daily    Rapid atrial fibrillation ([address] ); Reduce :  -     digoxin (LANOXIN)  TO 0 125 mg tablet; Take 1 tablet (125 mcg total) by mouth daily Please stop 0 25 mg  Stop Losartan  FU w cardiology  RTC in 2 weeks  Cough;   -     POCT peak flow  -     CT chest wo contrast; Future  -     levofloxacin (LEVAQUIN) 500 mg tablet; Take 1 tablet (500 mg total) by mouth every 24 hours for 10 days  -     promethazine-codeine (PHENERGAN WITH CODEINE) 6 25-10 mg/5 mL syrup; Take 5 mL by mouth daily at bedtime  -     benzonatate (TESSALON PERLES) 100 mg capsule; Take 1 capsule (100 mg total) by mouth 3 (three) times a day as needed for cough With food/meals  RTC in 2 weeks  Consider Pulmonary Consult  PFTs         SOB (shortness of breath); as above     -     POCT peak flow  -     CT chest wo contrast; Future  -     levofloxacin (LEVAQUIN) 500 mg tablet; Take 1 tablet (500 mg total) by mouth every 24 hours for 10 days  -     promethazine-codeine (PHENERGAN WITH CODEINE) 6 25-10 mg/5 mL syrup; Take 5 mL by mouth daily at bedtime  -     benzonatate (TESSALON PERLES) 100 mg capsule; Take 1 capsule (100 mg total) by mouth 3 (three) times a day as needed for cough With food/meals  -     Ambulatory referral to Orthopedic Surgery; Future    Dizziness; reduce Digoxin to 0 125 mg   And stop Losartan  FU w cardiology  RTC in 2 weeks    Atrial fibrillation, new onset ([facility] [address] ); as above    Chronic left shoulder pain; with abnormal X ray :  -     Complete PFT with post bronchodilator; Future        Subjective:      Patient ID: Adelaida Her is a 69 y o  male      69 Y O man is here for Regular check up, also since he started On losartan and Digoxin he has been feeling more Tired, Fatigue, No energy, also he started Cough,wheezing,sob again lately    The following portions of the patient's history were reviewed and updated as appropriate: allergies, current medications, past family history, past medical history, past social history, past surgical history and problem list     Review of Systems   Constitutional: Positive for fatigue  Negative for chills and fever  HENT: Positive for postnasal drip  Negative for congestion, facial swelling, sore throat, trouble swallowing and voice change  Eyes: Negative for pain, discharge and visual disturbance  Respiratory: Positive for cough and wheezing  Negative for shortness of breath  Cardiovascular: Negative for chest pain, palpitations and leg swelling  Gastrointestinal: Negative for abdominal pain, blood in stool, constipation, diarrhea and nausea  Endocrine: Negative for polydipsia, polyphagia and polyuria  Genitourinary: Negative for difficulty urinating, hematuria and urgency  Musculoskeletal: Positive for arthralgias and neck pain  Negative for myalgias  Skin: Negative for rash  Neurological: Positive for dizziness  Negative for tremors, weakness and headaches  Hematological: Negative for adenopathy  Does not bruise/bleed easily  Psychiatric/Behavioral: Negative for dysphoric mood, sleep disturbance and suicidal ideas  Objective:      /70 (BP Location: Right arm, Patient Position: Standing, Cuff Size: Standard)   Pulse 76   Temp 98 2 °F (36 8 °C)   Resp 14   Ht 5' 6" (1 676 m)   Wt 73 9 kg (163 lb)   SpO2 98%   BMI 26 31 kg/m²          Physical Exam   Constitutional: He is oriented to person, place, and time  He appears well-nourished  No distress  HENT:   Head: Normocephalic  Mouth/Throat: Oropharynx is clear and moist  No oropharyngeal exudate  Eyes: Pupils are equal, round, and reactive to light  Conjunctivae are normal  No scleral icterus  Neck: Neck supple  No thyromegaly present  Cardiovascular: Normal rate  Murmur heard  irregular   Pulmonary/Chest: Effort normal  No respiratory distress  He has wheezes  He has no rales  Abdominal: Soft  Bowel sounds are normal  He exhibits no distension  There is no tenderness  There is no rebound and no guarding  Musculoskeletal: He exhibits tenderness  He exhibits no edema  Lymphadenopathy:     He has no cervical adenopathy  Neurological: He is alert and oriented to person, place, and time  No cranial nerve deficit  Psychiatric: He has a normal mood and affect  O-Z Plasty Text: The defect edges were debeveled with a #15 scalpel blade.  Given the location of the defect, shape of the defect and the proximity to free margins an O-Z plasty (double transposition flap) was deemed most appropriate.  Using a sterile surgical marker, the appropriate transposition flaps were drawn incorporating the defect and placing the expected incisions within the relaxed skin tension lines where possible.    The area thus outlined was incised deep to adipose tissue with a #15 scalpel blade.  The skin margins were undermined to an appropriate distance in all directions utilizing iris scissors.  Hemostasis was achieved with electrocautery.  The flaps were then transposed into place, one clockwise and the other counterclockwise, and anchored with interrupted buried subcutaneous sutures.

## 2020-02-27 ENCOUNTER — OFFICE VISIT (OUTPATIENT)
Dept: FAMILY MEDICINE CLINIC | Facility: CLINIC | Age: 75
End: 2020-02-27
Payer: MEDICARE

## 2020-02-27 VITALS
BODY MASS INDEX: 25.58 KG/M2 | RESPIRATION RATE: 14 BRPM | HEART RATE: 84 BPM | SYSTOLIC BLOOD PRESSURE: 140 MMHG | OXYGEN SATURATION: 98 % | DIASTOLIC BLOOD PRESSURE: 86 MMHG | WEIGHT: 159.2 LBS | HEIGHT: 66 IN | TEMPERATURE: 97.9 F

## 2020-02-27 DIAGNOSIS — C91.10 CHRONIC LYMPHOCYTIC LEUKEMIA (HCC): ICD-10-CM

## 2020-02-27 DIAGNOSIS — I10 ESSENTIAL HYPERTENSION, MALIGNANT: ICD-10-CM

## 2020-02-27 DIAGNOSIS — I10 ESSENTIAL HYPERTENSION: Primary | ICD-10-CM

## 2020-02-27 DIAGNOSIS — R05.9 COUGH: ICD-10-CM

## 2020-02-27 DIAGNOSIS — I48.91 RAPID ATRIAL FIBRILLATION (HCC): ICD-10-CM

## 2020-02-27 DIAGNOSIS — E88.09 PROTEINS SERUM PLASMA LOW: ICD-10-CM

## 2020-02-27 PROBLEM — R07.9 CHEST PAIN: Status: ACTIVE | Noted: 2017-10-15

## 2020-02-27 PROBLEM — R35.0 INCREASED FREQUENCY OF URINATION: Status: ACTIVE | Noted: 2018-12-14

## 2020-02-27 PROCEDURE — 4040F PNEUMOC VAC/ADMIN/RCVD: CPT | Performed by: INTERNAL MEDICINE

## 2020-02-27 PROCEDURE — 3077F SYST BP >= 140 MM HG: CPT | Performed by: INTERNAL MEDICINE

## 2020-02-27 PROCEDURE — 1036F TOBACCO NON-USER: CPT | Performed by: INTERNAL MEDICINE

## 2020-02-27 PROCEDURE — 99214 OFFICE O/P EST MOD 30 MIN: CPT | Performed by: INTERNAL MEDICINE

## 2020-02-27 PROCEDURE — 3079F DIAST BP 80-89 MM HG: CPT | Performed by: INTERNAL MEDICINE

## 2020-02-27 PROCEDURE — 1160F RVW MEDS BY RX/DR IN RCRD: CPT | Performed by: INTERNAL MEDICINE

## 2020-02-27 PROCEDURE — 3008F BODY MASS INDEX DOCD: CPT | Performed by: INTERNAL MEDICINE

## 2020-02-27 NOTE — PROGRESS NOTES
Assessment/Plan:         Diagnoses and all orders for this visit:    Essential hypertension; : stable  Continue Bystolic 10 mg  RTC in 3mos w Blood work    Chronic lymphocytic leukemia ([facility] [address] ); continue Ibrutinib  FU w Hematology    Rapid atrial fibrillation ([facility] [address] ); stable  Continue Digoxin and Eliquis  FU w cardiology  RTC in 3mosw  :  -     Digoxin level; Future  -     Comprehensive metabolic panel; Future  -     CBC and differential; Future  -     Lipid panel; Future  -     TSH, 3rd generation; Future  -     UA (URINE) with reflex to Scope  -     Digoxin level; Future    Proteins serum plasma low; Increase Po Protei Supplements  RTC in 3mos w :  -     Comprehensive metabolic panel; Future    Cough; Improving Nicely  Continue Breo 200 mcg Daily  RTC in 1-2 mos         Subjective:      Patient ID: Delon Plata is a 69 y o  male  69 Y O man is here for Regular check up, and Re Check from last visit, He feels better, less Cough, No New symptoms, Med List reviewed w pt in Detail    The following portions of the patient's history were reviewed and updated as appropriate: allergies, current medications, past family history, past medical history, past social history, past surgical history and problem list     Review of Systems   Constitutional: Negative for chills, fatigue and fever  HENT: Negative for congestion, facial swelling, sore throat, trouble swallowing and voice change  Eyes: Negative for pain, discharge and visual disturbance  Respiratory: Negative for cough, shortness of breath and wheezing  Cardiovascular: Negative for chest pain, palpitations and leg swelling  Gastrointestinal: Negative for abdominal pain, blood in stool, constipation, diarrhea and nausea  Endocrine: Negative for polydipsia, polyphagia and polyuria  Genitourinary: Negative for difficulty urinating, hematuria and urgency  Musculoskeletal: Negative for arthralgias and myalgias  Skin: Negative for rash  Neurological: Negative for dizziness, tremors, weakness and headaches  Hematological: Negative for adenopathy  Does not bruise/bleed easily  Psychiatric/Behavioral: Negative for dysphoric mood, sleep disturbance and suicidal ideas  Objective:      /86 (BP Location: Left arm, Patient Position: Sitting, Cuff Size: Standard)   Pulse 84   Temp 97 9 °F (36 6 °C) (Probe)   Resp 14   Ht 5' 6" (1 676 m)   Wt 72 2 kg (159 lb 3 2 oz)   SpO2 98%   BMI 25 70 kg/m²          Physical Exam   Constitutional: He is oriented to person, place, and time  He appears well-nourished  No distress  HENT:   Head: Normocephalic  Mouth/Throat: Oropharynx is clear and moist  No oropharyngeal exudate  Eyes: Pupils are equal, round, and reactive to light  Conjunctivae are normal  No scleral icterus  Neck: Neck supple  No thyromegaly present  Cardiovascular: Normal rate and regular rhythm  Murmur heard  Pulmonary/Chest: Effort normal and breath sounds normal  No respiratory distress  He has no wheezes  He has no rales  Abdominal: Soft  Bowel sounds are normal  He exhibits no distension  There is no tenderness  There is no rebound and no guarding  Musculoskeletal: He exhibits no edema or tenderness  Lymphadenopathy:     He has no cervical adenopathy  Neurological: He is alert and oriented to person, place, and time  No cranial nerve deficit  Coordination normal    Skin: No erythema  No pallor  Psychiatric: He has a normal mood and affect

## 2020-03-03 ENCOUNTER — OFFICE VISIT (OUTPATIENT)
Dept: CARDIOLOGY CLINIC | Facility: CLINIC | Age: 75
End: 2020-03-03
Payer: MEDICARE

## 2020-03-03 ENCOUNTER — HOSPITAL ENCOUNTER (OUTPATIENT)
Dept: NON INVASIVE DIAGNOSTICS | Facility: HOSPITAL | Age: 75
Discharge: HOME | End: 2020-03-03
Payer: MEDICARE

## 2020-03-03 VITALS
WEIGHT: 160 LBS | BODY MASS INDEX: 25.71 KG/M2 | SYSTOLIC BLOOD PRESSURE: 130 MMHG | DIASTOLIC BLOOD PRESSURE: 62 MMHG | HEART RATE: 78 BPM | HEIGHT: 66 IN

## 2020-03-03 DIAGNOSIS — I34.0 MODERATE MITRAL VALVE REGURGITATION: ICD-10-CM

## 2020-03-03 DIAGNOSIS — I48.91 ATRIAL FIBRILLATION, NEW ONSET (HCC): Primary | ICD-10-CM

## 2020-03-03 DIAGNOSIS — I48.91 RAPID ATRIAL FIBRILLATION (HCC): ICD-10-CM

## 2020-03-03 DIAGNOSIS — I42.8 NONISCHEMIC CARDIOMYOPATHY (HCC): ICD-10-CM

## 2020-03-03 DIAGNOSIS — I10 BENIGN ESSENTIAL HTN: ICD-10-CM

## 2020-03-03 DIAGNOSIS — I27.20 MILD PULMONARY HYPERTENSION (HCC): ICD-10-CM

## 2020-03-03 PROCEDURE — 1036F TOBACCO NON-USER: CPT | Performed by: INTERNAL MEDICINE

## 2020-03-03 PROCEDURE — 99214 OFFICE O/P EST MOD 30 MIN: CPT | Performed by: INTERNAL MEDICINE

## 2020-03-03 PROCEDURE — 93306 TTE W/DOPPLER COMPLETE: CPT

## 2020-03-03 PROCEDURE — 93306 TTE W/DOPPLER COMPLETE: CPT | Performed by: INTERNAL MEDICINE

## 2020-03-03 PROCEDURE — 3075F SYST BP GE 130 - 139MM HG: CPT | Performed by: INTERNAL MEDICINE

## 2020-03-03 PROCEDURE — 3008F BODY MASS INDEX DOCD: CPT | Performed by: INTERNAL MEDICINE

## 2020-03-03 PROCEDURE — 3078F DIAST BP <80 MM HG: CPT | Performed by: INTERNAL MEDICINE

## 2020-03-03 PROCEDURE — 1160F RVW MEDS BY RX/DR IN RCRD: CPT | Performed by: INTERNAL MEDICINE

## 2020-03-03 PROCEDURE — 4040F PNEUMOC VAC/ADMIN/RCVD: CPT | Performed by: INTERNAL MEDICINE

## 2020-03-03 NOTE — PATIENT INSTRUCTIONS
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     - at this time a transesophageal echocardiogram and cardioversion are reasonable  I have discussed these procedures with him including its benefits and risks and alternatives and he is agreeable  The FRANK will allow was to further define his mitral valve anatomy   - I am reinforcing with him the importance of taking anticoagulation consistently  - he is also advised to continue to monitor himself for signs and symptoms of heart failure  The following routine measures are being advised to prevent exacerbation of congestive heart failure:     - Daily or atleast weekly checking of weight  Notify your clinicians if greater than 2 lb is gained in 1 day or greater than 5 lb is gained in 1 wk  - Checking for lower extremity swelling  Examine legs for swelling (new or increase in existing swelling) and describe if swelling reaches ankle, shin, or knee  - Monitoring for decrease in exercise tolerance      Check your self for unusual shortness of breath at rest, or with mild exertion, moderate exertion, or none at all     - Monitoring for worsening shortness of breath on lying down  Check for increase in number of pillows used at night and for increase in waking at night with shortness of breath  - Salt/sodium restriction to less than 2 g a day  Calculate total sodium intake in a day from nutrition labels and food charts  Understand hidden sources of salt intake  You are advised to inform us for any concerns regarding above measures

## 2020-03-03 NOTE — PROGRESS NOTES
[facility]  [address] [facility] [facility], [place] [address]  Phone#  [phone]  Fax#  [phone]  *-*-*-*-*-*-*-*-*-*-*-*-*-*-*-*-*-*-*-*-*-*-*-*-*-*-*-*-*-*-*-*-*-*-*-*-*-*-*-*-*-*-*-*-*-*-*-*-*-*-*-*-*-*  Rosalio Listen DATE: 03/03/20 4:32 PM  PATIENT NAME: Hua Fox   1945    [id number]  Age: 69 y o  Sex: male  AUTHOR: Mely Sanchez MD  PRIMARYCARE PHYSICIAN: Pedro Luis Amin MD    DIAGNOSES:  1  Recently diagnosed atrial fibrillation (February 2020)  2  Chronic lymphocytic leukemia initially diagnosed in 2013, now with relapse, currently on ibrutinib and rituximab  3  Intracranial meningioma along the undersurface of right cerebellar tentorium diagnosed in 2017  4  Degenerative joint disease with arthritis  5  Chronic gout  6  Benign prostatic hypertrophy  7  Essential hypertension  8  Cardiomyopathy, unspecified, heart failure with mid range ejection fraction, EF 46 percent    Echocardiogram March 3, 2020, mildly reduced left ventricular systolic function with global hypokinesis, EF of 46 percent, mild left ventricular cavity enlargement, indeterminate diastolic dysfunction, mild left atrial cavity enlargement, moderate mitral valve regurgitation, mild mitral annular calcification, mild to moderate tricuspid valve and trace pulmonic valve regurgitation, mild pulmonary hypertension with estimated RVSP/PASP 42 mmHg, no pericardial effusion  CURRENT ECG:  No results found for this visit on 03/03/20  CARDIOLOGY ASSESSMENT & PLAN:  1  Atrial fibrillation, new onset ([address] )  CARDIAC CARDIOVERSION (CATH LAB / OR ONLY)    3D FRANK   2  Mild pulmonary hypertension ([address] )     3  Benign essential HTN     4  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  CARDIAC CARDIOVERSION (CATH LAB / OR ONLY)    3D FRANK   5   Moderate mitral valve regurgitation  CARDIAC CARDIOVERSION (CATH LAB / OR ONLY)    3D FRANK     Atrial fibrillation, new onset [facility])  Mr Facundo Koenig has recent diagnosis of atrial fibrillation  Heart rate is currently well controlled  He also has systolic and diastolic dysfunction, mild pulmonary hypertension and moderate mitral valve regurgitation  He gives no history suggestive of obstructive sleep apnea  He has been compliant with medications except for Eliquis which he missed a few days  LV dysfunction is likely due to tachycardia related cardiomyopathy relating to his AFib and mitral regurgitation     - at this time a transesophageal echocardiogram and cardioversion are reasonable  I have discussed these procedures with him including its benefits and risks and alternatives and he is agreeable  The FRANK will allow was to further define his mitral valve anatomy   - I am reinforcing with him the importance of taking anticoagulation consistently  - he is also advised to continue to monitor himself for signs and symptoms of heart failure  The following routine measures are being advised to prevent exacerbation of congestive heart failure:     - Daily or atleast weekly checking of weight  Notify your clinicians if greater than 2 lb is gained in 1 day or greater than 5 lb is gained in 1 wk  - Checking for lower extremity swelling  Examine legs for swelling (new or increase in existing swelling) and describe if swelling reaches ankle, shin, or knee  - Monitoring for decrease in exercise tolerance  Check your self for unusual shortness of breath at rest, or with mild exertion, moderate exertion, or none at all     - Monitoring for worsening shortness of breath on lying down  Check for increase in number of pillows used at night and for increase in waking at night with shortness of breath  - Salt/sodium restriction to less than 2 g a day  Calculate total sodium intake in a day from nutrition labels and food charts  Understand hidden sources of salt intake  You are advised to inform us for any concerns regarding above measures              INTERVAL HISTORY & HISTORY OF PRESENT ILLNESS:  Cayetano Solo is here for follow-up regarding his cardiac comorbidities which include:  Recently diagnosed atrial fibrillation  He was seen previously on 28th of January and we had referred him to undergo blood work to rule out thyroid disease as well as an echocardiogram to assess cardiac structure and function  Today he reports that he overall feels well with no recent subjective symptoms  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  He has had no recent decline in his exercise tolerance  Reports that overall he has been compliant with medications except that he ran out of [employer] about 2-3 days back and has not taken it  Denies any bleeding or excessive bruising  Functional capacity status:  Good   (Excellent- >10 METs; Good: (7-10 METs); Moderate (4-7 METs); Poor (<= 4 METs)    Any chronic stressors:  None   (feeling of poor health, financial problems, and social isolation etc)  Tobacco or alcohol dependence:  None      REVIEW OF SYMPTOMS:    Positive for:  No significant symptoms  Negative for: All remaining as reviewed below and in HPI      SYSTEM SYMPTOMS REVIEWED:  General--weight change, fever, night sweats  Respiratory--cough, wheezing, shortness of breath, sputum production  Cardiovascular--chest pain, syncope, dyspnea on exertion, edema, decline in exercise tolerance, claudication   Gastrointestinal--persistent vomiting, diarrhea, abdominal distention, blood in stool   Muscular or skeletal--joint pain or swelling   Neurologic--headaches, syncope, abnormal movement  Hematologic--history of easy bruising and bleeding   Endocrine--thyroid enlargement, heat or cold intolerance, polyuria   Psychiatric--anxiety, depression     *-*-*-*-*-*-*-*-*-*-*-*-*-*-*-*-*-*-*-*-*-*-*-*-*-*-*-*-*-*-*-*-*-*-*-*-*-*-*-*-*-*-*-*-*-*-*-*-*-*-*-*-*-*-  VITAL SIGNS:  Vitals:    03/03/20 1531   BP: 130/62   Pulse: 78   Weight: 72 6 kg (160 lb)   Height: 5' 6" (1 676 m) Weight (last 2 days)     Date/Time   Weight    03/03/20 1531   72 6 (160)           ,   Wt Readings from Last 3 Encounters:   03/03/20 72 6 kg (160 lb)   02/27/20 72 2 kg (159 lb 3 2 oz)   02/06/20 73 9 kg (163 lb)    , Body mass index is 25 82 kg/m²  *-*-*-*-*-*-*-*-*-*-*-*-*-*-*-*-*-*-*-*-*-*-*-*-*-*-*-*-*-*-*-*-*-*-*-*-*-*-*-*-*-*-*-*-*-*-*-*-*-*-*-*-*-*-  PHYSICAL EXAM:  General Appearance:    Alert, cooperative, no distress, appears stated age   Head, Eyes, ENT:    No obvious abnormality, moist mucous mebranes  Neck:   Supple, no carotid bruit or JVD   Back:     Symmetric, no curvature  Lungs:     Respirations unlabored  Clear to auscultation bilaterally,    Chest wall:    No tenderness or deformity   Heart:     irregularly irregular rhythm, S1 and S2 normal, no murmur, rub  or gallop     Abdomen:     Soft, non-tender, No obvious masses, or organomegaly   Extremities:   Extremities normal, no cyanosis or edema    Skin:   Skin color, texture, turgor normal, no rashes or lesions     *-*-*-*-*-*-*-*-*-*-*-*-*-*-*-*-*-*-*-*-*-*-*-*-*-*-*-*-*-*-*-*-*-*-*-*-*-*-*-*-*-*-*-*-*-*-*-*-*-*-*-*-*-*-  CURRENT MEDICATION LIST:    Current Outpatient Medications:     apixaban (ELIQUIS) 5 mg, Take 1 tablet (5 mg total) by mouth 2 (two) times a day, Disp: 180 tablet, Rfl: 3    b complex-C-E-zinc (STRESS FORMULA/ZINC) tablet, Take 1 tablet by mouth daily, Disp: 90 tablet, Rfl: 3    Diclofenac Sodium (VOLTAREN PO), Take by mouth, Disp: , Rfl:     digoxin (LANOXIN) 0 125 mg tablet, Take 1 tablet (125 mcg total) by mouth daily Please stop 0 25 mg, Disp: 30 tablet, Rfl: 3    Ibrutinib (IMBRUVICA) 140 MG TABS, Take 140 mg by mouth 2 (two) times a day, Disp: 180 tablet, Rfl: 3    nebivolol (BYSTOLIC) 10 mg tablet, Take 1 tablet (10 mg total) by mouth daily, Disp: 90 tablet, Rfl: 3    tamsulosin (FLOMAX) 0 4 mg, Take 1 capsule (0 4 mg total) by mouth daily with dinner, Disp: 90 capsule, Rfl: 3    ALLERGIES:  Allergies Allergen Reactions    Tetanus Antitoxin Rash     Patient told by mother medication was given as a child  Patient told by mother medication was given as a child    Tetanus Toxoid     Allopurinol Rash       *-*-*-*-*-*-*-*-*-*-*-*-*-*-*-*-*-*-*-*-*-*-*-*-*-*-*-*-*-*-*-*-*-*-*-*-*-*-*-*-*-*-*-*-*-*-*-*-*-*-*-*-*-*-  The LABORATORY DATA:  I have personally reviewed pertinent labs  Recent blood work is reviewed  Renal function and electrolytes were normal thyroid function was normal, lipid profiles was slightly abnormal   CBC is abnormal   Digoxin level was close to upper limit of acceptable level at 0 9            Potassium   Date Value Ref Range Status   01/30/2020 4 8 3 5 - 5 3 mmol/L Final   01/05/2020 4 1 3 5 - 5 3 mmol/L Final   12/13/2019 4 0 3 5 - 5 3 mmol/L Final     Chloride   Date Value Ref Range Status   01/30/2020 106 100 - 108 mmol/L Final   01/05/2020 105 100 - 108 mmol/L Final   12/13/2019 103 100 - 108 mmol/L Final     CO2   Date Value Ref Range Status   01/30/2020 30 21 - 32 mmol/L Final   01/05/2020 30 21 - 32 mmol/L Final   12/13/2019 33 (H) 21 - 32 mmol/L Final     BUN   Date Value Ref Range Status   01/30/2020 16 5 - 25 mg/dL Final   01/05/2020 18 5 - 25 mg/dL Final   12/13/2019 20 5 - 25 mg/dL Final     Creatinine   Date Value Ref Range Status   01/30/2020 1 02 0 60 - 1 30 mg/dL Final     Comment:     Standardized to IDMS reference method   01/05/2020 0 99 0 60 - 1 30 mg/dL Final     Comment:     Standardized to IDMS reference method   12/13/2019 1 06 0 60 - 1 30 mg/dL Final     Comment:     Standardized to IDMS reference method     eGFR   Date Value Ref Range Status   01/30/2020 73 ml/min/1 73sq m Final   01/05/2020 75 ml/min/1 73sq m Final   12/13/2019 69 ml/min/1 73sq m Final     Calcium   Date Value Ref Range Status   01/30/2020 9 5 8 3 - 10 1 mg/dL Final   01/05/2020 9 0 8 3 - 10 1 mg/dL Final   12/13/2019 9 4 8 3 - 10 1 mg/dL Final     AST   Date Value Ref Range Status   01/30/2020 22 5 - 45 U/L Final     Comment:       Specimen collection should occur prior to Sulfasalazine administration due to the potential for falsely depressed results  12/13/2019 18 5 - 45 U/L Final     Comment:       Specimen collection should occur prior to Sulfasalazine administration due to the potential for falsely depressed results  12/20/2018 18 5 - 45 U/L Final     Comment:       Specimen collection should occur prior to Sulfasalazine administration due to the potential for falsely depressed results  ALT   Date Value Ref Range Status   01/30/2020 32 12 - 78 U/L Final     Comment:       Specimen collection should occur prior to Sulfasalazine and/or Sulfapyridine administration due to the potential for falsely depressed results  12/13/2019 39 12 - 78 U/L Final     Comment:       Specimen collection should occur prior to Sulfasalazine and/or Sulfapyridine administration due to the potential for falsely depressed results  12/20/2018 25 12 - 78 U/L Final     Comment:       Specimen collection should occur prior to Sulfasalazine and/or Sulfapyridine administration due to the potential for falsely depressed results        Alkaline Phosphatase   Date Value Ref Range Status   01/30/2020 96 46 - 116 U/L Final   12/13/2019 120 (H) 46 - 116 U/L Final   12/20/2018 84 46 - 116 U/L Final     Magnesium   Date Value Ref Range Status   01/30/2020 2 4 1 6 - 2 6 mg/dL Final     WBC   Date Value Ref Range Status   01/30/2020 4 95 4 31 - 10 16 Thousand/uL Final   01/05/2020 4 41 4 31 - 10 16 Thousand/uL Final   12/13/2019 12 16 (H) 4 31 - 10 16 Thousand/uL Final     Hemoglobin   Date Value Ref Range Status   01/30/2020 17 3 (H) 12 0 - 17 0 g/dL Final   01/05/2020 15 6 12 0 - 17 0 g/dL Final   12/13/2019 16 1 12 0 - 17 0 g/dL Final     Platelets   Date Value Ref Range Status   01/30/2020 226 149 - 390 Thousands/uL Final   01/05/2020 248 149 - 390 Thousands/uL Final   12/13/2019 396 (H) 149 - 390 Thousands/uL Final     No results found for: PT, PTT, INR  Digoxin Lvl   Date Value Ref Range Status   2020 0 9 0 8 - 2 0 ng/mL Final     No results found for: TSH  HDL, Direct   Date Value Ref Range Status   2020 68 >=40 mg/dL Final     Comment:       HDL Cholesterol:       Low     <41 mg/dL  Specimen collection should occur prior to Metamizole administration due to the potential for falsley depressed results  Triglycerides   Date Value Ref Range Status   2020 70 <=150 mg/dL Final     Comment:       Triglyceride:     Normal          <150 mg/dl     Borderline High 150-199 mg/dl     High            200-499 mg/dl        Very High       >499 mg/dl    Specimen collection should occur prior to N-Acetylcysteine or Metamizole administration due to the potential for falsely depressed results        No results found for: HGBA1C  No results found for: Orene Hylton, GRAMSTAIN, URINECX, WOUNDCULT, BODYFLUIDCUL, MRSACULTURE, INFLUAPCR, INFLUBPCR, RSVPCR, LEGIONELLAUR, CDIFFTOXINB    *-*-*-*-*-*-*-*-*-*-*-*-*-*-*-*-*-*-*-*-*-*-*-*-*-*-*-*-*-*-*-*-*-*-*-*-*-*-*-*-*-*-*-*-*-*-*-*-*-*-*-*-*-*-  PREVIOUS CARDIOLOGY & RADIOLOGY RESULTS:  Results for orders placed during the hospital encounter of 20   Echo complete with contrast if indicated    [address]    Transthoracic Echocardiogram  2D, M-mode, Doppler, and Color Doppler    Study date:  03-Mar-2020    Patient: Bonnie Yung  MR number: [id number]  Account number: [de-identified]  : [id number]  Age: 69 years  Gender: Male  Status: Outpatient  Location: [facility]  Height: 66 in  Weight: 158 6 lb  BP: 140/ 86 mmHg    Indications: Atrial fibrillation    Diagnoses: I48 0 - Atrial fibrillation    Sonographer:  RASHARD Cruz  Primary Physician:  Rosanna Egan MD  Referring Physician:  Autumn Crawford MD  Group:  [facility]  Interpreting Physician:  Oralia Knox, MD    SUMMARY    SUMMARY:  Atrial fibrillation    LEFT VENTRICLE:  Mildly reduced left ventricular systolic function, EF 24%  Mild left ventricular enlargement  Normal left ventricular wall thickness  Mild global hypokinesis without regional wall motion abnormalities  Diastolic function could not be  evaluated due to the presence of atrial fibrillation  LEFT ATRIUM:  The atrium was mildly dilated  MITRAL VALVE:  Moderate mitral regurgitation with 2 separate jets  There was mild annular calcification  AORTIC VALVE:  Tricuspid aortic valve with mild aortic sclerosis  No aortic stenosis or regurgitation  TRICUSPID VALVE:  There was mild to moderate regurgitation  PULMONIC VALVE:  There was trace regurgitation  PULMONARY ARTERIES:  Mild pulmonary hypertension  Pulmonary artery systolic pressures estimated 42 mmHg  HISTORY: PRIOR HISTORY: Atrial fibrillation Risk factors: hypertension and hypercholesterolemia  PROCEDURE: The study was performed in the [facility] OP  This was a routine study  The transthoracic approach was used  The study included complete 2D imaging, M-mode, complete spectral Doppler, and color Doppler  The heart rate was 83  bpm, at the start of the study  Images were obtained from the parasternal, apical, subcostal, and suprasternal notch acoustic windows  Image quality was adequate  LEFT VENTRICLE: Mildly reduced left ventricular systolic function, EF 30%  Mild left ventricular enlargement  Normal left ventricular wall thickness  Mild global hypokinesis without regional wall motion abnormalities  Diastolic function  could not be evaluated due to the presence of atrial fibrillation  RIGHT VENTRICLE: The size was normal  Systolic function was normal  Wall thickness was normal     LEFT ATRIUM: The atrium was mildly dilated  RIGHT ATRIUM: Size was normal     MITRAL VALVE: Moderate mitral regurgitation with 2 separate jets  There was mild annular calcification   Valve structure was normal  There was normal leaflet separation  DOPPLER: The transmitral velocity was within the normal range  There  was no evidence for stenosis  AORTIC VALVE: Tricuspid aortic valve with mild aortic sclerosis  No aortic stenosis or regurgitation  TRICUSPID VALVE: The valve structure was normal  There was normal leaflet separation  DOPPLER: The transtricuspid velocity was within the normal range  There was no evidence for stenosis  There was mild to moderate regurgitation  PULMONIC VALVE: Leaflets exhibited normal thickness, no calcification, and normal cuspal separation  DOPPLER: The transpulmonic velocity was within the normal range  There was trace regurgitation  PERICARDIUM: There was no pericardial effusion  The pericardium was normal in appearance  AORTA: The root exhibited normal size  PULMONARY ARTERY: Mild pulmonary hypertension  Pulmonary artery systolic pressures estimated 42 mmHg  SYSTEM MEASUREMENT TABLES    2D  %FS: 20 23 %  Ao Diam: 3 09 cm  EDV(Teich): 151 55 ml  EF(Teich): 40 88 %  ESV(Teich): 89 6 ml  IVSd: 1 07 cm  LA Area: 21 73 cm2  LA Diam: 4 02 cm  LVEDV MOD A4C: 126 39 ml  LVEF MOD A4C: 45 85 %  LVESV MOD A4C: 68 44 ml  LVIDd: 5 57 cm  LVIDs: 4 44 cm  LVLd A4C: 8 72 cm  LVLs A4C: 7 97 cm  LVPWd: 1 06 cm  RA Area: 17 47 cm2  RVIDd: 3 73 cm  SV MOD A4C: 57 95 ml  SV(Teich): 61 95 ml    CW  TR Vmax: 2 84 m/s  TR maxP 35 mmHg    MM  TAPSE: 2 29 cm    PW  E': 0 11 m/s  E/E': 9 35  MV A Chet: 0 3 m/s  MV Dec Mono: 4 45 m/s2  MV DecT: 226 85 ms  MV E Chet: 1 01 m/s  MV E/A Ratio: 3 32  MV PHT: 65 79 ms  MVA By PHT: 3 34 cm2    [employer] Accredited Echocardiography Laboratory    Prepared and electronically signed by    Bela Long MD  Signed 03-Mar-2020 15:05:15       No results found for this or any previous visit  No results found for this or any previous visit  No results found for this or any previous visit    Echo complete with contrast if indicated  [address]  [facility], [address] [place] [address]    Transthoracic Echocardiogram  2D, M-mode, Doppler, and Color Doppler    Study date:  03-Mar-2020    Patient: Savannah Greene  MR number: [id number]  Account number: [de-identified]  : [id number]  Age: 69 years  Gender: Male  Status: Outpatient  Location: [facility]  Height: 66 in  Weight: 158 6 lb  BP: 140/ 86 mmHg    Indications: Atrial fibrillation    Diagnoses: I48 0 - Atrial fibrillation    Sonographer:  RASHARD Morales  Primary Physician:  Neetu Paz MD  Referring Physician:  Daysi Bailey MD  Group:  [facility]  Interpreting Physician:  Napoleon Gaucher, MD    SUMMARY    SUMMARY:  Atrial fibrillation    LEFT VENTRICLE:  Mildly reduced left ventricular systolic function, EF 94%  Mild left ventricular enlargement  Normal left ventricular wall thickness  Mild global hypokinesis without regional wall motion abnormalities  Diastolic function could not be  evaluated due to the presence of atrial fibrillation  LEFT ATRIUM:  The atrium was mildly dilated  MITRAL VALVE:  Moderate mitral regurgitation with 2 separate jets  There was mild annular calcification  AORTIC VALVE:  Tricuspid aortic valve with mild aortic sclerosis  No aortic stenosis or regurgitation  TRICUSPID VALVE:  There was mild to moderate regurgitation  PULMONIC VALVE:  There was trace regurgitation  PULMONARY ARTERIES:  Mild pulmonary hypertension  Pulmonary artery systolic pressures estimated 42 mmHg  HISTORY: PRIOR HISTORY: Atrial fibrillation Risk factors: hypertension and hypercholesterolemia  PROCEDURE: The study was performed in the [address] [place] [address] OP  This was a routine study  The transthoracic approach was used  The study included complete 2D imaging, M-mode, complete spectral Doppler, and color Doppler  The heart rate was 83  bpm, at the start of the study   Images were obtained from the parasternal, apical, subcostal, and suprasternal notch acoustic windows  Image quality was adequate  LEFT VENTRICLE: Mildly reduced left ventricular systolic function, EF 68%  Mild left ventricular enlargement  Normal left ventricular wall thickness  Mild global hypokinesis without regional wall motion abnormalities  Diastolic function  could not be evaluated due to the presence of atrial fibrillation  RIGHT VENTRICLE: The size was normal  Systolic function was normal  Wall thickness was normal     LEFT ATRIUM: The atrium was mildly dilated  RIGHT ATRIUM: Size was normal     MITRAL VALVE: Moderate mitral regurgitation with 2 separate jets  There was mild annular calcification  Valve structure was normal  There was normal leaflet separation  DOPPLER: The transmitral velocity was within the normal range  There  was no evidence for stenosis  AORTIC VALVE: Tricuspid aortic valve with mild aortic sclerosis  No aortic stenosis or regurgitation  TRICUSPID VALVE: The valve structure was normal  There was normal leaflet separation  DOPPLER: The transtricuspid velocity was within the normal range  There was no evidence for stenosis  There was mild to moderate regurgitation  PULMONIC VALVE: Leaflets exhibited normal thickness, no calcification, and normal cuspal separation  DOPPLER: The transpulmonic velocity was within the normal range  There was trace regurgitation  PERICARDIUM: There was no pericardial effusion  The pericardium was normal in appearance  AORTA: The root exhibited normal size  PULMONARY ARTERY: Mild pulmonary hypertension  Pulmonary artery systolic pressures estimated 42 mmHg      SYSTEM MEASUREMENT TABLES    2D  %FS: 20 23 %  Ao Diam: 3 09 cm  EDV(Teich): 151 55 ml  EF(Teich): 40 88 %  ESV(Teich): 89 6 ml  IVSd: 1 07 cm  LA Area: 21 73 cm2  LA Diam: 4 02 cm  LVEDV MOD A4C: 126 39 ml  LVEF MOD A4C: 45 85 %  LVESV MOD A4C: 68 44 ml  LVIDd: 5 57 cm  LVIDs: 4 44 cm  LVLd A4C: 8 72 cm  LVLs A4C: 7 97 cm  LVPWd: 1 06 cm  RA Area: 17 47 cm2  RVIDd: 3 73 cm  SV MOD A4C: 57 95 ml  SV(Teich): 61 95 ml    CW  TR Vmax: 2 84 m/s  TR maxP 35 mmHg    MM  TAPSE: 2 29 cm    PW  E': 0 11 m/s  E/E': 9 35  MV A Chet: 0 3 m/s  MV Dec Cass: 4 45 m/s2  MV DecT: 226 85 ms  MV E Chet: 1 01 m/s  MV E/A Ratio: 3 32  MV PHT: 65 79 ms  MVA By PHT: 3 34 cm2    [employer] Accredited Echocardiography Laboratory    Prepared and electronically signed by    Steven Mcneal MD  Signed 03-Mar-2020 15:05:15        *-*-*-*-*-*-*-*-*-*-*-*-*-*-*-*-*-*-*-*-*-*-*-*-*-*-*-*-*-*-*-*-*-*-*-*-*-*-*-*-*-*-*-*-*-*-*-*-*-*-*-*-*-*-  SIGNATURES:   @PBS@   Mely Sanchez MD     *-*-*-*-*-*-*-*-*-*-*-*-*-*-*-*-*-*-*-*-*-*-*-*-*-*-*-*-*-*-*-*-*-*-*-*-*-*-*-*-*-*-*-*-*-*-*-*-*-*-*-*-*-*-    Social History     Socioeconomic History    Marital status: /Civil Union     Spouse name: Not on file    Number of children: Not on file    Years of education: Not on file    Highest education level: Not on file   Occupational History    Not on file   Social Needs    Financial resource strain: Not on file    Food insecurity:     Worry: Not on file     Inability: Not on file    Transportation needs:     Medical: Not on file     Non-medical: Not on file   Tobacco Use    Smoking status: Never Smoker    Smokeless tobacco: Never Used    Tobacco comment: No passive smoke exposure   Substance and Sexual Activity    Alcohol use: No    Drug use: No    Sexual activity: Yes     Partners: Female   Lifestyle    Physical activity:     Days per week: Not on file     Minutes per session: Not on file    Stress: Not on file   Relationships    Social connections:     Talks on phone: Not on file     Gets together: Not on file     Attends Restorationist service: Not on file     Active member of club or organization: Not on file     Attends meetings of clubs or organizations: Not on file     Relationship status: Not on file    Intimate partner violence:     Fear of current or ex partner: Not on file     Emotionally abused: Not on file     Physically abused: Not on file     Forced sexual activity: Not on file   Other Topics Concern    Not on file   Social History Narrative    Not on file      Family History   Problem Relation Age of Onset    Diabetes Mother     Heart disease Father      No past surgical history on file

## 2020-03-03 NOTE — ASSESSMENT & PLAN NOTE
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     - at this time a transesophageal echocardiogram and cardioversion are reasonable  I have discussed these procedures with him including its benefits and risks and alternatives and he is agreeable  The FRANK will allow was to further define his mitral valve anatomy   - I am reinforcing with him the importance of taking anticoagulation consistently  - he is also advised to continue to monitor himself for signs and symptoms of heart failure  The following routine measures are being advised to prevent exacerbation of congestive heart failure:     - Daily or atleast weekly checking of weight  Notify your clinicians if greater than 2 lb is gained in 1 day or greater than 5 lb is gained in 1 wk  - Checking for lower extremity swelling  Examine legs for swelling (new or increase in existing swelling) and describe if swelling reaches ankle, shin, or knee  - Monitoring for decrease in exercise tolerance  Check your self for unusual shortness of breath at rest, or with mild exertion, moderate exertion, or none at all     - Monitoring for worsening shortness of breath on lying down  Check for increase in number of pillows used at night and for increase in waking at night with shortness of breath  - Salt/sodium restriction to less than 2 g a day  Calculate total sodium intake in a day from nutrition labels and food charts  Understand hidden sources of salt intake      You are advised to inform us for any concerns regarding above measures

## 2020-03-05 ENCOUNTER — TELEPHONE (OUTPATIENT)
Dept: FAMILY MEDICINE CLINIC | Facility: CLINIC | Age: 75
End: 2020-03-05

## 2020-03-05 NOTE — TELEPHONE ENCOUNTER
----- Message from Sukhjinder Negron MD sent at 3/3/2020  7:22 PM EST -----  Make sure that Pt is seeing Dr du/cardiology

## 2020-03-13 RX ORDER — SODIUM CHLORIDE 9 MG/ML
100 INJECTION, SOLUTION INTRAVENOUS ONCE
Status: CANCELLED | OUTPATIENT
Start: 2020-03-17

## 2020-03-16 ENCOUNTER — TELEPHONE (OUTPATIENT)
Dept: CARDIOLOGY CLINIC | Facility: CLINIC | Age: 75
End: 2020-03-16

## 2020-03-16 NOTE — TELEPHONE ENCOUNTER
Phone call from patient's wife  Beau Morning wants to cancel FRANK/Cardioversion  Does not want to be any where near a hosptial     Notified Brittaney/cath  cancelled procedure sched for tomorrow 3/17/20    Please advise

## 2020-05-29 ENCOUNTER — OFFICE VISIT (OUTPATIENT)
Dept: FAMILY MEDICINE CLINIC | Facility: CLINIC | Age: 75
End: 2020-05-29
Payer: MEDICARE

## 2020-05-29 ENCOUNTER — OFFICE VISIT (OUTPATIENT)
Dept: CARDIOLOGY CLINIC | Facility: CLINIC | Age: 75
End: 2020-05-29
Payer: MEDICARE

## 2020-05-29 ENCOUNTER — TELEPHONE (OUTPATIENT)
Dept: CARDIOLOGY CLINIC | Facility: CLINIC | Age: 75
End: 2020-05-29

## 2020-05-29 VITALS
SYSTOLIC BLOOD PRESSURE: 134 MMHG | DIASTOLIC BLOOD PRESSURE: 60 MMHG | TEMPERATURE: 96 F | HEART RATE: 74 BPM | BODY MASS INDEX: 25.5 KG/M2 | WEIGHT: 158 LBS

## 2020-05-29 VITALS
WEIGHT: 160.3 LBS | HEART RATE: 78 BPM | OXYGEN SATURATION: 97 % | TEMPERATURE: 98 F | DIASTOLIC BLOOD PRESSURE: 82 MMHG | RESPIRATION RATE: 14 BRPM | HEIGHT: 66 IN | SYSTOLIC BLOOD PRESSURE: 138 MMHG | BODY MASS INDEX: 25.76 KG/M2

## 2020-05-29 DIAGNOSIS — C91.10 CHRONIC LYMPHOCYTIC LEUKEMIA (HCC): ICD-10-CM

## 2020-05-29 DIAGNOSIS — I42.8 NONISCHEMIC CARDIOMYOPATHY (HCC): ICD-10-CM

## 2020-05-29 DIAGNOSIS — I10 ESSENTIAL HYPERTENSION: ICD-10-CM

## 2020-05-29 DIAGNOSIS — I48.19 PERSISTENT ATRIAL FIBRILLATION (HCC): Primary | ICD-10-CM

## 2020-05-29 DIAGNOSIS — N40.0 ENLARGED PROSTATE: ICD-10-CM

## 2020-05-29 DIAGNOSIS — I27.20 MILD PULMONARY HYPERTENSION (HCC): ICD-10-CM

## 2020-05-29 DIAGNOSIS — I48.21 PERMANENT ATRIAL FIBRILLATION (HCC): Primary | ICD-10-CM

## 2020-05-29 DIAGNOSIS — I34.0 MODERATE MITRAL VALVE REGURGITATION: ICD-10-CM

## 2020-05-29 DIAGNOSIS — I10 BENIGN ESSENTIAL HTN: ICD-10-CM

## 2020-05-29 DIAGNOSIS — I48.91 RAPID ATRIAL FIBRILLATION (HCC): ICD-10-CM

## 2020-05-29 PROCEDURE — 1036F TOBACCO NON-USER: CPT | Performed by: INTERNAL MEDICINE

## 2020-05-29 PROCEDURE — 93000 ELECTROCARDIOGRAM COMPLETE: CPT | Performed by: INTERNAL MEDICINE

## 2020-05-29 PROCEDURE — 3008F BODY MASS INDEX DOCD: CPT | Performed by: INTERNAL MEDICINE

## 2020-05-29 PROCEDURE — 3078F DIAST BP <80 MM HG: CPT | Performed by: INTERNAL MEDICINE

## 2020-05-29 PROCEDURE — 99214 OFFICE O/P EST MOD 30 MIN: CPT | Performed by: INTERNAL MEDICINE

## 2020-05-29 PROCEDURE — 3075F SYST BP GE 130 - 139MM HG: CPT | Performed by: INTERNAL MEDICINE

## 2020-05-29 PROCEDURE — 1160F RVW MEDS BY RX/DR IN RCRD: CPT | Performed by: INTERNAL MEDICINE

## 2020-05-29 PROCEDURE — 4040F PNEUMOC VAC/ADMIN/RCVD: CPT | Performed by: INTERNAL MEDICINE

## 2020-06-01 ENCOUNTER — DOCUMENTATION (OUTPATIENT)
Dept: CARDIOLOGY CLINIC | Facility: CLINIC | Age: 75
End: 2020-06-01

## 2020-06-08 ENCOUNTER — APPOINTMENT (OUTPATIENT)
Dept: LAB | Facility: MEDICAL CENTER | Age: 75
End: 2020-06-08
Payer: MEDICARE

## 2020-06-08 DIAGNOSIS — I27.20 MILD PULMONARY HYPERTENSION (HCC): ICD-10-CM

## 2020-06-08 DIAGNOSIS — I34.0 MODERATE MITRAL VALVE REGURGITATION: ICD-10-CM

## 2020-06-08 DIAGNOSIS — I48.19 PERSISTENT ATRIAL FIBRILLATION (HCC): ICD-10-CM

## 2020-06-08 LAB
ALBUMIN SERPL BCP-MCNC: 3.5 G/DL (ref 3.5–5)
ALP SERPL-CCNC: 97 U/L (ref 46–116)
ALT SERPL W P-5'-P-CCNC: 21 U/L (ref 12–78)
ANION GAP SERPL CALCULATED.3IONS-SCNC: 5 MMOL/L (ref 4–13)
AST SERPL W P-5'-P-CCNC: 17 U/L (ref 5–45)
BASOPHILS # BLD AUTO: 0.1 THOUSANDS/ΜL (ref 0–0.1)
BASOPHILS NFR BLD AUTO: 2 % (ref 0–1)
BILIRUB SERPL-MCNC: 0.44 MG/DL (ref 0.2–1)
BILIRUB UR QL STRIP: NEGATIVE
BUN SERPL-MCNC: 12 MG/DL (ref 5–25)
CALCIUM SERPL-MCNC: 9.2 MG/DL (ref 8.3–10.1)
CHLORIDE SERPL-SCNC: 106 MMOL/L (ref 100–108)
CLARITY UR: CLEAR
CO2 SERPL-SCNC: 28 MMOL/L (ref 21–32)
COLOR UR: YELLOW
CREAT SERPL-MCNC: 1 MG/DL (ref 0.6–1.3)
EOSINOPHIL # BLD AUTO: 0.12 THOUSAND/ΜL (ref 0–0.61)
EOSINOPHIL NFR BLD AUTO: 2 % (ref 0–6)
ERYTHROCYTE [DISTWIDTH] IN BLOOD BY AUTOMATED COUNT: 14 % (ref 11.6–15.1)
GFR SERPL CREATININE-BSD FRML MDRD: 74 ML/MIN/1.73SQ M
GLUCOSE P FAST SERPL-MCNC: 98 MG/DL (ref 65–99)
GLUCOSE UR STRIP-MCNC: NEGATIVE MG/DL
HCT VFR BLD AUTO: 49.8 % (ref 36.5–49.3)
HGB BLD-MCNC: 16.3 G/DL (ref 12–17)
HGB UR QL STRIP.AUTO: NEGATIVE
IMM GRANULOCYTES # BLD AUTO: 0.03 THOUSAND/UL (ref 0–0.2)
IMM GRANULOCYTES NFR BLD AUTO: 1 % (ref 0–2)
KETONES UR STRIP-MCNC: NEGATIVE MG/DL
LEUKOCYTE ESTERASE UR QL STRIP: NEGATIVE
LYMPHOCYTES # BLD AUTO: 0.77 THOUSANDS/ΜL (ref 0.6–4.47)
LYMPHOCYTES NFR BLD AUTO: 14 % (ref 14–44)
MCH RBC QN AUTO: 30.4 PG (ref 26.8–34.3)
MCHC RBC AUTO-ENTMCNC: 32.7 G/DL (ref 31.4–37.4)
MCV RBC AUTO: 93 FL (ref 82–98)
MONOCYTES # BLD AUTO: 1.09 THOUSAND/ΜL (ref 0.17–1.22)
MONOCYTES NFR BLD AUTO: 20 % (ref 4–12)
NEUTROPHILS # BLD AUTO: 3.25 THOUSANDS/ΜL (ref 1.85–7.62)
NEUTS SEG NFR BLD AUTO: 61 % (ref 43–75)
NITRITE UR QL STRIP: NEGATIVE
NRBC BLD AUTO-RTO: 0 /100 WBCS
NT-PROBNP SERPL-MCNC: 526 PG/ML
PH UR STRIP.AUTO: 5 [PH]
PLATELET # BLD AUTO: 242 THOUSANDS/UL (ref 149–390)
PMV BLD AUTO: 10.9 FL (ref 8.9–12.7)
POTASSIUM SERPL-SCNC: 4.5 MMOL/L (ref 3.5–5.3)
PROT SERPL-MCNC: 6.5 G/DL (ref 6.4–8.2)
PROT UR STRIP-MCNC: NEGATIVE MG/DL
RBC # BLD AUTO: 5.37 MILLION/UL (ref 3.88–5.62)
SODIUM SERPL-SCNC: 139 MMOL/L (ref 136–145)
SP GR UR STRIP.AUTO: 1.02 (ref 1–1.03)
UROBILINOGEN UR QL STRIP.AUTO: 0.2 E.U./DL
WBC # BLD AUTO: 5.36 THOUSAND/UL (ref 4.31–10.16)

## 2020-06-08 PROCEDURE — 36415 COLL VENOUS BLD VENIPUNCTURE: CPT | Performed by: INTERNAL MEDICINE

## 2020-06-08 PROCEDURE — 85025 COMPLETE CBC W/AUTO DIFF WBC: CPT | Performed by: INTERNAL MEDICINE

## 2020-06-08 PROCEDURE — 83880 ASSAY OF NATRIURETIC PEPTIDE: CPT

## 2020-06-08 PROCEDURE — 81003 URINALYSIS AUTO W/O SCOPE: CPT | Performed by: INTERNAL MEDICINE

## 2020-06-08 PROCEDURE — U0003 INFECTIOUS AGENT DETECTION BY NUCLEIC ACID (DNA OR RNA); SEVERE ACUTE RESPIRATORY SYNDROME CORONAVIRUS 2 (SARS-COV-2) (CORONAVIRUS DISEASE [COVID-19]), AMPLIFIED PROBE TECHNIQUE, MAKING USE OF HIGH THROUGHPUT TECHNOLOGIES AS DESCRIBED BY CMS-2020-01-R: HCPCS

## 2020-06-08 PROCEDURE — 80053 COMPREHEN METABOLIC PANEL: CPT

## 2020-06-09 LAB — SARS-COV-2 RNA SPEC QL NAA+PROBE: NOT DETECTED

## 2020-06-11 ENCOUNTER — TELEPHONE (OUTPATIENT)
Dept: SURGERY | Facility: HOSPITAL | Age: 75
End: 2020-06-11

## 2020-06-11 ENCOUNTER — TELEPHONE (OUTPATIENT)
Dept: CARDIOLOGY CLINIC | Facility: CLINIC | Age: 75
End: 2020-06-11

## 2020-06-11 RX ORDER — SODIUM CHLORIDE 9 MG/ML
100 INJECTION, SOLUTION INTRAVENOUS ONCE
Status: CANCELLED | OUTPATIENT
Start: 2020-06-11 | End: 2020-06-11

## 2020-06-12 ENCOUNTER — ANESTHESIA EVENT (OUTPATIENT)
Dept: NON INVASIVE DIAGNOSTICS | Facility: HOSPITAL | Age: 75
End: 2020-06-12

## 2020-06-12 ENCOUNTER — HOSPITAL ENCOUNTER (OUTPATIENT)
Dept: NON INVASIVE DIAGNOSTICS | Facility: HOSPITAL | Age: 75
Discharge: HOME | End: 2020-06-12
Attending: INTERNAL MEDICINE
Payer: MEDICARE

## 2020-06-12 ENCOUNTER — ANESTHESIA (OUTPATIENT)
Dept: NON INVASIVE DIAGNOSTICS | Facility: HOSPITAL | Age: 75
End: 2020-06-12

## 2020-06-12 ENCOUNTER — HOSPITAL ENCOUNTER (OUTPATIENT)
Dept: NON INVASIVE DIAGNOSTICS | Facility: HOSPITAL | Age: 75
Discharge: HOME | End: 2020-06-12
Attending: INTERNAL MEDICINE | Admitting: INTERNAL MEDICINE
Payer: MEDICARE

## 2020-06-12 ENCOUNTER — TELEPHONE (OUTPATIENT)
Dept: CARDIOLOGY CLINIC | Facility: CLINIC | Age: 75
End: 2020-06-12

## 2020-06-12 VITALS
BODY MASS INDEX: 25.39 KG/M2 | DIASTOLIC BLOOD PRESSURE: 78 MMHG | SYSTOLIC BLOOD PRESSURE: 147 MMHG | TEMPERATURE: 97.5 F | RESPIRATION RATE: 18 BRPM | OXYGEN SATURATION: 97 % | HEART RATE: 63 BPM | HEIGHT: 66 IN | WEIGHT: 158 LBS

## 2020-06-12 DIAGNOSIS — I34.0 MODERATE MITRAL VALVE REGURGITATION: ICD-10-CM

## 2020-06-12 DIAGNOSIS — I48.19 PERSISTENT ATRIAL FIBRILLATION (HCC): ICD-10-CM

## 2020-06-12 DIAGNOSIS — I27.20 MILD PULMONARY HYPERTENSION (HCC): ICD-10-CM

## 2020-06-12 DIAGNOSIS — I48.91 RAPID ATRIAL FIBRILLATION (HCC): ICD-10-CM

## 2020-06-12 PROCEDURE — 93005 ELECTROCARDIOGRAM TRACING: CPT

## 2020-06-12 PROCEDURE — 93320 DOPPLER ECHO COMPLETE: CPT | Performed by: INTERNAL MEDICINE

## 2020-06-12 PROCEDURE — 92960 CARDIOVERSION ELECTRIC EXT: CPT | Performed by: INTERNAL MEDICINE

## 2020-06-12 PROCEDURE — 92960 CARDIOVERSION ELECTRIC EXT: CPT

## 2020-06-12 PROCEDURE — 93325 DOPPLER ECHO COLOR FLOW MAPG: CPT | Performed by: INTERNAL MEDICINE

## 2020-06-12 PROCEDURE — 93312 ECHO TRANSESOPHAGEAL: CPT

## 2020-06-12 PROCEDURE — 93312 ECHO TRANSESOPHAGEAL: CPT | Performed by: INTERNAL MEDICINE

## 2020-06-12 PROCEDURE — 1124F ACP DISCUSS-NO DSCNMKR DOCD: CPT | Performed by: INTERNAL MEDICINE

## 2020-06-12 RX ORDER — MIDAZOLAM HYDROCHLORIDE 2 MG/2ML
INJECTION, SOLUTION INTRAMUSCULAR; INTRAVENOUS AS NEEDED
Status: DISCONTINUED | OUTPATIENT
Start: 2020-06-12 | End: 2020-06-12 | Stop reason: SURG

## 2020-06-12 RX ORDER — LIDOCAINE HYDROCHLORIDE 20 MG/ML
INJECTION, SOLUTION INFILTRATION; PERINEURAL AS NEEDED
Status: DISCONTINUED | OUTPATIENT
Start: 2020-06-12 | End: 2020-06-12 | Stop reason: SURG

## 2020-06-12 RX ORDER — PROPOFOL 10 MG/ML
INJECTION, EMULSION INTRAVENOUS AS NEEDED
Status: DISCONTINUED | OUTPATIENT
Start: 2020-06-12 | End: 2020-06-12 | Stop reason: SURG

## 2020-06-12 RX ORDER — DIGOXIN 125 MCG
125 TABLET ORAL 3 TIMES WEEKLY
Qty: 30 TABLET | Refills: 0 | Status: SHIPPED | OUTPATIENT
Start: 2020-06-12 | End: 2021-05-13 | Stop reason: SDUPTHER

## 2020-06-12 RX ORDER — SODIUM CHLORIDE 9 MG/ML
100 INJECTION, SOLUTION INTRAVENOUS ONCE
Status: COMPLETED | OUTPATIENT
Start: 2020-06-12 | End: 2020-06-12

## 2020-06-12 RX ADMIN — PROPOFOL 50 MG: 10 INJECTION, EMULSION INTRAVENOUS at 11:25

## 2020-06-12 RX ADMIN — PROPOFOL 40 MG: 10 INJECTION, EMULSION INTRAVENOUS at 11:31

## 2020-06-12 RX ADMIN — PROPOFOL 20 MG: 10 INJECTION, EMULSION INTRAVENOUS at 11:29

## 2020-06-12 RX ADMIN — SODIUM CHLORIDE 100 ML/HR: 0.9 INJECTION, SOLUTION INTRAVENOUS at 11:00

## 2020-06-12 RX ADMIN — MIDAZOLAM 2 MG: 1 INJECTION INTRAMUSCULAR; INTRAVENOUS at 11:21

## 2020-06-12 RX ADMIN — LIDOCAINE HYDROCHLORIDE 2.5 ML: 20 INJECTION, SOLUTION INFILTRATION; PERINEURAL at 11:23

## 2020-06-12 RX ADMIN — PROPOFOL 100 MG: 10 INJECTION, EMULSION INTRAVENOUS at 11:23

## 2020-06-12 RX ADMIN — PROPOFOL 30 MG: 10 INJECTION, EMULSION INTRAVENOUS at 11:27

## 2020-06-12 RX ADMIN — PROPOFOL 40 MG: 10 INJECTION, EMULSION INTRAVENOUS at 11:34

## 2020-06-13 LAB
ATRIAL RATE: 416 BPM
ATRIAL RATE: 66 BPM
P AXIS: 72 DEGREES
PR INTERVAL: 226 MS
QRS AXIS: -5 DEGREES
QRS AXIS: 3 DEGREES
QRSD INTERVAL: 104 MS
QRSD INTERVAL: 110 MS
QT INTERVAL: 382 MS
QT INTERVAL: 416 MS
QTC INTERVAL: 436 MS
QTC INTERVAL: 440 MS
T WAVE AXIS: 52 DEGREES
T WAVE AXIS: 70 DEGREES
VENTRICULAR RATE: 66 BPM
VENTRICULAR RATE: 80 BPM

## 2020-06-13 PROCEDURE — 93010 ELECTROCARDIOGRAM REPORT: CPT | Performed by: INTERNAL MEDICINE

## 2020-06-15 ENCOUNTER — TELEPHONE (OUTPATIENT)
Dept: CARDIOLOGY CLINIC | Facility: CLINIC | Age: 75
End: 2020-06-15

## 2020-06-17 ENCOUNTER — TELEPHONE (OUTPATIENT)
Dept: CARDIOLOGY CLINIC | Facility: CLINIC | Age: 75
End: 2020-06-17

## 2020-06-19 ENCOUNTER — CLINICAL SUPPORT (OUTPATIENT)
Dept: CARDIOLOGY CLINIC | Facility: CLINIC | Age: 75
End: 2020-06-19
Payer: MEDICARE

## 2020-06-19 VITALS — SYSTOLIC BLOOD PRESSURE: 122 MMHG | TEMPERATURE: 99.5 F | HEART RATE: 52 BPM | DIASTOLIC BLOOD PRESSURE: 62 MMHG

## 2020-06-19 DIAGNOSIS — I48.19 PERSISTENT ATRIAL FIBRILLATION (HCC): Primary | ICD-10-CM

## 2020-06-19 DIAGNOSIS — M19.90 ARTHRITIS: ICD-10-CM

## 2020-06-19 PROCEDURE — RECHECK

## 2020-06-19 PROCEDURE — 93000 ELECTROCARDIOGRAM COMPLETE: CPT

## 2020-06-30 DIAGNOSIS — G89.29 CHRONIC LEFT SHOULDER PAIN: Primary | ICD-10-CM

## 2020-06-30 DIAGNOSIS — M25.512 CHRONIC LEFT SHOULDER PAIN: Primary | ICD-10-CM

## 2020-06-30 NOTE — TELEPHONE ENCOUNTER
Yes he only uses them as needed per Elina Covert when he is in a lot of pain  He has about 10 tablets left in his bottle

## 2020-07-27 DIAGNOSIS — M25.512 CHRONIC LEFT SHOULDER PAIN: ICD-10-CM

## 2020-07-27 DIAGNOSIS — G89.29 CHRONIC LEFT SHOULDER PAIN: ICD-10-CM

## 2020-08-27 ENCOUNTER — TELEPHONE (OUTPATIENT)
Dept: OTHER | Facility: OTHER | Age: 75
End: 2020-08-27

## 2020-08-30 DIAGNOSIS — G89.29 CHRONIC LEFT SHOULDER PAIN: ICD-10-CM

## 2020-08-30 DIAGNOSIS — M25.512 CHRONIC LEFT SHOULDER PAIN: ICD-10-CM

## 2020-09-02 DIAGNOSIS — M19.90 ARTHRITIS: Primary | ICD-10-CM

## 2020-09-02 RX ORDER — CELECOXIB 200 MG/1
200 CAPSULE ORAL DAILY
Qty: 30 CAPSULE | Refills: 3 | Status: SHIPPED | OUTPATIENT
Start: 2020-09-02 | End: 2021-09-07

## 2020-12-11 DIAGNOSIS — J06.9 ACUTE URI: Primary | ICD-10-CM

## 2020-12-11 RX ORDER — AMOXICILLIN AND CLAVULANATE POTASSIUM 875; 125 MG/1; MG/1
1 TABLET, FILM COATED ORAL EVERY 12 HOURS SCHEDULED
Qty: 14 TABLET | Refills: 0 | Status: SHIPPED | OUTPATIENT
Start: 2020-12-11 | End: 2020-12-18

## 2020-12-11 RX ORDER — GUAIFENESIN/DEXTROMETHORPHAN 100-10MG/5
5 SYRUP ORAL 3 TIMES DAILY PRN
Qty: 118 ML | Refills: 1 | Status: SHIPPED | OUTPATIENT
Start: 2020-12-11 | End: 2021-05-03

## 2020-12-11 RX ORDER — FLUTICASONE PROPIONATE 50 MCG
2 SPRAY, SUSPENSION (ML) NASAL DAILY
Qty: 1 BOTTLE | Refills: 3 | Status: SHIPPED | OUTPATIENT
Start: 2020-12-11 | End: 2021-05-13

## 2021-01-29 DIAGNOSIS — I10 ESSENTIAL HYPERTENSION, MALIGNANT: ICD-10-CM

## 2021-01-29 RX ORDER — NEBIVOLOL HYDROCHLORIDE 10 MG/1
TABLET ORAL
Qty: 90 TABLET | Refills: 3 | Status: SHIPPED | OUTPATIENT
Start: 2021-01-29 | End: 2021-05-13 | Stop reason: SDUPTHER

## 2021-04-26 ENCOUNTER — HOSPITAL ENCOUNTER (EMERGENCY)
Facility: HOSPITAL | Age: 76
Discharge: HOME | End: 2021-04-26
Attending: EMERGENCY MEDICINE
Payer: MEDICARE

## 2021-04-26 ENCOUNTER — APPOINTMENT (EMERGENCY)
Dept: RADIOLOGY | Facility: HOSPITAL | Age: 76
End: 2021-04-26
Payer: MEDICARE

## 2021-04-26 VITALS
SYSTOLIC BLOOD PRESSURE: 143 MMHG | OXYGEN SATURATION: 100 % | RESPIRATION RATE: 16 BRPM | DIASTOLIC BLOOD PRESSURE: 85 MMHG | BODY MASS INDEX: 25.26 KG/M2 | WEIGHT: 156.53 LBS | TEMPERATURE: 97.5 F | HEART RATE: 100 BPM

## 2021-04-26 DIAGNOSIS — Z20.822 SUSPECTED COVID-19 VIRUS INFECTION: Primary | ICD-10-CM

## 2021-04-26 DIAGNOSIS — I48.91 A-FIB (HCC): ICD-10-CM

## 2021-04-26 DIAGNOSIS — R53.83 FATIGUE: ICD-10-CM

## 2021-04-26 LAB
ATRIAL RATE: 197 BPM
QRS AXIS: 38 DEGREES
QRSD INTERVAL: 96 MS
QT INTERVAL: 362 MS
QTC INTERVAL: 430 MS
SARS-COV-2 RNA RESP QL NAA+PROBE: POSITIVE
T WAVE AXIS: 53 DEGREES
VENTRICULAR RATE: 85 BPM

## 2021-04-26 PROCEDURE — U0003 INFECTIOUS AGENT DETECTION BY NUCLEIC ACID (DNA OR RNA); SEVERE ACUTE RESPIRATORY SYNDROME CORONAVIRUS 2 (SARS-COV-2) (CORONAVIRUS DISEASE [COVID-19]), AMPLIFIED PROBE TECHNIQUE, MAKING USE OF HIGH THROUGHPUT TECHNOLOGIES AS DESCRIBED BY CMS-2020-01-R: HCPCS | Performed by: EMERGENCY MEDICINE

## 2021-04-26 PROCEDURE — U0005 INFEC AGEN DETEC AMPLI PROBE: HCPCS | Performed by: EMERGENCY MEDICINE

## 2021-04-26 PROCEDURE — 99285 EMERGENCY DEPT VISIT HI MDM: CPT

## 2021-04-26 PROCEDURE — 71045 X-RAY EXAM CHEST 1 VIEW: CPT

## 2021-04-26 PROCEDURE — 93005 ELECTROCARDIOGRAM TRACING: CPT

## 2021-04-26 PROCEDURE — 93010 ELECTROCARDIOGRAM REPORT: CPT | Performed by: INTERNAL MEDICINE

## 2021-04-26 PROCEDURE — 99285 EMERGENCY DEPT VISIT HI MDM: CPT | Performed by: EMERGENCY MEDICINE

## 2021-04-26 NOTE — ED NOTES
Pt refusing blood work at this time  Stating, "I just don't want it"  Dr Brenna Barboza aware        Taylor Miranda, RN  04/26/21 5218

## 2021-04-26 NOTE — ED PROVIDER NOTES
History  Chief Complaint   Patient presents with    Weakness - Generalized     Patient reports generalized weakness, decreased appetite, dry cough  reports family member were positive for covid  symptoms 8 days  This is a 61-year-old male with a history of hypertension, hyperlipidemia, CLL who presents with COVID like symptoms  Over the past week, the patient has been experiencing fatigue, cough  The cough is nonproductive  Family members in his house have tested positive for [place]  States that initially, he was experiencing shortness of breath mainly at night  However, this has resolved  Denies fever/chills, nausea/vomiting, lightheadedness/dizziness, numbness/weakness, headache, change in vision, URI symptoms, neck pain, chest pain, palpitations, shortness of breath, back pain, flank pain, abdominal pain, diarrhea, hematochezia, melena, dysuria, hematuria  Prior to Admission Medications   Prescriptions Last Dose Informant Patient Reported? Taking? Bystolic 10 MG tablet   No Yes   Sig: take 1 tablet by mouth once daily   Ibrutinib (IMBRUVICA) 140 MG TABS  Self No Yes   Sig: Take 140 mg by mouth 2 (two) times a day   apixaban (ELIQUIS) 5 mg  Self No Yes   Sig: Take 1 tablet (5 mg total) by mouth 2 (two) times a day   b complex-C-E-zinc (STRESS FORMULA/ZINC) tablet  Self No No   Sig: Take 1 tablet by mouth daily   celecoxib (CeleBREX) 200 mg capsule   No Yes   Sig: Take 1 capsule (200 mg total) by mouth daily Please Stop Voltaren   dextromethorphan-guaiFENesin (ROBITUSSIN DM)  mg/5 mL syrup   No Yes   Sig: Take 5 mL by mouth 3 (three) times a day as needed for cough or congestion   digoxin (Lanoxin) 0 125 mg tablet Not Taking at Unknown time  No No   Sig: Take 1 tablet (125 mcg total) by mouth 3 (three) times a week Take on Monday, Wednesday and Friday     Patient not taking: Reported on 2021   fluticasone (FLONASE) 50 mcg/act nasal spray   No Yes   Si sprays into each nostril daily   tamsulosin (FLOMAX) 0 4 mg  Self No Yes   Sig: Take 1 capsule (0 4 mg total) by mouth daily with dinner   Patient taking differently: Take 0 4 mg by mouth daily with dinner "Takes when he needs it"      Facility-Administered Medications: None       Past Medical History:   Diagnosis Date    Hypertension     Leukemia ([facility] [address] )     Osteoarthritis 9/26/2012       Past Surgical History:   Procedure Laterality Date    CARDIOVERSION (NON INVASIVE ONLY)  6/12/2020         NO PAST SURGERIES      MT ECHO TRANSESOPHAG R-T 2D W/PRB IMG ACQUISJ I&R  6/12/2020            Family History   Problem Relation Age of Onset    Diabetes Mother     Heart disease Father      I have reviewed and agree with the history as documented  E-Cigarette/Vaping    E-Cigarette Use Never User      E-Cigarette/Vaping Substances    Nicotine No     THC No     CBD No     Flavoring No      Social History     Tobacco Use    Smoking status: Never Smoker    Smokeless tobacco: Never Used    Tobacco comment: No passive smoke exposure   Substance Use Topics    Alcohol use: Yes     Frequency: Monthly or less    Drug use: No       Review of Systems   Constitutional: Positive for fatigue  Negative for chills and fever  HENT: Negative for rhinorrhea, sore throat and trouble swallowing  Eyes: Negative for photophobia and visual disturbance  Respiratory: Positive for cough  Negative for chest tightness and shortness of breath  Cardiovascular: Negative for chest pain, palpitations and leg swelling  Gastrointestinal: Negative for abdominal pain, blood in stool, diarrhea, nausea and vomiting  Endocrine: Negative for polyuria  Genitourinary: Negative for dysuria, flank pain and hematuria  Musculoskeletal: Negative for back pain and neck pain  Skin: Negative for color change and rash  Allergic/Immunologic: Negative for immunocompromised state     Neurological: Negative for dizziness, weakness, light-headedness, numbness and headaches  All other systems reviewed and are negative  Physical Exam  Physical Exam  Constitutional:       General: He is not in acute distress  Appearance: Normal appearance  He is well-developed  HENT:      Mouth/Throat:      Pharynx: Uvula midline  Eyes:      General: Lids are normal       Conjunctiva/sclera: Conjunctivae normal       Pupils: Pupils are equal, round, and reactive to light  Neck:      Thyroid: No thyroid mass or thyromegaly  Trachea: Trachea normal    Cardiovascular:      Rate and Rhythm: Normal rate and regular rhythm  Pulses: Normal pulses  Heart sounds: Normal heart sounds  No murmur  Pulmonary:      Effort: Pulmonary effort is normal       Breath sounds: Normal breath sounds  Comments: Speaking in full sentences  Saturating well on room air  Abdominal:      General: Bowel sounds are normal       Palpations: Abdomen is soft  Tenderness: There is no abdominal tenderness  There is no guarding or rebound  Negative signs include Parkinson's sign  Skin:     General: Skin is warm and dry  Neurological:      Mental Status: He is alert  Psychiatric:         Speech: Speech normal          Behavior: Behavior normal  Behavior is cooperative  Thought Content:  Thought content normal          Vital Signs  ED Triage Vitals [04/26/21 1550]   Temperature Pulse Respirations Blood Pressure SpO2   97 5 °F (36 4 °C) 100 16 143/85 100 %      Temp src Heart Rate Source Patient Position - Orthostatic VS BP Location FiO2 (%)   -- Monitor Sitting Right arm --      Pain Score       --           Vitals:    04/26/21 1550   BP: 143/85   Pulse: 100   Patient Position - Orthostatic VS: Sitting         Visual Acuity      ED Medications  Medications - No data to display    Diagnostic Studies  Results Reviewed     Procedure Component Value Units Date/Time    Novel Coronavirus Karan MARTIN [facility] - 2 Hour Stat [[id number]] Collected: 04/26/21 1740    Lab Status: No result Specimen: Nares from Nasopharyngeal Swab     CBC and differential [[id number]]     Lab Status: No result Specimen: Blood     Protime-INR [[id number]]     Lab Status: No result Specimen: Blood     APTT [[id number]]     Lab Status: No result Specimen: Blood     Basic metabolic panel [[id number]]     Lab Status: No result Specimen: Blood     Troponin I [[id number]]     Lab Status: No result Specimen: Blood                  XR chest 1 view portable   ED Interpretation by Souleymane Wesley MD (04/26 1747)   No acute cardiopulmonary disease as interpreted by myself  Procedures  ECG 12 Lead Documentation Only    Date/Time: 4/26/2021 5:46 PM  Performed by: Souleymane Wesley MD  Authorized by: Souleymane Wesley MD     ECG reviewed by me, the ED Provider: yes    Patient location:  ED  Previous ECG:     Previous ECG:  Compared to current    Comparison ECG info:  6/12/20    Similarity:  No change    Comparison to cardiac monitor: Yes    Interpretation:     Interpretation: non-specific    Rate:     ECG rate:  85    ECG rate assessment: normal    Rhythm:     Rhythm: atrial fibrillation    Ectopy:     Ectopy: none    QRS:     QRS axis:  Normal    QRS intervals:  Normal  Conduction:     Conduction: normal    ST segments:     ST segments:  Normal  T waves:     T waves: normal               ED Course  ED Course as of Apr 26 1749   Mon Apr 26, 2021 1745 EKG shows likely AFib  He is on digoxin and Eliquis  History of AFib on previous EKGs  1747 Patient currently refusing blood work  Will discharge at this time strict return precautions  MDM  Number of Diagnoses or Management Options  Diagnosis management comments: Plan to check labs, chest x-ray  COVID swab  Likely discharge with PCP follow-up        Disposition  Final diagnoses:   Suspected COVID-19 virus infection   Fatigue   A-fib ([address] )     Time reflects when diagnosis was documented in both MDM as applicable and the Disposition within this note     Time User Action Codes Description Comment    4/26/2021  5:48 PM Erby Santiago Add [Z20 822] Suspected COVID-19 virus infection     4/26/2021  5:48 PM Erby Santiago Add [R53 83] Fatigue     4/26/2021  5:48 PM Erby Santiago Add [I48 91] [facility])       ED Disposition     ED Disposition Condition Date/Time Comment    Discharge Stable Mon Apr 26, 2021  5:48 PM [address] discharge to home/self care  Follow-up Information     Follow up With Specialties Details Why Contact Info Additional Gui Ceron MD Internal Medicine Schedule an appointment as soon as possible for a visit   [address] [address] [phone]  Emergency Department Emergency Medicine Go to  If symptoms worsen [facility] [id number]  [facility] Emergency Department, [address] [place] [address], [id number]          Patient's Medications   Discharge Prescriptions    No medications on file     No discharge procedures on file      PDMP Review       Value Time User    PDMP Reviewed  Yes 2/6/2020  8:38 Maria Isabel Wood MD          ED Provider  Electronically Signed by           Marie William MD  04/26/21 7232

## 2021-04-26 NOTE — DISCHARGE INSTRUCTIONS

## 2021-04-27 NOTE — RESULT ENCOUNTER NOTE
Discussed positive covid results with wife  wife reports she been checking pulse ox at home and is >94% ambulatory  Counseled wife to continue taking pulse ox periodically and if drops below 92% to return to ED  wife verbalized understanding and all questions answered

## 2021-04-28 ENCOUNTER — TELEPHONE (OUTPATIENT)
Dept: FAMILY MEDICINE CLINIC | Facility: CLINIC | Age: 76
End: 2021-04-28

## 2021-04-28 NOTE — TELEPHONE ENCOUNTER
Called daughter and let her know to take him back to the ed  She stated she will bring him back   - as

## 2021-05-03 ENCOUNTER — TELEMEDICINE (OUTPATIENT)
Dept: FAMILY MEDICINE CLINIC | Facility: CLINIC | Age: 76
End: 2021-05-03
Payer: MEDICARE

## 2021-05-03 DIAGNOSIS — U07.1 LAB TEST POSITIVE FOR DETECTION OF COVID-19 VIRUS: ICD-10-CM

## 2021-05-03 DIAGNOSIS — J06.9 ACUTE URI: Primary | ICD-10-CM

## 2021-05-03 PROCEDURE — 99213 OFFICE O/P EST LOW 20 MIN: CPT | Performed by: INTERNAL MEDICINE

## 2021-05-03 RX ORDER — PROMETHAZINE HYDROCHLORIDE AND CODEINE PHOSPHATE 6.25; 1 MG/5ML; MG/5ML
5 SYRUP ORAL 2 TIMES DAILY PRN
Qty: 60 ML | Refills: 0 | Status: SHIPPED | OUTPATIENT
Start: 2021-05-03 | End: 2021-05-13

## 2021-05-03 RX ORDER — LEVOFLOXACIN 500 MG/1
500 TABLET, FILM COATED ORAL EVERY 24 HOURS
Qty: 10 TABLET | Refills: 0 | Status: SHIPPED | OUTPATIENT
Start: 2021-05-03 | End: 2021-05-13

## 2021-05-03 RX ORDER — BENZONATATE 100 MG/1
100 CAPSULE ORAL 3 TIMES DAILY PRN
Qty: 30 CAPSULE | Refills: 0 | Status: SHIPPED | OUTPATIENT
Start: 2021-05-03 | End: 2021-05-13

## 2021-05-03 NOTE — PROGRESS NOTES
Virtual Regular Visit      Assessment/Plan:    Problem List Items Addressed This Visit        Respiratory    Acute URI - Primary     Bed rest  Increase Po fluids  Levaquin 500 mg  Phenergan w Cod  Tessalon Pearls  RTC in 1 week         Relevant Medications    levofloxacin (LEVAQUIN) 500 mg tablet    benzonatate (TESSALON PERLES) 100 mg capsule    promethazine-codeine (PHENERGAN WITH CODEINE) 6 25-10 mg/5 mL syrup       Other    Lab test positive for detection of COVID-19 virus     Isolation  Supportive Mangt  RTC in 1 week         Relevant Medications    levofloxacin (LEVAQUIN) 500 mg tablet    benzonatate (TESSALON PERLES) 100 mg capsule    promethazine-codeine (PHENERGAN WITH CODEINE) 6 25-10 mg/5 mL syrup               Reason for visit is   Chief Complaint   Patient presents with    Virtual Regular Visit    Virtual Regular Visit        Encounter provider Dre Nogueira MD    Provider located at [facility]  [address] [address]  [address] [address] [phone]      Recent Visits  Date Type Provider Dept   04/28/21 Telephone Daxa Tian [facility] Primary [facility]   Showing recent visits within past 7 days and meeting all other requirements     Today's Visits  Date Type Provider Dept   05/03/21 Grzegorz Vu MD [facility]   Showing today's visits and meeting all other requirements     Future Appointments  No visits were found meeting these conditions  Showing future appointments within next 150 days and meeting all other requirements        The patient was identified by name and date of birth  Leti Brewer was informed that this is a telemedicine visit and that the visit is being conducted through [address] [place] [address] and patient was informed that this is not a secure, HIPAA-compliant platform  He agrees to proceed     My office door was closed  No one else was in the room    He acknowledged consent and understanding of privacy and security of the video platform  The patient has agreed to participate and understands they can discontinue the visit at any time  Patient is aware this is a billable service  Subjective  Ashlyn Nelson is a 77 y o  male is here for Virtual Visit as Below :        77 Y O Man is here for Virtual visit, He was tested Positive for Covid PCR and has few symptoms,    Past Medical History:   Diagnosis Date    Hypertension     Leukemia ([address] )     Osteoarthritis 9/26/2012       Past Surgical History:   Procedure Laterality Date    CARDIOVERSION (NON INVASIVE ONLY)  6/12/2020         NO PAST SURGERIES      NM ECHO TRANSESOPHAG R-T 2D W/PRB IMG ACQUISJ I&R  6/12/2020            Current Outpatient Medications   Medication Sig Dispense Refill    apixaban (ELIQUIS) 5 mg Take 1 tablet (5 mg total) by mouth 2 (two) times a day 180 tablet 3    b complex-C-E-zinc (STRESS FORMULA/ZINC) tablet Take 1 tablet by mouth daily 90 tablet 3    benzonatate (TESSALON PERLES) 100 mg capsule Take 1 capsule (100 mg total) by mouth 3 (three) times a day as needed for cough With meals/food 30 capsule 0    Bystolic 10 MG tablet take 1 tablet by mouth once daily 90 tablet 3    celecoxib (CeleBREX) 200 mg capsule Take 1 capsule (200 mg total) by mouth daily Please Stop Voltaren 30 capsule 3    digoxin (Lanoxin) 0 125 mg tablet Take 1 tablet (125 mcg total) by mouth 3 (three) times a week Take on Monday, Wednesday and Friday   (Patient not taking: Reported on 4/26/2021) 30 tablet 0    fluticasone (FLONASE) 50 mcg/act nasal spray 2 sprays into each nostril daily 1 Bottle 3    Ibrutinib (IMBRUVICA) 140 MG TABS Take 140 mg by mouth 2 (two) times a day 180 tablet 3    levofloxacin (LEVAQUIN) 500 mg tablet Take 1 tablet (500 mg total) by mouth every 24 hours for 10 days 10 tablet 0    promethazine-codeine (PHENERGAN WITH CODEINE) 6 25-10 mg/5 mL syrup Take 5 mL by mouth 2 (two) times a day as needed for cough 60 mL 0    tamsulosin (FLOMAX) 0 4 mg Take 1 capsule (0 4 mg total) by mouth daily with dinner (Patient taking differently: Take 0 4 mg by mouth daily with dinner "Takes when he needs it") 90 capsule 3     No current facility-administered medications for this visit  Allergies   Allergen Reactions    Tetanus Antitoxin Rash     Patient told by mother medication was given as a child    Tetanus Toxoid     Allopurinol Rash       Review of Systems   Constitutional: Negative for chills, fatigue and fever  HENT: Positive for congestion and postnasal drip  Negative for facial swelling, sore throat, trouble swallowing and voice change  Eyes: Negative for pain, discharge and visual disturbance  Respiratory: Positive for cough  Negative for shortness of breath and wheezing  Cardiovascular: Negative for chest pain, palpitations and leg swelling  Gastrointestinal: Negative for abdominal pain, blood in stool, constipation, diarrhea and nausea  Endocrine: Negative for polydipsia, polyphagia and polyuria  Genitourinary: Negative for difficulty urinating, hematuria and urgency  Musculoskeletal: Negative for arthralgias and myalgias  Skin: Negative for rash  Neurological: Negative for dizziness, tremors, weakness and headaches  Hematological: Negative for adenopathy  Does not bruise/bleed easily  Psychiatric/Behavioral: Negative for dysphoric mood, sleep disturbance and suicidal ideas  Video Exam    There were no vitals filed for this visit  Physical Exam  Constitutional:       General: He is not in acute distress  HENT:      Head: Normocephalic  Comments: Acute URI     Mouth/Throat:      Pharynx: No oropharyngeal exudate  Eyes:      General: No scleral icterus  Conjunctiva/sclera: Conjunctivae normal       Pupils: Pupils are equal, round, and reactive to light  Neck:      Musculoskeletal: Neck supple  Thyroid: No thyromegaly     Cardiovascular:      Rate and Rhythm: Normal rate and regular rhythm  Heart sounds: Normal heart sounds  No murmur  Pulmonary:      Effort: Pulmonary effort is normal  No respiratory distress  Breath sounds: Wheezing present  No rales  Abdominal:      General: Bowel sounds are normal  There is no distension  Palpations: Abdomen is soft  Tenderness: There is no abdominal tenderness  There is no guarding or rebound  Musculoskeletal:         General: No tenderness  Lymphadenopathy:      Cervical: No cervical adenopathy  Skin:     Coloration: Skin is not pale  Neurological:      Mental Status: He is alert and oriented to person, place, and time  I spent 20 minutes directly with the patient during this visit      VIRTUAL VISIT [address] [place] [address] acknowledges that he has consented to an online visit or consultation  He understands that the online visit is based solely on information provided by him, and that, in the absence of a face-to-face physical evaluation by the physician, the diagnosis he receives is both limited and provisional in terms of accuracy and completeness  This is not intended to replace a full medical face-to-face evaluation by the physician  Crystal Brink understands and accepts these terms  BMI Counseling: There is no height or weight on file to calculate BMI  The BMI is above normal  Nutrition recommendations include reducing portion sizes and decreasing overall calorie intake

## 2021-05-03 NOTE — PATIENT INSTRUCTIONS
Low Fat Diet   AMBULATORY CARE:   A low-fat diet  is an eating plan that is low in total fat, unhealthy fat, and cholesterol  You may need to follow a low-fat diet if you have trouble digesting or absorbing fat  You may also need to follow this diet if you have high cholesterol  You can also lower your cholesterol by increasing the amount of fiber in your diet  Soluble fiber is a type of fiber that helps to decrease cholesterol levels  Different types of fat in food:   · Limit unhealthy fats  A diet that is high in cholesterol, saturated fat, and trans fat may cause unhealthy cholesterol levels  Unhealthy cholesterol levels increase your risk of heart disease  ? Cholesterol:  Limit intake of cholesterol to less than 200 mg per day  Cholesterol is found in meat, eggs, and dairy  ? Saturated fat:  Limit saturated fat to less than 7% of your total daily calories  Ask your dietitian how many calories you need each day  Saturated fat is found in butter, cheese, ice cream, whole milk, and palm oil  Saturated fat is also found in meat, such as beef, pork, chicken skin, and processed meats  Processed meats include sausage, hot dogs, and bologna  ? Trans fat:  Avoid trans fat as much as possible  Trans fat is used in fried and baked foods  Foods that say trans fat free on the label may still have up to 0 5 grams of trans fat per serving  · Include healthy fats  Replace foods that are high in saturated and trans fat with foods high in healthy fats  This may help to decrease high cholesterol levels  ? Monounsaturated fats: These are found in avocados, nuts, and vegetable oils, such as olive, canola, and sunflower oil  ? Polyunsaturated fats: These can be found in vegetable oils, such as soybean or corn oil  Omega-3 fats can help to decrease the risk of heart disease  Omega-3 fats are found in fish, such as salmon, herring, trout, and tuna   Omega-3 fats can also be found in plant foods, such as walnuts, flaxseed, soybeans, and canola oil  Foods to limit or avoid:   · Grains:      ? Snacks that are made with partially hydrogenated oils, such as chips, regular crackers, and butter-flavored popcorn    ? High-fat baked goods, such as biscuits, croissants, doughnuts, pies, cookies, and pastries    · Dairy:      ? Whole milk, 2% milk, and yogurt and ice cream made with whole milk    ? Half and half creamer, heavy cream, and whipping cream    ? Cheese, cream cheese, and sour cream    · Meats and proteins:      ? High-fat cuts of meat (T-bone steak, regular hamburger, and ribs)    ? Fried meat, poultry (turkey and chicken), and fish    ? Poultry (chicken and turkey) with skin    ? Cold cuts (salami or bologna), hot dogs, merchant, and sausage    ? Whole eggs and egg yolks    · Vegetables and fruits with added fat:      ? Fried vegetables or vegetables in butter or high-fat sauces, such as cream or cheese sauces    ? Fried fruit or fruit served with butter or cream    · Fats:      ? Butter, stick margarine, and shortening    ? Coconut, palm oil, and palm kernel oil    Foods to include:   · Grains:      ? Whole-grain breads, cereals, pasta, and brown rice    ? Low-fat crackers and pretzels    · Vegetables and fruits:      ? Fresh, frozen, or canned vegetables (no salt or low-sodium)    ? Fresh, frozen, dried, or canned fruit (canned in light syrup or fruit juice)    ? Avocado    · Low-fat dairy products:      ? Nonfat (skim) or 1% milk    ? Nonfat or low-fat cheese, yogurt, and cottage cheese    · Meats and proteins:      ? Chicken or turkey with no skin    ? Baked or broiled fish    ? Lean beef and pork (loin, round, extra lean hamburger)    ? Beans and peas, unsalted nuts, soy products    ? Egg whites and substitutes    ? Seeds and nuts    · Fats:      ? Unsaturated oil, such as canola, olive, peanut, soybean, or sunflower oil    ? Soft or liquid margarine and vegetable oil spread    ?  Low-fat salad dressing    Other ways to decrease fat:   · Read food labels before you buy foods  Choose foods that have less than 30% of calories from fat  Choose low-fat or fat-free dairy products  Remember that fat free does not mean calorie free  These foods still contain calories, and too many calories can lead to weight gain  · Trim fat from meat and avoid fried food  Trim all visible fat from meat before you cook it  Remove the skin from poultry  Do not manjarrez meat, fish, or poultry  Bake, roast, boil, or broil these foods instead  Avoid fried foods  Eat a baked potato instead of [place] fries  Steam vegetables instead of sautéing them in butter  · Add less fat to foods  Use imitation merchant bits on salads and baked potatoes instead of regular merchant bits  Use fat-free or low-fat salad dressings instead of regular dressings  Use low-fat or nonfat butter-flavored topping instead of regular butter or margarine on popcorn and other foods  Ways to decrease fat in recipes:  Replace high-fat ingredients with low-fat or nonfat ones  This may cause baked goods to be drier than usual  You may need to use nonfat cooking spray on pans to prevent food from sticking  You also may need to change the amount of other ingredients, such as water, in the recipe  Try the following:  · Use low-fat or light margarine instead of regular margarine or shortening  · Use lean ground turkey breast or chicken, or lean ground beef (less than 5% fat) instead of hamburger  · Add 1 teaspoon of canola oil to 8 ounces of skim milk instead of using cream or half and half  · Use grated zucchini, carrots, or apples in breads instead of coconut  · Use blenderized, low-fat cottage cheese, plain tofu, or low-fat ricotta cheese instead of cream cheese  · Use 1 egg white and 1 teaspoon of canola oil, or use ¼ cup (2 ounces) of fat-free egg substitute instead of a whole egg       · Replace half of the oil that is called for in a recipe with applesauce when you bake  Use 3 tablespoons of cocoa powder and 1 tablespoon of canola oil instead of a square of baking chocolate  How to increase fiber:  Eat enough high-fiber foods to get 20 to 30 grams of fiber every day  Slowly increase your fiber intake to avoid stomach cramps, gas, and other problems  · Eat 3 ounces of whole-grain foods each day  An ounce is about 1 slice of bread  Eat whole-grain breads, such as whole-wheat bread  Whole wheat, whole-wheat flour, or other whole grains should be listed as the first ingredient on the food label  Replace white flour with whole-grain flour or use half of each in recipes  Whole-grain flour is heavier than white flour, so you may have to add more yeast or baking powder  · Eat a high-fiber cereal for breakfast   Oatmeal is a good source of soluble fiber  Look for cereals that have bran or fiber in the name  Choose whole-grain products, such as brown rice, barley, and whole-wheat pasta  · Eat more beans, peas, and lentils  For example, add beans to soups or salads  Eat at least 5 cups of fruits and vegetables each day  Eat fruits and vegetables with the peel because the peel is high in fiber  © Copyright [address] Information is for End User's use only and may not be sold, redistributed or otherwise used for commercial purposes  All illustrations and images included in CareNotes® are the copyrighted property of A D A M , Inc  or [address] [place] [address]  The above information is an  only  It is not intended as medical advice for individual conditions or treatments  Talk to your doctor, nurse or pharmacist before following any medical regimen to see if it is safe and effective for you  Heart Healthy Diet   AMBULATORY CARE:   A heart healthy diet  is an eating plan low in unhealthy fats and sodium (salt)  The plan is high in healthy fats and fiber   A heart healthy diet helps improve your cholesterol levels and lowers your risk for heart disease and stroke  A dietitian will teach you how to read and understand food labels  Heart healthy diet guidelines to follow:   · Choose foods that contain healthy fats  ? Unsaturated fats  include monounsaturated and polyunsaturated fats  Unsaturated fat is found in foods such as soybean, canola, olive, corn, and safflower oils  It is also found in soft tub margarine that is made with liquid vegetable oil  ? Omega-3 fat  is found in certain fish, such as salmon, tuna, and trout, and in walnuts and flaxseed  Eat fish high in omega-3 fats at least 2 times a week  · Get 20 to 30 grams of fiber each day  Fruits, vegetables, whole-grain foods, and legumes (cooked beans) are good sources of fiber  · Limit or do not have unhealthy fats  ? Cholesterol  is found in animal foods, such as eggs and lobster, and in dairy products made from whole milk  Limit cholesterol to less than 200 mg each day  ? Saturated fat  is found in meats, such as merchant and hamburger  It is also found in chicken or turkey skin, whole milk, and butter  Limit saturated fat to less than 7% of your total daily calories  ? Trans fat  is found in packaged foods, such as potato chips and cookies  It is also in hard margarine, some fried foods, and shortening  Do not eat foods that contain trans fats  · Limit sodium as directed  You may be told to limit sodium to 2,000 to 2,300 mg each day  Choose low-sodium or no-salt-added foods  Add little or no salt to food you prepare  Use herbs and spices in place of salt  Include the following in your heart healthy plan:  Ask your dietitian or healthcare provider how many servings to have from each of the following food groups:  · Grains:      ? Whole-wheat breads, cereals, and pastas, and brown rice    ? Low-fat, low-sodium crackers and chips    · Vegetables:      ? Broccoli, green beans, green peas, and spinach    ? Collards, kale, and lima beans    ?  Carrots, sweet potatoes, tomatoes, and peppers    ? Canned vegetables with no salt added    · Fruits:      ? Bananas, peaches, pears, and pineapple    ? Grapes, raisins, and dates    ? Oranges, tangerines, grapefruit, orange juice, and grapefruit juice    ? Apricots, mangoes, melons, and papaya    ? Raspberries and strawberries    ? Canned fruit with no added sugar    · Low-fat dairy:      ? Nonfat (skim) milk, 1% milk, and low-fat almond, cashew, or soy milks fortified with calcium    ? Low-fat cheese, regular or frozen yogurt, and cottage cheese    · Meats and proteins:      ? Lean cuts of beef and pork (loin, leg, round), skinless chicken and turkey    ? Legumes, soy products, egg whites, or nuts    Limit or do not include the following in your heart healthy plan:   · Unhealthy fats and oils:      ? Whole or 2% milk, cream cheese, sour cream, or cheese    ? High-fat cuts of beef (T-bone steaks, ribs), chicken or turkey with skin, and organ meats such as liver    ? Butter, stick margarine, shortening, and cooking oils such as coconut or palm oil    · Foods and liquids high in sodium:      ? Packaged foods, such as frozen dinners, cookies, macaroni and cheese, and cereals with more than 300 mg of sodium per serving    ? Vegetables with added sodium, such as instant potatoes, vegetables with added sauces, or regular canned vegetables    ? Cured or smoked meats, such as hot dogs, merchant, and sausage    ? High-sodium ketchup, barbecue sauce, salad dressing, pickles, olives, soy sauce, or miso    · Foods and liquids high in sugar:      ? Candy, cake, cookies, pies, or doughnuts    ? Soft drinks (soda), sports drinks, or sweetened tea    ? Canned or dry mixes for cakes, soups, sauces, or gravies    Other healthy heart guidelines:   · Do not smoke  Nicotine and other chemicals in cigarettes and cigars can cause lung and heart damage  Ask your healthcare provider for information if you currently smoke and need help to quit  E-cigarettes or smokeless tobacco still contain nicotine  Talk to your healthcare provider before you use these products  · Limit or do not drink alcohol as directed  Alcohol can damage your heart and raise your blood pressure  Your healthcare provider may give you specific daily and weekly limits  The general recommended limit is 1 drink a day for women 21 or older and for men 72 or older  Do not have more than 3 drinks in a day or 7 in a week  The recommended limit is 2 drinks a day for men 24to 59years of age  Do not have more than 4 drinks in a day or 14 in a week  A drink of alcohol is 12 ounces of beer, 5 ounces of wine, or 1½ ounces of liquor  · Exercise regularly  Exercise can help you maintain a healthy weight and improve your blood pressure and cholesterol levels  Regular exercise can also decrease your risk for heart problems  Ask your healthcare provider about the best exercise plan for you  Do not start an exercise program without asking your healthcare provider  Follow up with your doctor or cardiologist as directed:  Write down your questions so you remember to ask them during your visits  © Copyright [address] [place] [address] Drive Information is for End User's use only and may not be sold, redistributed or otherwise used for commercial purposes  All illustrations and images included in CareNotes® are the copyrighted property of A D A M , Inc  or [address] [place] [address]  The above information is an  only  It is not intended as medical advice for individual conditions or treatments  Talk to your doctor, nurse or pharmacist before following any medical regimen to see if it is safe and effective for you  Calorie Counting Diet   WHAT YOU NEED TO KNOW:   What is a calorie counting diet? It is a meal plan based on counting calories each day to reach a healthy body weight  You will need to eat fewer calories if you are trying to lose weight   Weight loss may decrease your risk for certain health problems or improve your health if you have health problems  Some of these health problems include heart disease, high blood pressure, and diabetes  What foods should I avoid? Your dietitian will tell you if you need to avoid certain foods based on your body weight and health condition  You may need to avoid high-fat foods if you are at risk for or have heart disease  You may need to eat fewer foods from the breads and starches food group if you have diabetes  How many calories are in foods? The following is a list of foods and drinks with the approximate number of calories in each  Check the food label to find the exact number of calories  A dietitian can tell you how many calories you should have from each food group each day  · Carbohydrate:      ? ½ of a 3-inch bagel, 1 slice of bread, or ½ of a hamburger bun or hot dog bun (80)    ? 1 (8-inch) flour tortilla or ½ cup of cooked rice (100)    ? 1 (6-inch) corn tortilla (80)    ? 1 (6-inch) pancake or 1 cup of bran flakes cereal (110)    ? ½ cup of cooked cereal (80)    ? ½ cup of cooked pasta (85)    ? 1 ounce of pretzels (100)    ? 3 cups of air-popped popcorn without butter or oil (80)    · Dairy:      ? 1 cup of skim or 1% milk (90)    ? 1 cup of 2% milk (120)    ? 1 cup of whole milk (160)    ? 1 cup of 2% chocolate milk (220)    ? 1 ounce of low-fat cheese with 3 grams of fat per ounce (70)    ? 1 ounce of cheddar cheese (114)    ? ½ cup of 1% fat cottage cheese (80)    ? 1 cup of plain or sugar-free, fat-free yogurt (90)    · Protein foods:      ? 3 ounces of fish (not breaded or fried) (95)    ? 3 ounces of breaded, fried fish (195)    ? ¾ cup of tuna canned in water (105)    ? 3 ounces of chicken breast without skin (105)    ? 1 fried chicken breast with skin (350)    ? ¼ cup of fat free egg substitute (40)    ? 1 large egg (75)    ? 3 ounces of lean beef or pork (165)    ? 3 ounces of fried pork chop or ham (185)    ?  ½ cup of cooked dried beans, such as kidney, emanuel, lentils, or navy (115)    ? 3 ounces of bologna or lunch meat (225)    ? 2 links of breakfast sausage (140)    · Vegetables:      ? ½ cup of sliced mushrooms (10)    ? 1 cup of salad greens, such as lettuce, spinach, or collins (15)    ? ½ cup of steamed asparagus (20)    ? ½ cup of cooked summer squash, zucchini squash, or green or wax beans (25)    ? 1 cup of broccoli or cauliflower florets, or 1 medium tomato (25)    ? 1 large raw carrot or ½ cup of cooked carrots (40)    ? ? of a medium cucumber or 1 stalk of celery (5)    ? 1 small baked potato (160)    ? 1 cup of breaded, fried vegetables (230)    · Fruit:      ? 1 (6-inch) banana (55)     ? ½ of a 4-inch grapefruit (55)    ? 15 grapes (60)    ? 1 medium orange or apple (70)    ? 1 large peach (65)    ? 1 cup of fresh pineapple chunks (75)    ? 1 cup of melon cubes (50)    ? 1¼ cups of whole strawberries (45)    ? ½ cup of fruit canned in juice (55)    ? ½ cup of fruit canned in heavy syrup (110)    ? ? cup of raisins (130)    ? ½ cup of unsweetened fruit juice (60)    ? ½ cup of grape, cranberry, or prune juice (90)    · Fat:      ? 10 peanuts or 2 teaspoons of peanut butter (55)    ? 2 tablespoons of avocado or 1 tablespoon of regular salad dressing (45)    ? 2 slices of merchant (90)    ? 1 teaspoon of oil, such as safflower, canola, corn, or olive oil (45)    ? 2 teaspoons of low-fat margarine, or 1 tablespoon of low-fat mayonnaise (50)    ? 1 teaspoon of regular margarine (40)    ? 1 tablespoon of regular mayonnaise (135)    ? 1 tablespoon of cream cheese or 2 tablespoons of low-fat cream cheese (45)    ? 2 tablespoons of vegetable shortening (215)    · Dessert and sweets:      ? 8 animal crackers or 5 vanilla wafers (80)    ? 1 frozen fruit juice bar (80)    ? ½ cup of ice milk or low-fat frozen yogurt (90)    ? ½ cup of sherbet or sorbet (125)    ? ½ cup of sugar-free pudding or custard (60)    ?  ½ cup of ice cream (140)    ? ½ cup of pudding or custard (175)    ? 1 (2-inch) square chocolate brownie (185)    · Combination foods:      ? Bean burrito made with an 8-inch tortilla, without cheese (275)    ? Chicken breast sandwich with lettuce and tomato (325)    ? 1 cup of chicken noodle soup (60)    ? 1 beef taco (175)    ? Regular hamburger with lettuce and tomato (310)    ? Regular cheeseburger with lettuce and tomato (410)     ? ¼ of a 12-inch cheese pizza (280)    ? Fried fish sandwich with lettuce and tomato (425)    ? Hot dog and bun (275)    ? 1½ cups of macaroni and cheese (310)    ? Taco salad with a fried tortilla shell (870)    · Low-calorie foods:      ? 1 tablespoon of ketchup or 1 tablespoon of fat free sour cream (15)    ? 1 teaspoon of mustard (5)    ? ¼ cup of salsa (20)    ? 1 large dill pickle (15)    ? 1 tablespoon of fat free salad dressing (10)    ? 2 teaspoons of low-sugar, light jam or jelly, or 1 tablespoon of sugar-free syrup (15)    ? 1 sugar-free popsicle (15)    ? 1 cup of club soda, seltzer water, or diet soda (0)    CARE AGREEMENT:   You have the right to help plan your care  Discuss treatment options with your healthcare provider to decide what care you want to receive  You always have the right to refuse treatment  The above information is an  only  It is not intended as medical advice for individual conditions or treatments  Talk to your doctor, nurse or pharmacist before following any medical regimen to see if it is safe and effective for you  © Copyright [address] Information is for End User's use only and may not be sold, redistributed or otherwise used for commercial purposes   All illustrations and images included in CareNotes® are the copyrighted property of A D A M , Inc  or [facility]

## 2021-05-13 ENCOUNTER — OFFICE VISIT (OUTPATIENT)
Dept: FAMILY MEDICINE CLINIC | Facility: CLINIC | Age: 76
End: 2021-05-13
Payer: MEDICARE

## 2021-05-13 VITALS
TEMPERATURE: 97.7 F | DIASTOLIC BLOOD PRESSURE: 82 MMHG | HEART RATE: 80 BPM | BODY MASS INDEX: 24.81 KG/M2 | HEIGHT: 66 IN | RESPIRATION RATE: 14 BRPM | OXYGEN SATURATION: 97 % | SYSTOLIC BLOOD PRESSURE: 130 MMHG | WEIGHT: 154.4 LBS

## 2021-05-13 DIAGNOSIS — C91.10 CHRONIC LYMPHOCYTIC LEUKEMIA (HCC): ICD-10-CM

## 2021-05-13 DIAGNOSIS — Z00.01 ENCOUNTER FOR GENERAL ADULT MEDICAL EXAMINATION WITH ABNORMAL FINDINGS: Primary | ICD-10-CM

## 2021-05-13 DIAGNOSIS — I27.20 MILD PULMONARY HYPERTENSION (HCC): ICD-10-CM

## 2021-05-13 DIAGNOSIS — I48.20 CHRONIC ATRIAL FIBRILLATION (HCC): ICD-10-CM

## 2021-05-13 DIAGNOSIS — E78.49 OTHER HYPERLIPIDEMIA: ICD-10-CM

## 2021-05-13 DIAGNOSIS — Z12.11 SCREEN FOR COLON CANCER: ICD-10-CM

## 2021-05-13 DIAGNOSIS — I48.91 RAPID ATRIAL FIBRILLATION (HCC): ICD-10-CM

## 2021-05-13 DIAGNOSIS — I10 ESSENTIAL HYPERTENSION, MALIGNANT: ICD-10-CM

## 2021-05-13 DIAGNOSIS — I73.9 PERIPHERAL VASCULAR DISEASE (HCC): ICD-10-CM

## 2021-05-13 DIAGNOSIS — N40.0 ENLARGED PROSTATE: ICD-10-CM

## 2021-05-13 PROCEDURE — G0438 PPPS, INITIAL VISIT: HCPCS | Performed by: INTERNAL MEDICINE

## 2021-05-13 PROCEDURE — 1123F ACP DISCUSS/DSCN MKR DOCD: CPT | Performed by: INTERNAL MEDICINE

## 2021-05-13 PROCEDURE — 99214 OFFICE O/P EST MOD 30 MIN: CPT | Performed by: INTERNAL MEDICINE

## 2021-05-13 PROCEDURE — 93000 ELECTROCARDIOGRAM COMPLETE: CPT | Performed by: INTERNAL MEDICINE

## 2021-05-13 RX ORDER — NEBIVOLOL 10 MG/1
10 TABLET ORAL DAILY
Qty: 90 TABLET | Refills: 3 | Status: SHIPPED | OUTPATIENT
Start: 2021-05-13 | End: 2021-12-14 | Stop reason: SDUPTHER

## 2021-05-13 RX ORDER — IBRUTINIB 140 MG/1
140 CAPSULE ORAL 2 TIMES DAILY
COMMUNITY
Start: 2021-05-07 | End: 2022-07-19 | Stop reason: SDUPTHER

## 2021-05-13 RX ORDER — DIGOXIN 125 MCG
125 TABLET ORAL 3 TIMES WEEKLY
Qty: 45 TABLET | Refills: 3 | Status: SHIPPED | OUTPATIENT
Start: 2021-05-14 | End: 2022-07-05

## 2021-05-13 RX ORDER — TAMSULOSIN HYDROCHLORIDE 0.4 MG/1
0.4 CAPSULE ORAL
Qty: 90 CAPSULE | Refills: 3 | Status: SHIPPED | OUTPATIENT
Start: 2021-05-13 | End: 2022-07-19 | Stop reason: SDUPTHER

## 2021-05-13 NOTE — PROGRESS NOTES
Assessment/Plan:         Diagnoses and all orders for this visit:    Encounter for general adult medical examination with abnormal findings; done in Detail  RTC in 3 mos w :  -     Comprehensive metabolic panel; Future  -     CBC and differential; Future  -     Magnesium; Future  -     Lipid panel; Future  -     UA (URINE) with reflex to Scope; Future    Screen for colon cancer  -     Ambulatory referral for colonoscopy; Future    Essential hypertension, malignant; continue and Renew :  -     nebivolol (Bystolic) 10 mg tablet; Take 1 tablet (10 mg total) by mouth daily  RTC in 3mos w :  -     Comprehensive metabolic panel; Future  -     CBC and differential; Future  -     Magnesium; Future  -     UA (URINE) with reflex to Scope; Future    Rapid atrial fibrillation ([address] ); Re Start :  -     digoxin (Lanoxin) 0 125 mg tablet; Take 1 tablet (125 mcg total) by mouth 3 (three) times a week Take on Monday, Wednesday and Friday  -     apixaban (ELIQUIS) 5 mg; Take 1 tablet (5 mg total) by mouth 2 (two) times a day  RTC in 2-3 mos w H Monitor,echo    Enlarged prostate  -     tamsulosin (Flomax) 0 4 mg; Take 1 capsule (0 4 mg total) by mouth daily with dinner    Chronic lymphocytic leukemia ([facility] [address] ); Stable  FU w Hematology    Mild pulmonary hypertension ([address] ); stable  Continue same    Peripheral vascular disease (HCC)    Chronic atrial fibrillation (HCC)  -     POCT ECG  -     Holter monitor - 48 hour; Future  -     Echo complete with contrast if indicated; Future    Other hyperlipidemia; Life style Mod  RTC in 3mos w :  -     Lipid panel; Future    Other orders  -     Imbruvica 140 MG CAPS; Take 140 mg by mouth 2 (two) times a day        Subjective:      Patient ID: Lindsay Almaguer is a 77 y o  male  77 Y O  Man is here for AWV and Regular Check up, He feels Better, recent Blood work and med list reviewed,        The following portions of the patient's history were reviewed and updated as appropriate: allergies, current medications, past medical history, past social history, past surgical history and problem list     Review of Systems   Constitutional: Negative for chills, fatigue and fever  HENT: Negative for congestion, facial swelling, sore throat, trouble swallowing and voice change  Eyes: Negative for pain, discharge and visual disturbance  Respiratory: Negative for cough, shortness of breath and wheezing  Cardiovascular: Negative for chest pain, palpitations and leg swelling  Gastrointestinal: Negative for abdominal pain, blood in stool, constipation, diarrhea and nausea  Endocrine: Negative for polydipsia, polyphagia and polyuria  Genitourinary: Negative for difficulty urinating, hematuria and urgency  Musculoskeletal: Negative for arthralgias and myalgias  Skin: Negative for rash  Neurological: Negative for dizziness, tremors, weakness and headaches  Hematological: Negative for adenopathy  Does not bruise/bleed easily  Psychiatric/Behavioral: Negative for dysphoric mood, sleep disturbance and suicidal ideas  Objective:      /82 (BP Location: Left arm, Patient Position: Sitting, Cuff Size: Standard)   Pulse 80   Temp 97 7 °F (36 5 °C) (Tympanic)   Resp 14   Ht 5' 6" (1 676 m)   Wt 70 kg (154 lb 6 4 oz)   SpO2 97%   BMI 24 92 kg/m²          Physical Exam  Constitutional:       General: He is not in acute distress  HENT:      Head: Normocephalic  Mouth/Throat:      Pharynx: No oropharyngeal exudate  Eyes:      General: No scleral icterus  Conjunctiva/sclera: Conjunctivae normal       Pupils: Pupils are equal, round, and reactive to light  Neck:      Musculoskeletal: Neck supple  Thyroid: No thyromegaly  Cardiovascular:      Rate and Rhythm: Normal rate  Rhythm irregular  Heart sounds: Normal heart sounds  No murmur  Pulmonary:      Effort: Pulmonary effort is normal  No respiratory distress  Breath sounds: Normal breath sounds   No wheezing or rales    Abdominal:      General: Bowel sounds are normal  There is no distension  Palpations: Abdomen is soft  Tenderness: There is no abdominal tenderness  There is no guarding or rebound  Musculoskeletal:         General: No tenderness  Lymphadenopathy:      Cervical: No cervical adenopathy  Skin:     Coloration: Skin is not pale  Findings: No rash  Neurological:      Mental Status: He is alert and oriented to person, place, and time  Sensory: No sensory deficit  Motor: No weakness

## 2021-05-13 NOTE — PROGRESS NOTES
Assessment and Plan:     Problem List Items Addressed This Visit     None      Visit Diagnoses     Screen for colon cancer    -  Primary           Preventive health issues were discussed with patient, and age appropriate screening tests were ordered as noted in patient's After Visit Summary  Personalized health advice and appropriate referrals for health education or preventive services given if needed, as noted in patient's After Visit Summary       History of Present Illness:     Patient presents for Medicare Annual Wellness visit    Patient Care Team:  Briseida Manuel MD as PCP - General (Internal Medicine)     Problem List:     Patient Active Problem List   Diagnosis    Hypertension    Hyperlipidemia    Chronic lymphocytic leukemia ([facility] )    Meningioma ([facility] )    Osteoarthritis    Persistent atrial fibrillation ([facility] )    Mild pulmonary hypertension ([facility] )    Benign essential HTN    Benign non-nodular prostatic hyperplasia with lower urinary tract symptoms    Chest pain    Increased frequency of urination    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    Moderate mitral valve regurgitation    Lab test positive for detection of COVID-19 virus    Acute URI      Past Medical and Surgical History:     Past Medical History:   Diagnosis Date    Hypertension     Leukemia ([facility] )     Osteoarthritis 9/26/2012     Past Surgical History:   Procedure Laterality Date    CARDIOVERSION (NON INVASIVE ONLY)  6/12/2020         NO PAST SURGERIES      VA ECHO TRANSESOPHAG R-T 2D W/PRB IMG ACQUISJ I&R  6/12/2020           Family History:     Family History   Problem Relation Age of Onset    Diabetes Mother     Heart disease Father       Social History:     E-Cigarette/Vaping    E-Cigarette Use Never User      E-Cigarette/Vaping Substances    Nicotine No     THC No     CBD No     Flavoring No      Social History     Socioeconomic History    Marital status: /Civil Union     Spouse name: None    Number of children: None    Years of education: None    Highest education level: None   Occupational History    None   Social Needs    Financial resource strain: Not hard at all   [place] insecurity     Worry: Never true     Inability: Never true   [employer] needs     Medical: No     Non-medical: No   Tobacco Use    Smoking status: Never Smoker    Smokeless tobacco: Never Used    Tobacco comment: No passive smoke exposure   Substance and Sexual Activity    Alcohol use: Yes     Frequency: Monthly or less    Drug use: No    Sexual activity: Yes     Partners: Female   Lifestyle    Physical activity     Days per week: Patient refused     Minutes per session: Patient refused    Stress: Not at all   Relationships    Social connections     Talks on phone: Patient refused     Gets together: Patient refused     Attends Religion service: Patient refused     Active member of club or organization: Patient refused     Attends meetings of clubs or organizations: Patient refused     Relationship status: Patient refused    Intimate partner violence     Fear of current or ex partner: No     Emotionally abused: No     Physically abused: No     Forced sexual activity: No   Other Topics Concern    None   Social History Narrative    None      Medications and Allergies:     Current Outpatient Medications   Medication Sig Dispense Refill    apixaban (ELIQUIS) 5 mg Take 1 tablet (5 mg total) by mouth 2 (two) times a day 180 tablet 3    b complex-C-E-zinc (STRESS FORMULA/ZINC) tablet Take 1 tablet by mouth daily 90 tablet 3    benzonatate (TESSALON PERLES) 100 mg capsule Take 1 capsule (100 mg total) by mouth 3 (three) times a day as needed for cough With meals/food 30 capsule 0    Bystolic 10 MG tablet take 1 tablet by mouth once daily 90 tablet 3    celecoxib (CeleBREX) 200 mg capsule Take 1 capsule (200 mg total) by mouth daily Please Stop Voltaren 30 capsule 3    digoxin (Lanoxin) 0 125 mg tablet Take 1 tablet (125 mcg total) by mouth 3 (three) times a week Take on Monday, Wednesday and Friday  30 tablet 0    fluticasone (FLONASE) 50 mcg/act nasal spray 2 sprays into each nostril daily 1 Bottle 3    Ibrutinib (IMBRUVICA) 140 MG TABS Take 140 mg by mouth 2 (two) times a day 180 tablet 3    levofloxacin (LEVAQUIN) 500 mg tablet Take 1 tablet (500 mg total) by mouth every 24 hours for 10 days 10 tablet 0    promethazine-codeine (PHENERGAN WITH CODEINE) 6 25-10 mg/5 mL syrup Take 5 mL by mouth 2 (two) times a day as needed for cough 60 mL 0    tamsulosin (FLOMAX) 0 4 mg Take 1 capsule (0 4 mg total) by mouth daily with dinner (Patient taking differently: Take 0 4 mg by mouth daily with dinner "Takes when he needs it") 90 capsule 3     No current facility-administered medications for this visit  Allergies   Allergen Reactions    Tetanus Antitoxin Rash     Patient told by mother medication was given as a child    Tetanus Toxoid     Allopurinol Rash      Immunizations:     Immunization History   Administered Date(s) Administered    Fluzone Split Quad 0 5 mL 10/10/2017    INFLUENZA 11/01/2013, 10/20/2015, 10/03/2016    Influenza Split 10/16/2013    Influenza Split High Dose Preservative Free IM 10/03/2016    Influenza, high dose seasonal 0 7 mL 10/05/2018    Influenza, seasonal, injectable 10/20/2015    Pneumococcal Polysaccharide PPV23 10/01/2002, 10/16/2013      Health Maintenance:         Topic Date Due    Colonoscopy Surveillance  11/07/2018    Colorectal Cancer Screening  11/07/2023    Hepatitis C Screening  Completed         Topic Date Due    COVID-19 Vaccine (1) Never done      Medicare Health Risk Assessment:     Ht 5' 6" (1 676 m)   BMI 25 26 kg/m²      Mayo Ochoa is here for his Subsequent Wellness visit  Last Medicare Wellness visit information reviewed, patient interviewed, no change since last AWV  Health Risk Assessment:   Patient rates overall health as good   Patient feels that their physical health rating is same  Eyesight was rated as same  Hearing was rated as slightly worse  Patient feels that their emotional and mental health rating is same  Patients states they are never, rarely angry  Patient states they are never, rarely unusually tired/fatigued  Pain experienced in the last 7 days has been a lot  Patient's pain rating has been 1/10  Patient states that he has experienced no weight loss or gain in last 6 months  Depression Screening:   PHQ-2 Score: 0      Fall Risk Screening: In the past year, patient has experienced: no history of falling in past year      Home Safety:  Patient does not have trouble with stairs inside or outside of their home  Patient has working smoke alarms and has working carbon monoxide detector  Home safety hazards include: none  Nutrition:   Current diet is Regular  Medications:   Patient is not currently taking any over-the-counter supplements  Patient is able to manage medications  Activities of Daily Living (ADLs)/Instrumental Activities of Daily Living (IADLs):   Walk and transfer into and out of bed and chair?: Yes  Dress and groom yourself?: Yes    Bathe or shower yourself?: Yes    Feed yourself? Yes  Do your laundry/housekeeping?: Yes  Manage your money, pay your bills and track your expenses?: Yes  Make your own meals?: Yes    Do your own shopping?: Yes    Previous Hospitalizations:   Any hospitalizations or ED visits within the last 12 months?: No      Advance Care Planning:   Living will: Yes    Durable POA for healthcare:  Yes    Advanced directive: Yes    Advanced directive counseling given: Yes    Five wishes given: Yes    Patient declined ACP directive: Yes    End of Life Decisions reviewed with patient: Yes    Provider agrees with end of life decisions: Yes      PREVENTIVE SCREENINGS      Cardiovascular Screening:    General: Screening Not Indicated, History Lipid Disorder and Risks and Benefits Discussed      Diabetes Screening: General: Screening Current and Risks and Benefits Discussed      Colorectal Cancer Screening:     General: Screening Current      Prostate Cancer Screening:    General: Risks and Benefits Discussed      Osteoporosis Screening:    General: Risks and Benefits Discussed      Abdominal Aortic Aneurysm (AAA) Screening:    Risk factors include: age between 73-69 yo        General: Risks and Benefits Discussed      Lung Cancer Screening:     General: Screening Not Indicated and Risks and Benefits Discussed      Hepatitis C Screening:    General: Screening Current and Risks and Benefits Discussed    Screening, Brief Intervention, and Referral to Treatment (SBIRT)    Screening      AUDIT-C Screenin) How often did you have a drink containing alcohol in the past year? monthly or less    Single Item Drug Screening:  How often have you used an illegal drug (including marijuana) or a prescription medication for non-medical reasons in the past year? never    Single Item Drug Screen Score: 0  Interpretation: Negative screen for possible drug use disorder    Other Counseling Topics:   Car/seat belt/driving safety, skin self-exam, sunscreen and calcium and vitamin D intake and regular weightbearing exercise         Ileana Hercules MD

## 2021-08-09 ENCOUNTER — HOSPITAL ENCOUNTER (OUTPATIENT)
Dept: NON INVASIVE DIAGNOSTICS | Facility: HOSPITAL | Age: 76
Discharge: HOME | End: 2021-08-09
Payer: MEDICARE

## 2021-08-09 DIAGNOSIS — I48.20 CHRONIC ATRIAL FIBRILLATION (HCC): ICD-10-CM

## 2021-08-09 PROCEDURE — 93306 TTE W/DOPPLER COMPLETE: CPT

## 2021-08-09 PROCEDURE — 93226 XTRNL ECG REC<48 HR SCAN A/R: CPT

## 2021-08-09 PROCEDURE — 93225 XTRNL ECG REC<48 HRS REC: CPT

## 2021-08-10 ENCOUNTER — TELEPHONE (OUTPATIENT)
Dept: FAMILY MEDICINE CLINIC | Facility: CLINIC | Age: 76
End: 2021-08-10

## 2021-08-13 ENCOUNTER — TELEPHONE (OUTPATIENT)
Dept: FAMILY MEDICINE CLINIC | Facility: CLINIC | Age: 76
End: 2021-08-13

## 2021-08-17 PROCEDURE — 93227 XTRNL ECG REC<48 HR R&I: CPT

## 2021-09-07 ENCOUNTER — OFFICE VISIT (OUTPATIENT)
Dept: FAMILY MEDICINE CLINIC | Facility: CLINIC | Age: 76
End: 2021-09-07
Payer: MEDICARE

## 2021-09-07 VITALS
DIASTOLIC BLOOD PRESSURE: 84 MMHG | SYSTOLIC BLOOD PRESSURE: 126 MMHG | HEIGHT: 66 IN | OXYGEN SATURATION: 98 % | RESPIRATION RATE: 14 BRPM | TEMPERATURE: 97.6 F | WEIGHT: 160 LBS | BODY MASS INDEX: 25.71 KG/M2 | HEART RATE: 71 BPM

## 2021-09-07 DIAGNOSIS — I48.20 CHRONIC ATRIAL FIBRILLATION (HCC): ICD-10-CM

## 2021-09-07 DIAGNOSIS — I27.20 MILD PULMONARY HYPERTENSION (HCC): ICD-10-CM

## 2021-09-07 DIAGNOSIS — Z23 NEED FOR PNEUMOCOCCAL VACCINATION: ICD-10-CM

## 2021-09-07 DIAGNOSIS — I50.42 CHRONIC COMBINED SYSTOLIC AND DIASTOLIC HEART FAILURE (HCC): ICD-10-CM

## 2021-09-07 DIAGNOSIS — E78.49 OTHER HYPERLIPIDEMIA: Primary | ICD-10-CM

## 2021-09-07 DIAGNOSIS — E53.8 LOW VITAMIN B12 LEVEL: ICD-10-CM

## 2021-09-07 DIAGNOSIS — Z23 FLU VACCINE NEED: ICD-10-CM

## 2021-09-07 DIAGNOSIS — M81.8 IDIOPATHIC OSTEOPOROSIS: ICD-10-CM

## 2021-09-07 PROCEDURE — 90662 IIV NO PRSV INCREASED AG IM: CPT

## 2021-09-07 PROCEDURE — 99214 OFFICE O/P EST MOD 30 MIN: CPT | Performed by: INTERNAL MEDICINE

## 2021-09-07 PROCEDURE — G0009 ADMIN PNEUMOCOCCAL VACCINE: HCPCS

## 2021-09-07 PROCEDURE — G0008 ADMIN INFLUENZA VIRUS VAC: HCPCS

## 2021-09-07 PROCEDURE — 90670 PCV13 VACCINE IM: CPT

## 2021-09-07 RX ORDER — VALSARTAN 40 MG/1
40 TABLET ORAL DAILY
Qty: 180 TABLET | Refills: 3 | Status: SHIPPED | OUTPATIENT
Start: 2021-09-07 | End: 2022-07-19 | Stop reason: SDUPTHER

## 2021-09-07 NOTE — PROGRESS NOTES
Assessment/Plan:         Diagnoses and all orders for this visit:    Other hyperlipidemia; life style mod  rtc in 3 mos w ;  -     Comprehensive metabolic panel; Future  -     CBC and differential; Future  -     Magnesium; Future  -     Lipid panel; Future  -     TSH, 3rd generation; Future  -     UA (URINE) with reflex to Scope; Future    Need for pneumococcal vaccination  -     PNEUMOCOCCAL CONJUGATE VACCINE 13-VALENT GREATER THAN 6 MONTHS    Mild pulmonary hypertension ([facility] ); stable continue same    Chronic [facility] fibrillation ([facility] )  -     Ambulatory referral to Cardiology; Future  -     valsartan (DIOVAN) 40 mg tablet; Take 1 tablet (40 mg total) by mouth daily  -     Digoxin level; Future    Low vitamin B12 level  -     Vitamin B12; Future    Idiopathic osteoporosis  -     Vitamin D 25 hydroxy; Future    Flu vaccine need  -     influenza vaccine, high-dose, PF 0 7 mL (FLUZONE HIGH-DOSE)    Chronic combined systolic and diastolic heart failure ([facility] )  -     Ambulatory referral to Cardiology; Future  -     valsartan (DIOVAN) 40 mg tablet; Take 1 tablet (40 mg total) by mouth daily  -     Comprehensive metabolic panel; Future  -     CBC and differential; Future  -     Magnesium; Future  -     Lipid panel; Future  -     TSH, 3rd generation; Future  -     UA (URINE) with reflex to Scope; Future  -     Digoxin level; Future        Subjective:      Patient ID: Radha Polo is a 77 y o  male  77 y o man is here for regular check up, he feels ok, recent blood work and med list reviewed w pt,  The following portions of the patient's history were reviewed and updated as appropriate: allergies, current medications, past family history, past medical history, past social history, past surgical history and problem list     Review of Systems   Constitutional: Negative for chills, fatigue and fever  HENT: Negative for congestion, facial swelling, sore throat, trouble swallowing and voice change      Eyes: Negative for pain, discharge and visual disturbance  Respiratory: Negative for cough, shortness of breath and wheezing  Cardiovascular: Negative for chest pain, palpitations and leg swelling  Gastrointestinal: Negative for abdominal pain, blood in stool, constipation, diarrhea and nausea  Endocrine: Negative for polydipsia, polyphagia and polyuria  Genitourinary: Negative for difficulty urinating, hematuria and urgency  Musculoskeletal: Negative for arthralgias and myalgias  Skin: Negative for rash  Neurological: Negative for dizziness, tremors, weakness and headaches  Hematological: Negative for adenopathy  Does not bruise/bleed easily  Psychiatric/Behavioral: Negative for dysphoric mood, sleep disturbance and suicidal ideas  Objective:      /84 (BP Location: Left arm, Patient Position: Sitting, Cuff Size: Standard)   Pulse 71   Temp 97 6 °F (36 4 °C) (Tympanic)   Resp 14   Ht 5' 6" (1 676 m)   Wt 72 6 kg (160 lb)   SpO2 98%   BMI 25 82 kg/m²          Physical Exam  Constitutional:       General: He is not in acute distress  HENT:      Head: Normocephalic  Mouth/Throat:      Pharynx: No oropharyngeal exudate  Eyes:      General: No scleral icterus  Conjunctiva/sclera: Conjunctivae normal       Pupils: Pupils are equal, round, and reactive to light  Neck:      Thyroid: No thyromegaly  Cardiovascular:      Rate and Rhythm: Normal rate  Rhythm irregular  Heart sounds: Normal heart sounds  No murmur heard  Pulmonary:      Effort: Pulmonary effort is normal  No respiratory distress  Breath sounds: Normal breath sounds  No wheezing or rales  Abdominal:      General: Bowel sounds are normal  There is no distension  Palpations: Abdomen is soft  Tenderness: There is no abdominal tenderness  There is no guarding or rebound  Musculoskeletal:         General: No tenderness  Cervical back: Neck supple     Lymphadenopathy:      Cervical: No cervical adenopathy  Skin:     Coloration: Skin is not pale  Neurological:      Mental Status: He is alert and oriented to person, place, and time  Sensory: No sensory deficit  Motor: No weakness

## 2021-12-14 DIAGNOSIS — I10 ESSENTIAL HYPERTENSION, MALIGNANT: ICD-10-CM

## 2021-12-14 RX ORDER — NEBIVOLOL 10 MG/1
10 TABLET ORAL DAILY
Qty: 90 TABLET | Refills: 3 | Status: SHIPPED | OUTPATIENT
Start: 2021-12-14 | End: 2022-07-19 | Stop reason: SDUPTHER

## 2022-01-25 DIAGNOSIS — M19.90 ARTHRITIS: Primary | ICD-10-CM

## 2022-06-14 ENCOUNTER — LAB REQUISITION (OUTPATIENT)
Dept: LAB | Facility: HOSPITAL | Age: 77
End: 2022-06-14
Payer: MEDICARE

## 2022-06-14 DIAGNOSIS — L03.039 CELLULITIS OF UNSPECIFIED TOE: ICD-10-CM

## 2022-06-14 PROCEDURE — 87205 SMEAR GRAM STAIN: CPT | Performed by: PODIATRIST

## 2022-06-14 PROCEDURE — 87070 CULTURE OTHR SPECIMN AEROBIC: CPT | Performed by: PODIATRIST

## 2022-06-16 LAB
BACTERIA WND AEROBE CULT: NORMAL
GRAM STN SPEC: NORMAL
GRAM STN SPEC: NORMAL

## 2022-07-05 DIAGNOSIS — I48.91 RAPID ATRIAL FIBRILLATION (HCC): ICD-10-CM

## 2022-07-05 RX ORDER — DIGOXIN 125 MCG
TABLET ORAL
Qty: 45 TABLET | Refills: 3 | Status: SHIPPED | OUTPATIENT
Start: 2022-07-05 | End: 2022-07-19 | Stop reason: SDUPTHER

## 2022-07-12 DIAGNOSIS — I48.91 RAPID ATRIAL FIBRILLATION (HCC): Primary | ICD-10-CM

## 2022-07-12 DIAGNOSIS — N40.0 ENLARGED PROSTATE: ICD-10-CM

## 2022-07-19 ENCOUNTER — OFFICE VISIT (OUTPATIENT)
Dept: FAMILY MEDICINE CLINIC | Facility: CLINIC | Age: 77
End: 2022-07-19
Payer: MEDICARE

## 2022-07-19 VITALS
DIASTOLIC BLOOD PRESSURE: 82 MMHG | WEIGHT: 159.8 LBS | TEMPERATURE: 96.8 F | SYSTOLIC BLOOD PRESSURE: 158 MMHG | RESPIRATION RATE: 15 BRPM | HEART RATE: 113 BPM | HEIGHT: 66 IN | BODY MASS INDEX: 25.68 KG/M2 | OXYGEN SATURATION: 98 %

## 2022-07-19 DIAGNOSIS — Z12.11 SCREEN FOR COLON CANCER: ICD-10-CM

## 2022-07-19 DIAGNOSIS — I48.20 CHRONIC ATRIAL FIBRILLATION (HCC): ICD-10-CM

## 2022-07-19 DIAGNOSIS — Z00.01 ENCOUNTER FOR GENERAL ADULT MEDICAL EXAMINATION WITH ABNORMAL FINDINGS: Primary | ICD-10-CM

## 2022-07-19 DIAGNOSIS — I50.42 CHRONIC COMBINED SYSTOLIC AND DIASTOLIC HEART FAILURE (HCC): ICD-10-CM

## 2022-07-19 DIAGNOSIS — C91.10 CHRONIC LYMPHOCYTIC LEUKEMIA (HCC): ICD-10-CM

## 2022-07-19 DIAGNOSIS — N40.0 ENLARGED PROSTATE: ICD-10-CM

## 2022-07-19 DIAGNOSIS — I10 ESSENTIAL HYPERTENSION, MALIGNANT: ICD-10-CM

## 2022-07-19 DIAGNOSIS — I73.9 PERIPHERAL VASCULAR DISEASE (HCC): ICD-10-CM

## 2022-07-19 DIAGNOSIS — I48.91 RAPID ATRIAL FIBRILLATION (HCC): ICD-10-CM

## 2022-07-19 DIAGNOSIS — E16.2 HYPOGLYCEMIA: ICD-10-CM

## 2022-07-19 LAB
SL AMB  POCT GLUCOSE, UA: NEGATIVE
SL AMB LEUKOCYTE ESTERASE,UA: NEGATIVE
SL AMB POCT BILIRUBIN,UA: NEGATIVE
SL AMB POCT BLOOD,UA: NEGATIVE
SL AMB POCT CLARITY,UA: CLEAR
SL AMB POCT COLOR,UA: YELLOW
SL AMB POCT HEMOGLOBIN AIC: 5.8 (ref ?–6.5)
SL AMB POCT KETONES,UA: NEGATIVE
SL AMB POCT NITRITE,UA: NEGATIVE
SL AMB POCT PH,UA: 5
SL AMB POCT SPECIFIC GRAVITY,UA: 1.01
SL AMB POCT URINE PROTEIN: NEGATIVE
SL AMB POCT UROBILINOGEN: 0.2

## 2022-07-19 PROCEDURE — G0439 PPPS, SUBSEQ VISIT: HCPCS | Performed by: INTERNAL MEDICINE

## 2022-07-19 PROCEDURE — 83036 HEMOGLOBIN GLYCOSYLATED A1C: CPT | Performed by: INTERNAL MEDICINE

## 2022-07-19 PROCEDURE — 81002 URINALYSIS NONAUTO W/O SCOPE: CPT | Performed by: INTERNAL MEDICINE

## 2022-07-19 PROCEDURE — 93000 ELECTROCARDIOGRAM COMPLETE: CPT | Performed by: INTERNAL MEDICINE

## 2022-07-19 PROCEDURE — 99214 OFFICE O/P EST MOD 30 MIN: CPT | Performed by: INTERNAL MEDICINE

## 2022-07-19 RX ORDER — DIGOXIN 125 MCG
125 TABLET ORAL DAILY
Qty: 45 TABLET | Refills: 3 | Status: SHIPPED | OUTPATIENT
Start: 2022-07-19

## 2022-07-19 RX ORDER — IBRUTINIB 140 MG/1
140 CAPSULE ORAL 2 TIMES DAILY
Qty: 180 CAPSULE | Refills: 3 | Status: SHIPPED | OUTPATIENT
Start: 2022-07-19

## 2022-07-19 RX ORDER — TAMSULOSIN HYDROCHLORIDE 0.4 MG/1
0.4 CAPSULE ORAL
Qty: 90 CAPSULE | Refills: 3 | Status: SHIPPED | OUTPATIENT
Start: 2022-07-19

## 2022-07-19 RX ORDER — VALSARTAN 40 MG/1
40 TABLET ORAL DAILY
Qty: 180 TABLET | Refills: 3 | Status: SHIPPED | OUTPATIENT
Start: 2022-07-19

## 2022-07-19 RX ORDER — NEBIVOLOL 10 MG/1
10 TABLET ORAL DAILY
Qty: 90 TABLET | Refills: 3 | Status: SHIPPED | OUTPATIENT
Start: 2022-07-19

## 2022-07-19 NOTE — PROGRESS NOTES
Assessment/Plan:         Diagnoses and all orders for this visit:    Encounter for general adult medical examination with abnormal findings; done in detail  FU w Hematology   RTC in 3 mos w Blood  Work  Renew :  -     Imbruvica [address]; Take 1 capsule (140 mg total) by mouth 2 (two) times a day  -     POCT ECG  -     POCT hemoglobin A1c  -     POCT urine dip    Screen for colon cancer  -     Ambulatory referral for colonoscopy; Future    Rapid atrial fibrillation ([facility] ); stable  Continue same  [facility]ew :  -     apixaban (ELIQUIS) 5 mg; Take 1 tablet (5 mg total) by mouth 2 (two) times a day  -     digoxin (LANOXIN) 0 125 mg tablet; Take 1 tablet (125 mcg total) by mouth daily  -     POCT ECG  FU w cardiology    Essential hypertension,: stable on :  -     nebivolol (Bystolic) 10 mg tablet; Take 1 tablet (10 mg total) by mouth daily  RTC in 3 mos w Blood  Work    Enlarged prostate  -     tamsulosin (Flomax) 0 4 mg; Take 1 capsule (0 4 mg total) by mouth daily with dinner  -     POCT urine dip    Chronic atrial fibrillation (HCC)  -     valsartan (DIOVAN) 40 mg tablet; Take 1 tablet (40 mg total) by mouth daily    Chronic combined systolic and diastolic heart failure ([facility] ); stable  Continue same  FU w cardiology  Renew :  -     valsartan (DIOVAN) 40 mg tablet; Take 1 tablet (40 mg total) by mouth daily    Chronic lymphocytic leukemia ([facility] ); in remission  FU w hematology  Continue :  -     Imbruvica 140 MG CAPS; Take 1 capsule (140 mg total) by mouth 2 (two) times a day    Hypoglycemia  -     POCT hemoglobin A1c    Peripheral vascular disease ([facility] ); stable  Continue same  FU w vascular        Subjective:      Patient ID: Rodrick Jin is a 77 y o  male  77 Y O man is here for AWV and Regular check Up, He has few symptoms, recent blood work and med list reviewed w Pt in detail          The following portions of the patient's history were reviewed and updated as appropriate: allergies, current medications, past family history, past medical history, past social history, past surgical history and problem list     Review of Systems   Constitutional: Positive for fatigue  Negative for chills and fever  HENT: Negative for congestion, facial swelling, sore throat, trouble swallowing and voice change  Eyes: Negative for pain, discharge and visual disturbance  Respiratory: Negative for cough, shortness of breath and wheezing  Cardiovascular: Negative for chest pain, palpitations and leg swelling  Gastrointestinal: Negative for abdominal pain, blood in stool, constipation, diarrhea and nausea  Endocrine: Negative for polydipsia, polyphagia and polyuria  Genitourinary: Negative for difficulty urinating, hematuria and urgency  Musculoskeletal: Negative for arthralgias and myalgias  Skin: Negative for rash  Neurological: Positive for dizziness  Negative for tremors, weakness and headaches  Hematological: Negative for adenopathy  Does not bruise/bleed easily  Psychiatric/Behavioral: Negative for dysphoric mood, sleep disturbance and suicidal ideas  Objective:      /100 (BP Location: Left arm, Patient Position: Sitting, Cuff Size: Standard)   Pulse 57   Temp (!) 96 8 °F (36 °C) (Tympanic)   Resp 15   Ht 5' 6" (1 676 m)   Wt 72 5 kg (159 lb 12 8 oz)   SpO2 98%   BMI 25 79 kg/m²          Physical Exam  Constitutional:       General: He is not in acute distress  HENT:      Head: Normocephalic  Mouth/Throat:      Pharynx: No oropharyngeal exudate  Eyes:      General: No scleral icterus  Conjunctiva/sclera: Conjunctivae normal       Pupils: Pupils are equal, round, and reactive to light  Neck:      Thyroid: No thyromegaly  Cardiovascular:      Rate and Rhythm: Normal rate and regular rhythm  Heart sounds: Murmur heard  Pulmonary:      Effort: Pulmonary effort is normal  No respiratory distress  Breath sounds: Normal breath sounds  No wheezing or rales     Abdominal: General: Bowel sounds are normal  There is no distension  Palpations: Abdomen is soft  Tenderness: There is no abdominal tenderness  There is no guarding or rebound  Musculoskeletal:         General: No tenderness  Cervical back: Neck supple  Lymphadenopathy:      Cervical: No cervical adenopathy  Skin:     Coloration: Skin is not pale  Findings: No rash  Neurological:      Mental Status: He is alert and oriented to person, place, and time  Sensory: No sensory deficit  Motor: No weakness

## 2022-07-19 NOTE — PATIENT INSTRUCTIONS

## 2022-07-19 NOTE — PROGRESS NOTES
Assessment and Plan:     Problem List Items Addressed This Visit    None     Visit Diagnoses     Screen for colon cancer    -  Primary           Preventive health issues were discussed with patient, and age appropriate screening tests were ordered as noted in patient's After Visit Summary  Personalized health advice and appropriate referrals for health education or preventive services given if needed, as noted in patient's After Visit Summary       History of Present Illness:     Patient presents for a Medicare Wellness Visit    HPI   Patient Care Team:  Mo Stanley MD as PCP - General (Internal Medicine)     Review of Systems:     Review of Systems     Problem List:     Patient Active Problem List   Diagnosis    Hypertension    Hyperlipidemia    [facility]ocytic leukemia ([facility] )    Meningioma ([facility] )    Osteoarthritis    Persistent atrial fibrillation (HCC)    Mild pulmonary hypertension ([facility] )    Benign essential HTN    Benign non-nodular prostatic hyperplasia with lower urinary tract symptoms    Chest pain    Increased frequency of urination    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    Moderate mitral valve regurgitation    Lab test positive for detection of COVID-19 virus    Acute URI    Peripheral vascular disease ([place] [id number] )      Past Medical and Surgical History:     Past Medical History:   Diagnosis Date    Hypertension     Leukemia ([facility] )     Osteoarthritis 9/26/2012     Past Surgical History:   Procedure Laterality Date    CARDIOVERSION ([place] ONLY)  6/12/2020         NO PAST SURGERIES      HI ECHO TRANSESOPHAG R-T 2D W/PRB IMG ACQUISJ I&R  6/12/2020           Family History:     Family History   Problem Relation Age of Onset    Diabetes Mother     Heart disease Father       Social History:     Social History     Socioeconomic History    Marital status: /Civil Union     Spouse name: Not on file    Number of children: Not on file    Years of education: Not on file   Amy Luna Highest education level: Not on file   Occupational History    Not on file   Tobacco Use    Smoking status: Never Smoker    Smokeless tobacco: Never Used    Tobacco comment: No passive smoke exposure   Vaping Use    Vaping Use: Never used   Substance and Sexual Activity    Alcohol use: Yes    Drug use: No    Sexual activity: Yes     Partners: Female   Other Topics Concern    Not on file   Social History Narrative    Not on file     Social Determinants of Health     Financial Resource Strain: Not on file   Food Insecurity: Not on file   Transportation Needs: Not on file   Physical Activity: Not on file   Stress: Not on file   Social Connections: Not on file   Intimate Partner Violence: Not on file   Housing Stability: Not on file      Medications and Allergies:     Current Outpatient Medications   Medication Sig Dispense Refill    apixaban (ELIQUIS) 5 mg Take 1 tablet (5 mg total) by mouth 2 (two) times a day 180 tablet 3    Diclofenac Sodium  MG 24 hr tablet Take 1 tablet (100 mg total) by mouth daily With food/Lunch 30 tablet 3    digoxin (LANOXIN) 0 125 mg tablet TAKE 1 TABLET 3 TIMES A WEEK ON MONDAY, WEDNESDAY AND FRIDAY 45 tablet 3    Imbruvica 140 MG CAPS Take 140 mg by mouth 2 (two) times a day      nebivolol (Bystolic) 10 mg tablet Take 1 tablet (10 mg total) by mouth daily 90 tablet 3    tamsulosin (Flomax) 0 4 mg Take 1 capsule (0 4 mg total) by mouth daily with dinner 90 capsule 3    valsartan (DIOVAN) 40 mg tablet Take 1 tablet (40 mg total) by mouth daily 180 tablet 3     No current facility-administered medications for this visit       Allergies   Allergen Reactions    Tetanus Antitoxin Rash     Patient told by mother medication was given as a child    Tetanus Toxoid     Allopurinol Rash      Immunizations:     Immunization History   Administered Date(s) Administered    Fluzone Split Quad 0 5 mL 10/10/2017    INFLUENZA 11/01/2013, 10/20/2015, 10/03/2016    Influenza Quadrivalent Preservative Free 3 years and older IM 10/10/2017    Influenza Split 10/16/2013    Influenza Split High Dose Preservative Free IM 10/03/2016    Influenza, high dose seasonal 0 7 mL 10/05/2018, 09/07/2021    Influenza, seasonal, injectable 10/20/2015    Pneumococcal Conjugate 13-Valent 09/07/2021    Pneumococcal Polysaccharide PPV23 10/01/2002, 10/16/2013      Health Maintenance:         Topic Date Due    Colorectal Cancer Screening  11/07/2018    Hepatitis C Screening  Completed         Topic Date Due    COVID-19 Vaccine (1) Never done    Influenza Vaccine (1) 09/01/2022      Medicare Screening Tests and Risk Assessments:     Maren Salazar is here for his Subsequent Wellness visit  Last Medicare Wellness visit information reviewed, patient interviewed, no change since last AWV  Health Risk Assessment:   Patient rates overall health as good  Patient feels that their physical health rating is same  Eyesight was rated as same  Hearing was rated as slightly worse  Patient feels that their emotional and mental health rating is same  Patients states they are never, rarely angry  Patient states they are never, rarely unusually tired/fatigued  Pain experienced in the last 7 days has been a lot  Patient's pain rating has been 1/10  Patient states that he has experienced no weight loss or gain in last 6 months  Depression Screening:   PHQ-2 Score: 0      Fall Risk Screening: In the past year, patient has experienced: no history of falling in past year      Home Safety:  Patient does not have trouble with stairs inside or outside of their home  Patient has working smoke alarms and has working carbon monoxide detector  Home safety hazards include: none  Nutrition:   Current diet is Regular  Medications:   Patient is not currently taking any over-the-counter supplements  Patient is able to manage medications       Activities of Daily Living (ADLs)/Instrumental Activities of Daily Living (IADLs): Walk and transfer into and out of bed and chair?: Yes  Dress and groom yourself?: Yes    Bathe or shower yourself?: Yes    Feed yourself? Yes  Do your laundry/housekeeping?: Yes  Manage your money, pay your bills and track your expenses?: Yes  Make your own meals?: Yes    Do your own shopping?: Yes    Previous Hospitalizations:   Any hospitalizations or ED visits within the last 12 months?: No      Advance Care Planning:   Living will: Yes    Durable POA for healthcare:  Yes    Advanced directive: Yes    Advanced directive counseling given: Yes    Five wishes given: Yes    Patient declined ACP directive: Yes    End of Life Decisions reviewed with patient: Yes    Provider agrees with end of life decisions: Yes      PREVENTIVE SCREENINGS      Cardiovascular Screening:    General: Screening Not Indicated, History Lipid Disorder, Risks and Benefits Discussed and Screening Current      Diabetes Screening:     General: Screening Current and Risks and Benefits Discussed      Colorectal Cancer Screening:     General: Screening Current and Risks and Benefits Discussed      Prostate Cancer Screening:    General: Screening Not Indicated and Risks and Benefits Discussed      Osteoporosis Screening:    General: Screening Not Indicated, History Osteoporosis and Risks and Benefits Discussed      Abdominal Aortic Aneurysm (AAA) Screening:    Risk factors include: age between 73-69 yo        General: Risks and Benefits Discussed      Lung Cancer Screening:     General: Screening Not Indicated and Risks and Benefits Discussed      Hepatitis C Screening:    General: Screening Current and Risks and Benefits Discussed    Screening, Brief Intervention, and Referral to Treatment (SBIRT)    Screening      Single Item Drug Screening:  How often have you used an illegal drug (including marijuana) or a prescription medication for non-medical reasons in the past year? never    Single Item Drug Screen Score: 0  Interpretation: Negative screen for possible drug use disorder    Other Counseling Topics:   Car/seat belt/driving safety, skin self-exam, sunscreen and calcium and vitamin D intake and regular weightbearing exercise  No exam data present     Physical Exam:     There were no vitals taken for this visit      Physical Exam     Elza Valdez MD

## 2022-08-05 DIAGNOSIS — I48.91 RAPID ATRIAL FIBRILLATION (HCC): ICD-10-CM

## 2022-08-08 RX ORDER — APIXABAN 5 MG/1
TABLET, FILM COATED ORAL
Qty: 180 TABLET | Refills: 3 | Status: SHIPPED | OUTPATIENT
Start: 2022-08-08

## 2022-08-23 ENCOUNTER — OFFICE VISIT (OUTPATIENT)
Dept: CARDIOLOGY CLINIC | Facility: CLINIC | Age: 77
End: 2022-08-23
Payer: MEDICARE

## 2022-08-23 VITALS
WEIGHT: 163.6 LBS | BODY MASS INDEX: 26.29 KG/M2 | HEIGHT: 66 IN | DIASTOLIC BLOOD PRESSURE: 90 MMHG | HEART RATE: 70 BPM | SYSTOLIC BLOOD PRESSURE: 144 MMHG

## 2022-08-23 DIAGNOSIS — I42.8 NONISCHEMIC CARDIOMYOPATHY (HCC): ICD-10-CM

## 2022-08-23 DIAGNOSIS — I34.0 MODERATE MITRAL VALVE REGURGITATION: ICD-10-CM

## 2022-08-23 DIAGNOSIS — I27.20 MILD PULMONARY HYPERTENSION (HCC): ICD-10-CM

## 2022-08-23 DIAGNOSIS — I48.19 PERSISTENT ATRIAL FIBRILLATION (HCC): Primary | ICD-10-CM

## 2022-08-23 DIAGNOSIS — I10 PRIMARY HYPERTENSION: ICD-10-CM

## 2022-08-23 PROCEDURE — 93000 ELECTROCARDIOGRAM COMPLETE: CPT | Performed by: INTERNAL MEDICINE

## 2022-08-23 PROCEDURE — 99215 OFFICE O/P EST HI 40 MIN: CPT | Performed by: INTERNAL MEDICINE

## 2022-08-23 NOTE — PATIENT INSTRUCTIONS
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     3  Moderate mitral valve regurgitation     4  Mild pulmonary hypertension ([facility] )     5  Primary hypertension       Persistent atrial fibrillation [facility])  Mr Giorgio Bledsoe is overall stable  He remains in atrial fibrillation rhythm but his heart rate seemed to be well controlled  He has chronic heart failure but he is well compensated on examination  He is on good medical regimen  He is on reduced dose of Eliquis due to excessive bruising  He is not on diuretic  -- at this time we will continue his current medications  -- today we talked about possibility of Watchman left atrial appendage closure device to prevent stroke in the future as he is on subtherapeutic regimen  He is currently not interested in it  -- we discussed about warning signs of heart failure and he is advised to report to us if he develops any increasing shortness of breath fatigue or lightheadedness or experiences any passing out or near passing out episodes  -- also discussed about steps he can take to prevent congestive heart failure  These are summarized below  The following routine measures are being advised to prevent exacerbation of congestive heart failure:     - Daily or atleast weekly checking of weight  Notify your clinicians if greater than 2 lb is gained in 1 day or greater than 5 lb is gained in 1 wk  - Checking for lower extremity swelling  Examine legs for swelling (new or increase in existing swelling) and describe if swelling reaches ankle, shin, or knee  - Monitoring for decrease in exercise tolerance  Check your self for unusual shortness of breath at rest, or with mild exertion, moderate exertion, or none at all     - Monitoring for worsening shortness of breath on lying down      Check for increase in number of pillows used at night and for increase in waking at night with shortness of breath  - Salt/sodium restriction to less than 2 g a day  Calculate total sodium intake in a day from nutrition labels and food charts  Understand hidden sources of salt intake  Eliminate the salt shaker  (Remember, One teaspoon of table salt = 2,300 mg of sodium)   Avoid using garlic salt, onion salt, MSG, meat tenderizers, broth mixes, [place] food, soy sauce, teriyaki sauce, barbeque sauce, sauerkraut, olives, pickles, pickle relish, merchant bits, and croutons  Use fresh ingredients and/or foods with no added salt  Try orange, lemon, lime, pineapple juice, or vinegar as a base for meat marinades or to add tart flavor  Avoid convenience foods such as canned soups, entrees, vegetables, pasta and rice mixes, frozen dinners, instant cereal and puddings, and gravy sauce mixes  Select frozen meals that contain around 600 mg sodium or less  Use fresh, frozen, no-added-salt canned vegetables, low-sodium soups, and low-sodium lunchmeats  Look for seasoning or spice blends with no salt, or try fresh herbs, onions, or garlic  You are advised to inform us for any concerns regarding above measures

## 2022-08-23 NOTE — ASSESSMENT & PLAN NOTE
Mr Crystal Brink is overall stable  He remains in atrial fibrillation rhythm but his heart rate seemed to be well controlled  He has chronic heart failure but he is well compensated on examination  He is on good medical regimen  He is on reduced dose of Eliquis due to excessive bruising  He is not on diuretic  -- at this time we will continue his current medications  -- today we talked about possibility of Watchman left atrial appendage closure device to prevent stroke in the future as he is on subtherapeutic regimen  He is currently not interested in it  -- we discussed about warning signs of heart failure and he is advised to report to us if he develops any increasing shortness of breath fatigue or lightheadedness or experiences any passing out or near passing out episodes  -- also discussed about steps he can take to prevent congestive heart failure  These are summarized below  The following routine measures are being advised to prevent exacerbation of congestive heart failure:     - Daily or atleast weekly checking of weight  Notify your clinicians if greater than 2 lb is gained in 1 day or greater than 5 lb is gained in 1 wk  - Checking for lower extremity swelling  Examine legs for swelling (new or increase in existing swelling) and describe if swelling reaches ankle, shin, or knee  - Monitoring for decrease in exercise tolerance  Check your self for unusual shortness of breath at rest, or with mild exertion, moderate exertion, or none at all     - Monitoring for worsening shortness of breath on lying down  Check for increase in number of pillows used at night and for increase in waking at night with shortness of breath  - Salt/sodium restriction to less than 2 g a day  Calculate total sodium intake in a day from nutrition labels and food charts  Understand hidden sources of salt intake   Eliminate the salt shaker   (Remember, One teaspoon of table salt = 2,300 mg of sodium)    Avoid using garlic salt, onion salt, MSG, meat tenderizers, broth mixes, [place] food, soy sauce, teriyaki sauce, barbeque sauce, sauerkraut, olives, pickles, pickle relish, merchant bits, and croutons    Use fresh ingredients and/or foods with no added salt   Try orange, lemon, lime, pineapple juice, or vinegar as a base for meat marinades or to add tart flavor   Avoid convenience foods such as canned soups, entrees, vegetables, pasta and rice mixes, frozen dinners, instant cereal and puddings, and gravy sauce mixes    Select frozen meals that contain around 600 mg sodium or less   Use fresh, frozen, no-added-salt canned vegetables, low-sodium soups, and low-sodium lunchmeats   Look for seasoning or spice blends with no salt, or try fresh herbs, onions, or garlic  You are advised to inform us for any concerns regarding above measures

## 2022-08-23 NOTE — PROGRESS NOTES
[facility]  [address] [place] [address]  Phone#  [phone]  Fax#  [phone]  *-*-*-*-*-*-*-*-*-*-*-*-*-*-*-*-*-*-*-*-*-*-*-*-*-*-*-*-*-*-*-*-*-*-*-*-*-*-*-*-*-*-*-*-*-*-*-*-*-*-*-*-*-*  ENCOUNTER DATE: 08/23/22 3:43 PM  PATIENT NAME: Emperatriz Crocker   1945    [id number]  AGE:76 y o  SEX: male  Rupesh Dahl MD     PRIMARY CARE PHYSICIAN: Escobar Fletcher MD    DIAGNOSES:  1  Atrial flutter and atrial fibrillation (initially diagnosed February 2020)- status post cardioversion in June 2020 followed by recurrence, now persistent atrial fibrillation  2  Chronic lymphocytic leukemia initially diagnosed in 2013, now with relapse, currently on ibrutinib and rituximab  3  Intracranial meningioma along the undersurface of right cerebellar tentorium diagnosed in 2017  4  Degenerative joint disease with arthritis  5  Chronic gout  6  Benign prostatic hypertrophy  7  Essential hypertension  8  Cardiomyopathy, unspecified, heart failure with mid range ejection fraction, EF 46 percent  9  Mitral valve regurgitation  10  Pulmonary hypertension    ECHOCARDIOGRAM August 2021: Dilated left ventricular cavity, LVIDd 5 8 cm, mild to moderately reduced left ventricular systolic function with global hypokinesis, EF 40%, mild left atrial cavity enlargement, bowing interatrial septum towards right side, mild-to-moderate mitral valve regurgitation, mild-to-moderate tricuspid valve regurgitation, trace pulmonic valve regurgitation  HOLTER MONITOR August 2021 :  Patient was in atrial fibrillation rhythm throughout with average heart rate of 78 and minimum heart rate of 40 and maximum heart rate of 178 beats per minute  There was no significant bradyarrhythmia or pause events  There were rare ventricular ectopic beats      ECHOCARDIOGRAM March 3, 2020, mildly reduced left ventricular systolic function with global hypokinesis, EF of 46 percent, mild left ventricular cavity enlargement, indeterminate diastolic dysfunction, mild left atrial cavity enlargement, moderate mitral valve regurgitation, mild mitral annular calcification, mild to moderate tricuspid valve and trace pulmonic valve regurgitation, mild pulmonary hypertension with estimated RVSP/PASP 42 mmHg, no pericardial effusion  CURRENT ECG     Results for orders placed or performed in visit on 08/23/22   POCT ECG    Narrative    Atrial fibrillation with controlled ventricular response, HR 70 beats per minute, borderline incomplete right bundle-branch block, moderate voltage criteria for LVH, no significant ST T-wave abnormalities  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     3  Moderate mitral valve regurgitation     4  Mild pulmonary hypertension ([address] )     5  Primary hypertension       Persistent atrial fibrillation [facility])  Mr Sudeep Bruce is overall stable  He remains in atrial fibrillation rhythm but his heart rate seemed to be well controlled  He has chronic heart failure but he is well compensated on examination  He is on good medical regimen  He is on reduced dose of Eliquis due to excessive bruising  He is not on diuretic  -- at this time we will continue his current medications  -- today we talked about possibility of Watchman left atrial appendage closure device to prevent stroke in the future as he is on subtherapeutic regimen  He is currently not interested in it  -- we discussed about warning signs of heart failure and he is advised to report to us if he develops any increasing shortness of breath fatigue or lightheadedness or experiences any passing out or near passing out episodes  -- also discussed about steps he can take to prevent congestive heart failure  These are summarized below      The following routine measures are being advised to prevent exacerbation of congestive heart failure:     - Daily or atleast weekly checking of weight  Notify your clinicians if greater than 2 lb is gained in 1 day or greater than 5 lb is gained in 1 wk  - Checking for lower extremity swelling  Examine legs for swelling (new or increase in existing swelling) and describe if swelling reaches ankle, shin, or knee  - Monitoring for decrease in exercise tolerance  Check your self for unusual shortness of breath at rest, or with mild exertion, moderate exertion, or none at all     - Monitoring for worsening shortness of breath on lying down  Check for increase in number of pillows used at night and for increase in waking at night with shortness of breath  - Salt/sodium restriction to less than 2 g a day  Calculate total sodium intake in a day from nutrition labels and food charts  Understand hidden sources of salt intake   Eliminate the salt shaker  (Remember, One teaspoon of table salt = 2,300 mg of sodium)    Avoid using garlic salt, onion salt, MSG, meat tenderizers, broth mixes, [place] food, soy sauce, teriyaki sauce, barbeque sauce, sauerkraut, olives, pickles, pickle relish, merchant bits, and croutons    Use fresh ingredients and/or foods with no added salt   Try orange, lemon, lime, pineapple juice, or vinegar as a base for meat marinades or to add tart flavor   Avoid convenience foods such as canned soups, entrees, vegetables, pasta and rice mixes, frozen dinners, instant cereal and puddings, and gravy sauce mixes    Select frozen meals that contain around 600 mg sodium or less   Use fresh, frozen, no-added-salt canned vegetables, low-sodium soups, and low-sodium lunchmeats   Look for seasoning or spice blends with no salt, or try fresh herbs, onions, or garlic  You are advised to inform us for any concerns regarding above measures              INTERVAL HISTORY & HISTORY OF PRESENT ILLNESS     Rodrick Jin is here for follow-up regarding his cardiac comorbidities which include:  Atrial flutter and atrial fibrillation, cardiomyopathy, hypertension and other comorbidities  He was last seen by me in June 2020 when he underwent FRANK guided cardioversion for atrial flutter rhythm  For some reason he was lost to follow-up after that  He is here for visit accompanied with his wife  From a symptom perspective he reports that he has been feeling well  He has had no chest pain or shortness of breath or dizziness or palpitations or passing out or near passing out episodes  He denies any orthopnea or PND or worsening pedal edema  He does admit to getting bruised easily and having minor bleeds  Denies any falls or major bleeding  He has been compliant with his medications  He has no history of stroke or mini stroke in the past   He continues to receive reduced dose of ibrutinib and rituximab for his relapsed CLL and this is overall stable  Functional capacity status:  Good   (Excellent- >10 METs; Good: (7-10 METs); Moderate (4-7 METs); Poor (<= 4 METs)    Any chronic stressors:  None   (feeling of poor health, financial problems, and social isolation etc)  Tobacco or alcohol dependence:  He has never been a smoker  He has 1 alcoholic drink a week    Current cardiac medications:  Valsartan 40 mg daily, nebivolol 10 mg daily, digoxin 125 mcg daily  He takes Eliquis 5 mg only once a day  Blood work from July 18, 2022 is significant for BUN 17, creatinine 1 08, sodium 139, potassium 4 2, normal LFTs  , total cholesterol 202, triglyceride 96, HDL 62, hemoglobin 16 1, hematocrit 46 1, platelet count 527, TSH 3 02    REVIEW OF SYSTEMS   Positive for:  As noted above in HPI  Negative for: All remaining as reviewed below and in HPI      SYSTEM SYMPTOMS REVIEWED:  General--weight change, fever, night sweats  Respiratory--cough, wheezing, shortness of breath, sputum production  Cardiovascular--chest pain, syncope, dyspnea on exertion, edema, decline in exercise tolerance, claudication   Gastrointestinal--persistent vomiting, diarrhea, abdominal distention, blood in stool   Muscular or skeletal--joint pain or swelling   Neurologic--headaches, syncope, abnormal movement  Hematologic--history of easy bruising and bleeding   Endocrine--thyroid enlargement, heat or cold intolerance, polyuria   Psychiatric--anxiety, depression     *-*-*-*-*-*-*-*-*-*-*-*-*-*-*-*-*-*-*-*-*-*-*-*-*-*-*-*-*-*-*-*-*-*-*-*-*-*-*-*-*-*-*-*-*-*-*-*-*-*-*-*-*-*-  VITAL SIGNS     CURRENT VITAL SIGNS:   Vitals:    08/23/22 1506   BP: 144/90   Pulse: 70   Weight: 74 2 kg (163 lb 9 6 oz)   Height: 5' 6" (1 676 m)       BMI: Body mass index is 26 41 kg/m²  WEIGHTS:   Wt Readings from Last 25 Encounters:   08/23/22 74 2 kg (163 lb 9 6 oz)   07/19/22 72 5 kg (159 lb 12 8 oz)   09/07/21 72 6 kg (160 lb)   05/13/21 70 kg (154 lb 6 4 oz)   04/26/21 71 kg (156 lb 8 4 oz)   06/12/20 71 7 kg (158 lb)   05/29/20 71 7 kg (158 lb)   05/29/20 72 7 kg (160 lb 4 8 oz)   03/03/20 72 6 kg (160 lb)   02/27/20 72 2 kg (159 lb 3 2 oz)   02/06/20 73 9 kg (163 lb)   01/28/20 74 1 kg (163 lb 6 4 oz)   01/23/20 73 5 kg (162 lb)   01/05/20 72 1 kg (159 lb)   12/20/19 73 kg (161 lb)   12/13/19 72 7 kg (160 lb 4 8 oz)   11/27/19 72 7 kg (160 lb 4 8 oz)   09/12/19 72 1 kg (159 lb)   10/05/18 75 3 kg (166 lb)   08/31/18 73 9 kg (163 lb)   07/19/18 73 9 kg (163 lb)   07/16/18 75 4 kg (166 lb 3 2 oz)   07/09/18 76 2 kg (168 lb)        *-*-*-*-*-*-*-*-*-*-*-*-*-*-*-*-*-*-*-*-*-*-*-*-*-*-*-*-*-*-*-*-*-*-*-*-*-*-*-*-*-*-*-*-*-*-*-*-*-*-*-*-*-*-  PHYSICAL EXAM     General Appearance:    Alert, cooperative, no distress, appears stated age   Head, Eyes, ENT:     has dry blood from recent bleeding from blunt trauma on right upper forehead, moist mucous mebranes  Neck:   Supple, no carotid bruit or JVD   Back:     Symmetric, no curvature  Lungs:     Respirations unlabored   Clear to auscultation bilaterally,    Chest wall:    No tenderness or deformity   Heart:    Regular rate and rhythm, S1 and S2 normal, no murmur, rub  or gallop  Abdomen:     Soft, non-tender, No obvious masses, or organomegaly   Extremities:   Extremities warm, no cyanosis or edema    Skin:   mild venostatic changes in lower extremities  Has evidence of bruising and several healed minor bleed its over extremities       *-*-*-*-*-*-*-*-*-*-*-*-*-*-*-*-*-*-*-*-*-*-*-*-*-*-*-*-*-*-*-*-*-*-*-*-*-*-*-*-*-*-*-*-*-*-*-*-*-*-*-*-*-*-  CURRENT MEDICATIONS LIST     Current Outpatient Medications:     digoxin (LANOXIN) 0 125 mg tablet, Take 1 tablet (125 mcg total) by mouth daily, Disp: 45 tablet, Rfl: 3    Eliquis 5 MG, take 1 tablet by mouth twice a day (Patient taking differently: Patient states he has been taking this just once daily), Disp: 180 tablet, Rfl: 3    Imbruvica 140 MG CAPS, Take 1 capsule (140 mg total) by mouth 2 (two) times a day (Patient taking differently: Take 140 mg by mouth 2 (two) times a day Patient states he takes this once daily), Disp: 180 capsule, Rfl: 3    nebivolol (Bystolic) 10 mg tablet, Take 1 tablet (10 mg total) by mouth daily, Disp: 90 tablet, Rfl: 3    tamsulosin (Flomax) 0 4 mg, Take 1 capsule (0 4 mg total) by mouth daily with dinner, Disp: 90 capsule, Rfl: 3    valsartan (DIOVAN) 40 mg tablet, Take 1 tablet (40 mg total) by mouth daily, Disp: 180 tablet, Rfl: 3    Diclofenac Sodium  MG 24 hr tablet, Take 1 tablet (100 mg total) by mouth daily With food/Lunch (Patient not taking: No sig reported), Disp: 30 tablet, Rfl: 3       ALLERGIES     Allergies   Allergen Reactions    Tetanus Antitoxin Rash     Patient told by mother medication was given as a child    Tetanus Toxoid     Allopurinol Rash       *-*-*-*-*-*-*-*-*-*-*-*-*-*-*-*-*-*-*-*-*-*-*-*-*-*-*-*-*-*-*-*-*-*-*-*-*-*-*-*-*-*-*-*-*-*-*-*-*-*-*-*-*-*-  LABORATORY DATA   No results found for: NA  Potassium   Date Value Ref Range Status   06/08/2020 4 5 3 5 - 5 3 mmol/L Final 01/30/2020 4 8 3 5 - 5 3 mmol/L Final   01/05/2020 4 1 3 5 - 5 3 mmol/L Final     Chloride   Date Value Ref Range Status   06/08/2020 106 100 - 108 mmol/L Final   01/30/2020 106 100 - 108 mmol/L Final   01/05/2020 105 100 - 108 mmol/L Final     CO2   Date Value Ref Range Status   06/08/2020 28 21 - 32 mmol/L Final   01/30/2020 30 21 - 32 mmol/L Final   01/05/2020 30 21 - 32 mmol/L Final     BUN   Date Value Ref Range Status   06/08/2020 12 5 - 25 mg/dL Final   01/30/2020 16 5 - 25 mg/dL Final   01/05/2020 18 5 - 25 mg/dL Final     Creatinine   Date Value Ref Range Status   06/08/2020 1 00 0 60 - 1 30 mg/dL Final     Comment:     Standardized to IDMS reference method   01/30/2020 1 02 0 60 - 1 30 mg/dL Final     Comment:     Standardized to IDMS reference method   01/05/2020 0 99 0 60 - 1 30 mg/dL Final     Comment:     Standardized to IDMS reference method     eGFR   Date Value Ref Range Status   06/08/2020 74 ml/min/1 73sq m Final   01/30/2020 73 ml/min/1 73sq m Final   01/05/2020 75 ml/min/1 73sq m Final     Calcium   Date Value Ref Range Status   06/08/2020 9 2 8 3 - 10 1 mg/dL Final   01/30/2020 9 5 8 3 - 10 1 mg/dL Final   01/05/2020 9 0 8 3 - 10 1 mg/dL Final     AST   Date Value Ref Range Status   06/08/2020 17 5 - 45 U/L Final     Comment:       Specimen collection should occur prior to Sulfasalazine administration due to the potential for falsely depressed results  01/30/2020 22 5 - 45 U/L Final     Comment:       Specimen collection should occur prior to Sulfasalazine administration due to the potential for falsely depressed results  12/13/2019 18 5 - 45 U/L Final     Comment:       Specimen collection should occur prior to Sulfasalazine administration due to the potential for falsely depressed results        ALT   Date Value Ref Range Status   06/08/2020 21 12 - 78 U/L Final     Comment:       Specimen collection should occur prior to Sulfasalazine and/or Sulfapyridine administration due to the potential for falsely depressed results  01/30/2020 32 12 - 78 U/L Final     Comment:       Specimen collection should occur prior to Sulfasalazine and/or Sulfapyridine administration due to the potential for falsely depressed results  12/13/2019 39 12 - 78 U/L Final     Comment:       Specimen collection should occur prior to Sulfasalazine and/or Sulfapyridine administration due to the potential for falsely depressed results        Alkaline Phosphatase   Date Value Ref Range Status   06/08/2020 97 46 - 116 U/L Final   01/30/2020 96 46 - 116 U/L Final   12/13/2019 120 (H) 46 - 116 U/L Final     Magnesium   Date Value Ref Range Status   01/30/2020 2 4 1 6 - 2 6 mg/dL Final     WBC   Date Value Ref Range Status   06/08/2020 5 36 4 31 - 10 16 Thousand/uL Final   01/30/2020 4 95 4 31 - 10 16 Thousand/uL Final   01/05/2020 4 41 4 31 - 10 16 Thousand/uL Final     Hemoglobin   Date Value Ref Range Status   06/08/2020 16 3 12 0 - 17 0 g/dL Final   01/30/2020 17 3 (H) 12 0 - 17 0 g/dL Final   01/05/2020 15 6 12 0 - 17 0 g/dL Final     Platelets   Date Value Ref Range Status   06/08/2020 242 149 - 390 Thousands/uL Final   01/30/2020 226 149 - 390 Thousands/uL Final   01/05/2020 248 149 - 390 Thousands/uL Final     No results found for: PT, PTT, INR  Digoxin Lvl   Date Value Ref Range Status   01/30/2020 0 9 0 8 - 2 0 ng/mL Final     No results found for: TSH  No results found for: CHOL   Hemoglobin A1C   Date Value Ref Range Status   07/19/2022 5 8 6 5 Final     Gram Stain Result   Date Value Ref Range Status   06/14/2022 1+ Polys  Final   06/14/2022 No bacteria seen  Final     Wound Culture   Date Value Ref Range Status   06/14/2022 2+ Growth of   Final     Comment:     Mixed Skin Jeannette       *-*-*-*-*-*-*-*-*-*-*-*-*-*-*-*-*-*-*-*-*-*-*-*-*-*-*-*-*-*-*-*-*-*-*-*-*-*-*-*-*-*-*-*-*-*-*-*-*-*-*-*-*-*-  PREVIOUS CARDIOLOGY & RADIOLOGY TEST RESULTS   I have personally reviewed pertinent results of cardiovascular tests noted below  Results for orders placed during the hospital encounter of 21    Echo complete with contrast if indicated    Narrative  [facility]  [place], [address]  [phone]    Transthoracic Echocardiogram  2D, M-mode, Doppler, and Color Doppler    Study date:  09-Aug-2021    Patient: Jannette Finnegan  MR number: [id number]  Account number: [de-identified]  : [id number]  Age: 77 years  Gender: Male  Status: Outpatient  Location: [facility] Echo 3  Height: 66 in  Weight: 153 8 lb  BP: 130/ 82 mmHg    Indications: A-fib    Diagnoses: I48 0 - Atrial fibrillation    Sonographer:  Danielle Harrell RDCS  Referring Physician:  Yakov Barros MD  Group:  [id number]  Interpreting Physician:  MANPREET Vanessa     SUMMARY    LEFT VENTRICLE:  The ventricle was mildly dilated to 5 8 cm internal diastolic diameter  Systolic function was mildly to moderately reduced by visual assessment  Ejection fraction was estimated to be 40 %  The rhythm was atrial fibrillation  There was mild diffuse hypokinesis  LEFT ATRIUM:  The atrium was mildly dilated  ATRIAL SEPTUM:  The septum bows from left to right, consistent with increased left atrial pressure  MITRAL VALVE:  There was mild to moderate regurgitation  TRICUSPID VALVE:  There was mild to moderate regurgitation  PULMONIC VALVE:  There was trace regurgitation  SUMMARY MEASUREMENTS  2D measurements:  Unspecified Anatomy:   %FS was 22 2 %  Ao Diam was 3 cm  Ao asc was 3 3 cm   EDV(Teich) was 164 6 ml   EF(Teich) was 44 2 %  ESV(Teich) was 91 9 ml  IVSd was 0 7 cm  LA Diam was 4 2 cm  LAAs A2C was 21 7 cm2  LAAs A4C was 21 9 cm2  LAESV A-L A2C was 71 8 ml  LAESV A-L A4C was 72 3 ml  LAESV Index (A-L) was 40 6 ml/m2  LAESV MOD A2C was 68 2 ml  LAESV MOD A4C was 61 6 ml  LAESV(A-L) was 72 6 ml  LAESV(MOD BP) was 65 1 ml  LAESVInd MOD BP was 36 4 ml/m2  LALs  A2C was 5 6 cm    LALs A4C was 5 7 cm  LVEDV MOD A4C was 122 2 ml  LVEF MOD A4C was 51 2 %  LVESV MOD A4C was 59 6 ml  LVIDd was 5 8 cm  LVIDs was 4 5 cm  LVLd A4C was 7 4 cm  LVLs A4C was 6 6 cm  LVPWd was 0 9 cm  RAAs A4C was  13 8 cm2  RAESV A-L was 36 2 ml  RAESV MOD was 35 3 ml  RALs was 4 4 cm  RVIDd was 3 6 cm  SV MOD A4C was 62 6 ml   SV(Teich) was 72 7 ml   CW measurements:  Unspecified Anatomy:   TR Vmax was 3 m/s  TR maxPG was 35 5 mmHg  MM measurements:  Unspecified Anatomy:   TAPSE was 2 3 cm  HISTORY: PRIOR HISTORY: HTN; AFIB; Pulmonary HTN; MR; PVD; HLD; CP; Suspected tachycardia associated CM    PROCEDURE: The study was performed in the [facility] Echo 3  This was a routine study  The transthoracic approach was used  The study included complete 2D imaging, M-mode, complete spectral Doppler, and color Doppler  The heart rate was 69 bpm, at  the start of the study  Images were obtained from the parasternal, apical, subcostal, and suprasternal notch acoustic windows  Echocardiographic views were limited due to lung interference  Image quality was adequate  LEFT VENTRICLE: The ventricle was mildly dilated to 5 8 cm internal diastolic diameter  Systolic function was mildly to moderately reduced by visual assessment  Ejection fraction was estimated to be 40 %  The rhythm was atrial  fibrillation  There was mild diffuse hypokinesis  Wall thickness was normal     RIGHT VENTRICLE: The size was normal  Systolic function was normal  Wall thickness was normal     LEFT ATRIUM: The atrium was mildly dilated  ATRIAL SEPTUM: The septum bows from left to right, consistent with increased left atrial pressure  RIGHT ATRIUM: Size was normal     MITRAL VALVE: Valve structure was normal  There was normal leaflet separation  DOPPLER: The transmitral velocity was within the normal range  There was no evidence for stenosis  There was mild to moderate regurgitation  AORTIC VALVE: The valve was trileaflet   Leaflets exhibited normal thickness, normal cuspal separation, and sclerosis  DOPPLER: Transaortic velocity was within the normal range  There was no evidence for stenosis  There was no  regurgitation  TRICUSPID VALVE: The valve structure was normal  There was normal leaflet separation  DOPPLER: The transtricuspid velocity was within the normal range  There was no evidence for stenosis  There was mild to moderate regurgitation  Estimated  peak PA pressure was 39- 44 mmHg  The findings suggest mild pulmonary hypertension  PULMONIC VALVE: Leaflets exhibited normal thickness, no calcification, and normal cuspal separation  DOPPLER: The transpulmonic velocity was within the normal range  There was no evidence for stenosis  There was trace regurgitation  PERICARDIUM: There was no pericardial effusion  The pericardium was normal in appearance  AORTA: The root exhibited normal size  The ascending aorta was normal in size  SYSTEM MEASUREMENT TABLES    2D  %FS: 22 2 %  Ao Diam: 3 cm  Ao asc: 3 3 cm  EDV(Teich): 164 6 ml  EF(Teich): 44 2 %  ESV(Teich): 91 9 ml  IVSd: 0 7 cm  LA Diam: 4 2 cm  LAAs A2C: 21 7 cm2  LAAs A4C: 21 9 cm2  LAESV A-L A2C: 71 8 ml  LAESV A-L A4C: 72 3 ml  LAESV Index (A-L): 40 6 ml/m2  LAESV MOD A2C: 68 2 ml  LAESV MOD A4C: 61 6 ml  LAESV(A-L): 72 6 ml  LAESV(MOD BP): 65 1 ml  LAESVInd MOD BP: 36 4 ml/m2  LALs A2C: 5 6 cm  LALs A4C: 5 7 cm  LVEDV MOD A4C: 122 2 ml  LVEF MOD A4C: 51 2 %  LVESV MOD A4C: 59 6 ml  LVIDd: 5 8 cm  LVIDs: 4 5 cm  LVLd A4C: 7 4 cm  LVLs A4C: 6 6 cm  LVPWd: 0 9 cm  RAAs A4C: 13 8 cm2  RAESV A-L: 36 2 ml  RAESV MOD: 35 3 ml  RALs: 4 4 cm  RVIDd: 3 6 cm  SV MOD A4C: 62 6 ml  SV(Teich): 72 7 ml    CW  TR Vmax: 3 m/s  TR maxP 5 mmHg    MM  TAPSE: 2 3 cm    [facility] Accredited Echocardiography Laboratory    Prepared and electronically signed by    MANPREET Aparicio  Signed 09-Aug-2021 14:13:30    No results found for this or any previous visit      No results found for this or any previous visit  No results found for this or any previous visit  Holter monitor - 48 hour  PT NAME: Valentina Gill  : 1945  AGE: 77 y o  GENDER: male  MRN: [id number]   PROCEDURE: Holter monitor - 48 hour    INDICATIONS:  Palpitations    FINDINGS:  Total beats: 222325    Ventricular Ectopy  Total VE Beats: 22  VE percentage: <0 1%    Supraventricular Ectopy  Total SVE Beat: 0  SVE percentage: 0%    Atrial Fibrillation  AF beats: 815500  AF percentage 100%    CONCLUSIONS:  1  Holter monitor reveals the underlying rhythm is atrial fibrillation   with an average heart rate of 78 beats per minute, a minimum heart rate of   40 beats per minute and a maximum heart rate of 178 beats per minute  2  There were about 22 ventricular ectopic beats, mostly occurring as   single PVC's with no evidence of ventricular tachycardia  Some of these   may actually be a barely conducted supraventricular beats, Dmitri beats  3  There is no evidence of significant bradyarrhythmia or advanced heart   block  The longest R to R was 2 8 seconds with afib    4  The patient's symptom diary reported leg aches while sitting which   correlated with rate controlled AFib 82 beats per minute         *-*-*-*-*-*-*-*-*-*-*-*-*-*-*-*-*-*-*-*-*-*-*-*-*-*-*-*-*-*-*-*-*-*-*-*-*-*-*-*-*-*-*-*-*-*-*-*-*-*-*-*-*-*-  SIGNATURES:   @SIG@   Mely Sanchez MD; ABE    *-*-*-*-*-*-*-*-*-*-*-*-*-*-*-*-*-*-*-*-*-*-*-*-*-*-*-*-*-*-*-*-*-*-*-*-*-*-*-*-*-*-*-*-*-*-*-*-*-*-*-*-*-*-  PAST MEDICAL HISTORY:  Past Medical History:   Diagnosis Date    Hypertension     Leukemia ([address] )     Osteoarthritis 2012    PAST SURGICAL HISTORY:  Past Surgical History:   Procedure Laterality Date    CARDIOVERSION ([place] ONLY)  2020         NO PAST SURGERIES      [place] ECHO TRANSESOPHAG R-T 2D W/PRB IMG ACQUISJ I&R  2020              FAMILY HISTORY:  Family History   Problem Relation Age of Onset    Diabetes Mother     Heart disease Father     SOCIAL HISTORY:  Social History     Tobacco Use   Smoking Status Never Smoker   Smokeless Tobacco Never Used   Tobacco Comment    No passive smoke exposure      Social History     Substance and Sexual Activity   Alcohol Use Yes     Social History     Substance and Sexual Activity   Drug Use No    [id number]     *-*-*-*-*-*-*-*-*-*-*-*-*-*-*-*-*-*-*-*-*-*-*-*-*-*-*-*-*-*-*-*-*-*-*-*-*-*-*-*-*-*-*-*-*-*-*-*-*-*-*-*-*-*  ALLERGIES:  Allergies   Allergen Reactions    Tetanus Antitoxin Rash     Patient told by mother medication was given as a child    Tetanus Toxoid     Allopurinol Rash    CURRENT SCHEDULED MEDICATIONS:    Current Outpatient Medications:     digoxin (LANOXIN) 0 125 mg tablet, Take 1 tablet (125 mcg total) by mouth daily, Disp: 45 tablet, Rfl: 3    Eliquis 5 MG, take 1 tablet by mouth twice a day (Patient taking differently: Patient states he has been taking this just once daily), Disp: 180 tablet, Rfl: 3    Imbruvica 140 MG CAPS, Take 1 capsule (140 mg total) by mouth 2 (two) times a day (Patient taking differently: Take 140 mg by mouth 2 (two) times a day Patient states he takes this once daily), Disp: 180 capsule, Rfl: 3    nebivolol (Bystolic) 10 mg tablet, Take 1 tablet (10 mg total) by mouth daily, Disp: 90 tablet, Rfl: 3    tamsulosin (Flomax) 0 4 mg, Take 1 capsule (0 4 mg total) by mouth daily with dinner, Disp: 90 capsule, Rfl: 3    valsartan (DIOVAN) 40 mg tablet, Take 1 tablet (40 mg total) by mouth daily, Disp: 180 tablet, Rfl: 3    Diclofenac Sodium  MG 24 hr tablet, Take 1 tablet (100 mg total) by mouth daily With food/Lunch (Patient not taking: No sig reported), Disp: 30 tablet, Rfl: 3     *-*-*-*-*-*-*-*-*-*-*-*-*-*-*-*-*-*-*-*-*-*-*-*-*-*-*-*-*-*-*-*-*-*-*-*-*-*-*-*-*-*-*-*-*-*-*-*-*-*-*-*-*-*

## 2022-10-12 PROBLEM — J06.9 ACUTE URI: Status: RESOLVED | Noted: 2021-05-03 | Resolved: 2022-10-12

## 2022-10-26 ENCOUNTER — RA CDI HCC (OUTPATIENT)
Dept: OTHER | Facility: HOSPITAL | Age: 77
End: 2022-10-26

## 2022-10-26 NOTE — PROGRESS NOTES
[facility]  coding opportunities     I11 0     Chart Reviewed number of suggestions sent to Provider: 1     Patients Insurance     Medicare Insurance: [employer]

## 2022-11-01 ENCOUNTER — OFFICE VISIT (OUTPATIENT)
Dept: FAMILY MEDICINE CLINIC | Facility: CLINIC | Age: 77
End: 2022-11-01

## 2022-11-01 VITALS
HEIGHT: 66 IN | OXYGEN SATURATION: 99 % | BODY MASS INDEX: 25.71 KG/M2 | TEMPERATURE: 98.2 F | HEART RATE: 80 BPM | DIASTOLIC BLOOD PRESSURE: 86 MMHG | SYSTOLIC BLOOD PRESSURE: 138 MMHG | RESPIRATION RATE: 14 BRPM | WEIGHT: 160 LBS

## 2022-11-01 DIAGNOSIS — Z23 FLU VACCINE NEED: ICD-10-CM

## 2022-11-01 DIAGNOSIS — I48.19 PERSISTENT ATRIAL FIBRILLATION (HCC): ICD-10-CM

## 2022-11-01 DIAGNOSIS — E53.8 LOW VITAMIN B12 LEVEL: ICD-10-CM

## 2022-11-01 DIAGNOSIS — Z12.11 SCREEN FOR COLON CANCER: Primary | ICD-10-CM

## 2022-11-01 DIAGNOSIS — I48.20 CHRONIC ATRIAL FIBRILLATION (HCC): ICD-10-CM

## 2022-11-01 DIAGNOSIS — N40.0 ENLARGED PROSTATE: ICD-10-CM

## 2022-11-01 DIAGNOSIS — E11.69 HYPERLIPIDEMIA ASSOCIATED WITH TYPE 2 DIABETES MELLITUS (HCC): ICD-10-CM

## 2022-11-01 DIAGNOSIS — I50.42 CHRONIC COMBINED SYSTOLIC AND DIASTOLIC HEART FAILURE (HCC): ICD-10-CM

## 2022-11-01 DIAGNOSIS — R73.09 ELEVATED HEMOGLOBIN A1C: ICD-10-CM

## 2022-11-01 DIAGNOSIS — M81.8 IDIOPATHIC OSTEOPOROSIS: ICD-10-CM

## 2022-11-01 DIAGNOSIS — E78.5 HYPERLIPIDEMIA ASSOCIATED WITH TYPE 2 DIABETES MELLITUS (HCC): ICD-10-CM

## 2022-11-01 DIAGNOSIS — C91.10 CHRONIC LYMPHOCYTIC LEUKEMIA (HCC): ICD-10-CM

## 2022-11-01 DIAGNOSIS — D32.9 MENINGIOMA (HCC): ICD-10-CM

## 2022-11-01 DIAGNOSIS — I10 ESSENTIAL HYPERTENSION, MALIGNANT: ICD-10-CM

## 2022-11-01 DIAGNOSIS — I48.91 RAPID ATRIAL FIBRILLATION (HCC): ICD-10-CM

## 2022-11-01 LAB — SL AMB POCT HEMOGLOBIN AIC: 5.8 (ref ?–6.5)

## 2022-11-01 RX ORDER — DIGOXIN 125 MCG
125 TABLET ORAL DAILY
Qty: 45 TABLET | Refills: 3 | Status: SHIPPED | OUTPATIENT
Start: 2022-11-01

## 2022-11-01 RX ORDER — TAMSULOSIN HYDROCHLORIDE 0.4 MG/1
0.4 CAPSULE ORAL
Qty: 90 CAPSULE | Refills: 3 | Status: SHIPPED | OUTPATIENT
Start: 2022-11-01

## 2022-11-01 RX ORDER — NEBIVOLOL 10 MG/1
10 TABLET ORAL DAILY
Qty: 90 TABLET | Refills: 3 | Status: SHIPPED | OUTPATIENT
Start: 2022-11-01

## 2022-11-01 RX ORDER — VALSARTAN 40 MG/1
40 TABLET ORAL DAILY
Qty: 180 TABLET | Refills: 3 | Status: SHIPPED | OUTPATIENT
Start: 2022-11-01

## 2022-11-01 NOTE — PROGRESS NOTES
BMI Counseling: There is no height or weight on file to calculate BMI  The BMI is above normal  Nutrition recommendations include 3-5 servings of fruits/vegetables daily  Applied

## 2022-11-01 NOTE — PROGRESS NOTES
Name: Ren Peña      : 1945      MRN: [id number]  Encounter Provider: Sherrie Chu MD  Encounter Date: 2022   Encounter department: [address] [place] [address]     1  Screen for colon cancer  -     Ambulatory referral for colonoscopy; Future  -     Occult Blood, Fecal Immunochemical; Future    2  Rapid atrial fibrillation (HCC)  -     digoxin (LANOXIN) 0 125 mg tablet; Take 1 tablet (125 mcg total) by mouth daily  -     apixaban (Eliquis) 5 mg; Take 1 tablet (5 mg total) by mouth 2 (two) times a day    3  Meningioma ([address] )    4  Persistent atrial fibrillation ([facility] )    5  Chronic lymphocytic leukemia ([facility] )    6  Essential hypertension, malignant  -     nebivolol (Bystolic) 10 mg tablet; Take 1 tablet (10 mg total) by mouth daily    7  Chronic atrial fibrillation (HCC)  -     valsartan (DIOVAN) 40 mg tablet; Take 1 tablet (40 mg total) by mouth daily  -     Digoxin level; Future    8  Chronic combined systolic and diastolic heart failure (HCC)  -     valsartan (DIOVAN) 40 mg tablet; Take 1 tablet (40 mg total) by mouth daily    9  Enlarged prostate  -     tamsulosin (Flomax) 0 4 mg; Take 1 capsule (0 4 mg total) by mouth daily with dinner  -     PSA Total, Diagnostic; Future    10  Idiopathic osteoporosis  -     Vitamin D 25 hydroxy; Future; Expected date: 2022    11  Low vitamin B12 level  -     Vitamin B12; Future    12  Elevated hemoglobin A1c  -     POCT hemoglobin A1c  -     UA (URINE) with reflex to Scope; Future; Expected date: 2022  -     Comprehensive metabolic panel; Future  -     CBC and differential; Future  -     Magnesium; Future    13  Flu vaccine need  -     influenza vaccine, high-dose, PF 0 7 mL (FLUZONE HIGH-DOSE)    14  Hyperlipidemia associated with type 2 diabetes mellitus ([facility] [address] )  -     Lipid panel;  Future    Continue and Renew same meds  FU w hematology  FU w cardiology  RTC in 3 mos w Blood work       Subjective      76 Y O Man is here for Regular check Up, he feels ok, No recent Blood work, med list reviewed w  Pt, No New symptoms    Review of Systems   Constitutional: Negative for chills, fatigue and fever  HENT: Negative for congestion, facial swelling, sore throat, trouble swallowing and voice change  Eyes: Negative for pain, discharge and visual disturbance  Respiratory: Negative for cough, shortness of breath and wheezing  Cardiovascular: Negative for chest pain, palpitations and leg swelling  Gastrointestinal: Negative for abdominal pain, blood in stool, constipation, diarrhea and nausea  Endocrine: Negative for polydipsia, polyphagia and polyuria  Genitourinary: Negative for difficulty urinating, hematuria and urgency  Musculoskeletal: Negative for arthralgias and myalgias  Skin: Negative for rash  Neurological: Negative for dizziness, tremors, weakness and headaches  Hematological: Negative for adenopathy  Does not bruise/bleed easily  Psychiatric/Behavioral: Negative for dysphoric mood, sleep disturbance and suicidal ideas         Current Outpatient Medications on File Prior to Visit   Medication Sig   • Diclofenac Sodium  MG 24 hr tablet Take 1 tablet (100 mg total) by mouth daily With food/Lunch   • Imbruvica 140 MG CAPS Take 1 capsule (140 mg total) by mouth 2 (two) times a day (Patient taking differently: Take 140 mg by mouth 2 (two) times a day Patient states he takes this once daily)   • [DISCONTINUED] digoxin (LANOXIN) 0 125 mg tablet Take 1 tablet (125 mcg total) by mouth daily   • [DISCONTINUED] Eliquis 5 MG take 1 tablet by mouth twice a day (Patient taking differently: Patient states he has been taking this just once daily)   • [DISCONTINUED] nebivolol (Bystolic) 10 mg tablet Take 1 tablet (10 mg total) by mouth daily   • [DISCONTINUED] tamsulosin (Flomax) 0 4 mg Take 1 capsule (0 4 mg total) by mouth daily with dinner   • [DISCONTINUED] valsartan (DIOVAN) 40 mg tablet Take 1 tablet (40 mg total) by mouth daily       Objective     /86 (BP Location: Left arm, Patient Position: Sitting, Cuff Size: Standard)   Pulse 80   Temp 98 2 °F (36 8 °C) (Tympanic)   Resp 14   Ht 5' 6" (1 676 m)   Wt 72 6 kg (160 lb)   SpO2 99%   BMI 25 82 kg/m²     Physical Exam  Constitutional:       General: He is not in acute distress  HENT:      Head: Normocephalic  Mouth/Throat:      Pharynx: No oropharyngeal exudate  Eyes:      General: No scleral icterus  Conjunctiva/sclera: Conjunctivae normal       Pupils: Pupils are equal, round, and reactive to light  Neck:      Thyroid: No thyromegaly  Cardiovascular:      Rate and Rhythm: Normal rate and regular rhythm  Heart sounds: Normal heart sounds  No murmur heard  Pulmonary:      Effort: Pulmonary effort is normal  No respiratory distress  Breath sounds: Normal breath sounds  No wheezing or rales  Abdominal:      General: Bowel sounds are normal  There is no distension  Palpations: Abdomen is soft  Tenderness: There is no abdominal tenderness  There is no guarding or rebound  Musculoskeletal:         General: No tenderness  Cervical back: Neck supple  Lymphadenopathy:      Cervical: No cervical adenopathy  Skin:     Coloration: Skin is not pale  Findings: No rash  Neurological:      Mental Status: He is alert and oriented to person, place, and time  Sensory: No sensory deficit  Motor: No weakness         Tootie Nazario MD

## 2022-11-01 NOTE — PATIENT INSTRUCTIONS
Osteoporosis Education   Osteoporosis  is a long-term medical condition that causes your bones to become weak, brittle, and more likely to fracture  Osteoporosis occurs when your body absorbs more bone than it makes  It is also caused by a lack of calcium and estrogen (female hormone)  Common symptoms include the following: You may not have any signs or symptoms  You may break a bone after a muscle strain, bump, or fall  A break usually occurs in the hip, spine, or wrist  A collapsed vertebra (bone in your spine) may cause severe back pain or loss of height from bent posture  Call your doctor if:   ·  You have severe pain  ·  You have increasing pain after a fall  ·  You have pain when you do your daily activities  ·  You have questions or concerns about your condition or care  Diagnosis of osteoporosis:   · Blood and urine tests  measure your calcium, vitamin D, and estrogen levels  · An x-ray or CT may show thinned bones or a fracture  You may be given contrast liquid to help the bones show up better in the pictures  Tell the healthcare provider if you have ever had an allergic reaction to contrast liquid  Do not enter the MRI room with anything metal  Metal can cause serious injury  Tell the healthcare provider if you have any metal in or on your body  · A bone density test  compares your bone thickness with what is expected for someone of your age, gender, and ethnicity  Treatment for osteoporosis may include medicines to prevent bone loss, build new bone, and increase estrogen  These medicines help prevent fractures and may be given as a pill or injection  Ask your healthcare provider for more information on these medicines  Prevent bone loss:  · Eat healthy foods that are high in calcium  This helps keep your bones strong  Good sources of calcium are milk, cheese, broccoli, tofu, almonds, and canned salmon and sardines  Recommended to get at least 1200mg daily of calcium    · Increase your vitamin D intake  Vitamin D is in fish oils, some vegetables, and fortified milk, cereal, and bread  Vitamin D is also formed in the skin when it is exposed to the sun  Ask your healthcare provider how much sunlight is safe for you  You will require at least 800 units of vitamin D daily taken as a supplement  · Drink liquids as directed  Ask your healthcare provider how much liquid to drink each day and which liquids are best for you  Do not have alcohol or caffeine  They decrease bone mineral density, which can weaken your bones  · Exercise regularly  Ask your healthcare provider about the best exercise plan for you  Weight bearing exercise for 30 minutes, 3 times a week can help build and strengthen bone  · Do not smoke  Nicotine and other chemicals in cigarettes and cigars can cause lung damage  Ask your healthcare provider for information if you currently smoke and need help to quit  E-cigarettes or smokeless tobacco still contain nicotine  Talk to your healthcare provider before you use these products  · Go to physical therapy as directed  A physical therapist teaches you exercises to help improve movement and muscle strength  · Alcohol  It is recommended to avoid heavy alcohol use as increased consumption of alcohol is known to cause bone loss  © Copyright [employer] 2021 Information is for End User's use only and may not be sold, redistributed or otherwise used for commercial purposes  All illustrations and images included in CareNotes® are the copyrighted property of [employer]  or [facility]     Calcium and vitamin D for bone health (Beyond the Basics)    CALCIUM AND VITAMIN D OVERVIEW  Osteoporosis is a common bone disorder that causes a progressive loss in bone density and mass  As a result, bones become thin, weakened, and easily fractured   It is estimated that more than 1 3 million osteoporosis-associated (or "osteoporotic") fractures occur every year in the [place], primarily of bone within the spine (the vertebrae), the hip, and the forearm near the wrist  =    A number of treatments can help to prevent loss of bone and treat low bone mass  However, the first step in preventing or treating osteoporosis is to consume foods and drinks that provide calcium, a mineral essential for bone strength, and vitamin D, which aids in calcium breakdown and absorption  =    CALCIUM AND VITAMIN D BENEFITS  Good nutrition is important at all ages to keep the bones healthy  · Taking calcium reduces bone loss and decreases the risk of fracturing the vertebrae (the bones that surround the spinal cord)  · Consuming calcium during childhood (eg, in milk) can lead to higher bone mass in adulthood  This increase in bone density can reduce the risk of fractures later in life  · Calcium may also have benefits in other body systems by reducing blood pressure and cholesterol levels  · Calcium and vitamin D supplements may help prevent tooth loss in older adults  RECOMMENDATIONS FOR CALCIUM    General recommendations -- Premenopausal women and men should consume at least 1000 mg of calcium, while postmenopausal women should consume 1200 mg (total diet plus supplement)  You should not consume more than 2000 mg of calcium per day (total diet plus supplement) due to the risk of side effects  Calcium in the diet -- The primary sources of calcium in the diet include milk and other dairy products, such as hard cheese, cottage cheese, or yogurt, as well as green vegetables, such as kale and broccoli (table 1)  Some cereals, soy products, and fruit juices are fortified with calcium  Calcium supplements -- The body is able to absorb calcium contained in supplements as well as from dietary sources  If it is not possible to get enough calcium from dietary sources, consult a health care provider to determine the best type, dose, and timing of calcium supplements       · Calcium carbonate is effective and is the least expensive form of calcium  It is best absorbed with a low-iron meal (such as breakfast)  Calcium carbonate may not be absorbed well in people who also take a specific medication for gastroesophageal reflux (called a proton pump inhibitor or H2 blocker), which blocks stomach acid  · Many natural calcium carbonate preparations such as oyster shells or bone meal contain some lead  · Calcium citrate is well absorbed in the fasting state as well as with a meal   · Calcium doses above 500 mg are not absorbed as well as smaller doses, so large doses of supplements should be taken in divided doses (eg, in the morning and evening)  · Calcium supplements do not replace other osteoporosis treatments such as hormone replacement, bisphosphonates (eg, risedronate [sample brand name: Actonel] and alendronate [brand name: Fosamax]), and raloxifene (brand name: Evista)  Calcium and vitamin D supplements alone are usually insufficient to prevent age-related bone loss, although they may be beneficial in some subgroups (older adults, those with very low intake)  In most patients with or at risk for osteoporosis, the addition of medication or hormonal therapy is necessary in order to slow the breakdown and removal of bone (ie, resorption)  Underlying gastrointestinal diseases -- Patients who do not adequately absorb nutrients from the gastrointestinal tract (due to malabsorption) may require more than 1000 to 1200 mg of calcium per day  In such cases, a health care provider can help to determine the optimal dose of calcium  Medications -- All medications should be discussed with a health care provider to ensure that possible interactions with calcium are identified  Certain medications change the amount of calcium that is absorbed and/or excreted  As an example, loop diuretics (eg, furosemide [sample brand name: Lasix]) increase the amount of calcium excreted in the urine      On the other hand, thiazide diuretics (eg, hydrochlorothiazide) can reduce levels of calcium in the urine, potentially reducing the risk of bone loss and kidney stones  Side effects of calcium -- Calcium is usually easily tolerated when it is taken in divided doses several times per day  Some people experience side effects related to calcium, including constipation and indigestion  Calcium supplements interfere with the absorption of iron and thyroid hormone, and therefore, these medications should be taken at different times  Kidney stones -- There is no evidence that consuming large amounts of calcium (from foods and drinks) increases the risk of kidney stones, or that avoiding dietary calcium decreases the risk  In fact, avoiding dairy products is likely to increase the risk of kidney stones  However, use of calcium supplements may increase the risk of kidney stones in susceptible individuals by raising the level of calcium in the urine  This is particularly true if the supplement is taken between meals or at bedtime, and this is one of the reasons we prefer for patients to get as much of their calcium requirement through dietary means  IMPORTANCE OF VITAMIN D  Vitamin D decreases bone loss and lowers the risk of fracture, especially in older men and women  Along with calcium, vitamin D also helps to prevent and treat osteoporosis  To absorb calcium efficiently, an adequate amount of vitamin D must be present  Vitamin D is normally made in the skin after exposure to sunlight  Recommendations for vitamin D -- The current recommendation is that men over 70 years and postmenopausal women consume at least 800 international units (20 micrograms) of vitamin D per day  Lower levels of vitamin D are not as effective, while high doses can be toxic, especially if taken for long periods of time   Although the optimal intake has not been clearly established in premenopausal women or in younger men with osteoporosis, 600 international units (15 micrograms) of vitamin D daily is generally suggested  Vitamin D is available as an individual supplement and is included in most multivitamins and some calcium supplements (table 2)  Milk is a relatively good dietary source of vitamin D, with approximately 100 international units (2 5 micrograms) per cup (240 mL), and salmon has 800 to 1000 international units (20 to 25 micrograms) of vitamin D per serving  Most healthy people don't need to check with their health care provider before taking standard doses of vitamin D and don't need monitoring of vitamin D levels if they are not being treated for vitamin D deficiency  However, recommendations for vitamin D supplementation may be different in people with certain underlying medical conditions, such as sarcoidosis or lymphoma  In these situations, a provider will determine if supplements are needed; if so, the person's vitamin D and calcium levels should be monitored with regular tests  ©8092 [employer], [address] rights reserved

## 2023-03-06 ENCOUNTER — RA CDI HCC (OUTPATIENT)
Dept: OTHER | Facility: HOSPITAL | Age: 78
End: 2023-03-06

## 2023-03-06 NOTE — PROGRESS NOTES
[facility]  coding opportunities        I11 0    Diabetes? ?  Chart Reviewed number of suggestions sent to Provider: 1     Patients Insurance     Medicare Insurance: Medicare

## 2023-03-08 ENCOUNTER — OFFICE VISIT (OUTPATIENT)
Dept: CARDIOLOGY CLINIC | Facility: CLINIC | Age: 78
End: 2023-03-08

## 2023-03-08 VITALS
DIASTOLIC BLOOD PRESSURE: 70 MMHG | BODY MASS INDEX: 25.84 KG/M2 | HEIGHT: 66 IN | SYSTOLIC BLOOD PRESSURE: 112 MMHG | WEIGHT: 160.8 LBS | HEART RATE: 78 BPM

## 2023-03-08 DIAGNOSIS — E78.5 HYPERLIPIDEMIA ASSOCIATED WITH TYPE 2 DIABETES MELLITUS (HCC): ICD-10-CM

## 2023-03-08 DIAGNOSIS — I10 PRIMARY HYPERTENSION: ICD-10-CM

## 2023-03-08 DIAGNOSIS — C91.10 CHRONIC LYMPHOCYTIC LEUKEMIA (HCC): ICD-10-CM

## 2023-03-08 DIAGNOSIS — I27.20 MILD PULMONARY HYPERTENSION (HCC): ICD-10-CM

## 2023-03-08 DIAGNOSIS — I34.0 MODERATE MITRAL VALVE REGURGITATION: ICD-10-CM

## 2023-03-08 DIAGNOSIS — E11.69 HYPERLIPIDEMIA ASSOCIATED WITH TYPE 2 DIABETES MELLITUS (HCC): ICD-10-CM

## 2023-03-08 DIAGNOSIS — I48.19 PERSISTENT ATRIAL FIBRILLATION (HCC): Primary | ICD-10-CM

## 2023-03-08 DIAGNOSIS — I42.8 NONISCHEMIC CARDIOMYOPATHY (HCC): ICD-10-CM

## 2023-03-08 DIAGNOSIS — I73.9 PERIPHERAL VASCULAR DISEASE (HCC): ICD-10-CM

## 2023-03-08 NOTE — PATIENT INSTRUCTIONS
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  Echo complete w/ contrast if indicated      3  Moderate mitral valve regurgitation        4  Mild pulmonary hypertension ([facility] [address] )        5  Peripheral vascular disease ([facility] )        6  Hyperlipidemia associated with type 2 diabetes mellitus ([facility] )        7  Primary hypertension        8  Chronic lymphocytic leukemia ([facility] )          Persistent atrial fibrillation [facility])  Mr Rima Skaggs has longstanding persistent atrial fibrillation  He is on lower dose of Eliquis due to excessive bruising and some bleeding on therapeutic doses  He has no history of TIA/CVA  He is asymptomatic and his blood pressure is well controlled  Physical examination does not reveal signs of pulmonary or peripheral vascular congestion  Renal function and electrolytes are stable  He is low on low-dose of digoxin and digoxin level last year was normal   His echocardiogram in 2021 showed EF of 40%  -- I am advising him to continue current medications  -- We repeat echocardiogram prior to next visit in 8 months  -- He is encouraged to continue normal activities  -- Dietary and medical compliance are reinforced  -- He is advised  to report any concerning symptoms such as chest pain, shortness of breath, decline in exercise tolerance or presyncope/syncope

## 2023-03-08 NOTE — PROGRESS NOTES
[facility]  [address] [place] [address], [place]re Fiore [address]  Phone#  [phone]  Fax#  [phone]  *-*-*-*-*-*-*-*-*-*-*-*-*-*-*-*-*-*-*-*-*-*-*-*-*-*-*-*-*-*-*-*-*-*-*-*-*-*-*-*-*-*-*-*-*-*-*-*-*-*-*-*-*-*  Anitra Marshall DATE: 03/08/23 10:09 AM  PATIENT NAME: Wolfgang Arnold   1945    [id number]  AGE:77 y o  SEX: male  ENCOUNTER PROVIDER:Mely Sanchez     PRIMARY CARE PHYSICIAN: Doug Solitario MD    DIAGNOSES:  1  Atrial flutter and atrial fibrillation (initially diagnosed February 2020)- status post cardioversion in June 2020 followed by recurrence, now persistent atrial fibrillation  2  Chronic lymphocytic leukemia initially diagnosed in 2013, now with relapse, currently on ibrutinib and rituximab  3  Intracranial meningioma along the undersurface of right cerebellar tentorium diagnosed in 2017  4  Degenerative joint disease with arthritis  5  Chronic gout  6  Benign prostatic hypertrophy  7  Essential hypertension  8  Cardiomyopathy, unspecified, heart failure with mid range ejection fraction, EF 46 percent  9  Mitral valve regurgitation  10  Pulmonary hypertension    ECHOCARDIOGRAM August 2021: Dilated left ventricular cavity, LVIDd 5 8 cm, mild to moderately reduced left ventricular systolic function with global hypokinesis, EF 40%, mild left atrial cavity enlargement, bowing interatrial septum towards right side, mild-to-moderate mitral valve regurgitation, mild-to-moderate tricuspid valve regurgitation, trace pulmonic valve regurgitation  HOLTER MONITOR August 2021 :  Patient was in atrial fibrillation rhythm throughout with average heart rate of 78 and minimum heart rate of 40 and maximum heart rate of 178 beats per minute  There was no significant bradyarrhythmia or pause events  There were rare ventricular ectopic beats      ECHOCARDIOGRAM March 3, 2020, mildly reduced left ventricular systolic function with global hypokinesis, EF of 46 percent, mild left ventricular cavity enlargement, indeterminate diastolic dysfunction, mild left atrial cavity enlargement, moderate mitral valve regurgitation, mild mitral annular calcification, mild to moderate tricuspid valve and trace pulmonic valve regurgitation, mild pulmonary hypertension with estimated RVSP/PASP 42 mmHg, no pericardial effusion  CURRENT ECG     No results found for this visit on 03/08/23  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  Echo complete w/ contrast if indicated      3  Moderate mitral valve regurgitation        4  Mild pulmonary hypertension ([facility] )        5  Peripheral vascular disease ([facility] )        6  Hyperlipidemia associated with type 2 diabetes mellitus ([facility] )        7  Primary hypertension        8  Chronic lymphocytic leukemia ([facility] )          Persistent atrial fibrillation [facility])  Mr Dalton Jovel has longstanding persistent atrial fibrillation  He is on lower dose of Eliquis due to excessive bruising and some bleeding on therapeutic doses  He has no history of TIA/CVA  He is asymptomatic and his blood pressure is well controlled  Physical examination does not reveal signs of pulmonary or peripheral vascular congestion  Renal function and electrolytes are stable  He is low on low-dose of digoxin and digoxin level last year was normal   His echocardiogram in 2021 showed EF of 40%  -- I am advising him to continue current medications  -- We repeat echocardiogram prior to next visit in 8 months  -- He is encouraged to continue normal activities  -- Dietary and medical compliance are reinforced  -- He is advised  to report any concerning symptoms such as chest pain, shortness of breath, decline in exercise tolerance or presyncope/syncope        INTERVAL HISTORY & HISTORY OF PRESENT ILLNESS     Dalton Jovel is here for follow-up regarding his cardiac comorbidities which include:  Atrial flutter and atrial fibrillation, cardiomyopathy, hypertension and other comorbidities  He was last seen by me in August 2022  [facility] He is here for follow-up visit accompanied with his wife  He reports that since last visit he has had no new diagnoses or hospitalizations or other significant illnesses  From a symptom perspective he denies any recent chest pains or palpitations or unusual shortness of breath or dizziness or lightheadedness or passing out or near passing out episodes  Denies any recent falls  Denies any excessive bruising or bleeding  Continues to receive treatment for CLL and currently is on single agent with ibrutinib  Follows with Dr Aisha Blanchard at [facility]  Things are relatively stable  Functional capacity status:  Good   (Excellent- >10 METs; Good: (7-10 METs); Moderate (4-7 METs); Poor (<= 4 METs)    Any chronic stressors:  None   (feeling of poor health, financial problems, and social isolation etc)  Tobacco or alcohol dependence:  He has never been a smoker  He has 1 alcoholic drink a week    Current cardiac medications:  Valsartan 40 mg daily, nebivolol 10 mg daily, digoxin 125 mcg daily  He takes Eliquis 5 mg only once a day  Blood work from January 2023 is significant for sodium 140 potassium 4 4 chloride 107 bicarb 30 BUN 11 creatinine 0 95 decreased total protein at 6 2, normal AST and ALT  Digoxin level 0 1 in July 2022  Lipid profile showed total cholesterol 202, triglyceride 96, HDL 62,  TSH 3 02 CBC significant for globin 15 9 hematocrit 46 3 platelet count 480    REVIEW OF SYSTEMS   Positive for:  As noted above in HPI  Negative for: All remaining as reviewed below and in HPI      SYSTEM SYMPTOMS REVIEWED:  General--weight change, fever, night sweats  Respiratory--cough, wheezing, shortness of breath, sputum production  Cardiovascular--chest pain, syncope, dyspnea on exertion, edema, decline in exercise tolerance, claudication Gastrointestinal--persistent vomiting, diarrhea, abdominal distention, blood in stool   Muscular or skeletal--joint pain or swelling   Neurologic--headaches, syncope, abnormal movement  Hematologic--history of easy bruising and bleeding   Endocrine--thyroid enlargement, heat or cold intolerance, polyuria   Psychiatric--anxiety, depression     *-*-*-*-*-*-*-*-*-*-*-*-*-*-*-*-*-*-*-*-*-*-*-*-*-*-*-*-*-*-*-*-*-*-*-*-*-*-*-*-*-*-*-*-*-*-*-*-*-*-*-*-*-*-  VITAL SIGNS     CURRENT VITAL SIGNS:   Vitals:    03/08/23 0929   BP: 112/70   BP Location: Left arm   Patient Position: Sitting   Cuff Size: Adult   Pulse: 78   Weight: 72 9 kg (160 lb 12 8 oz)   Height: 5' 6" (1 676 m)       BMI: Body mass index is 25 95 kg/m²  WEIGHTS:   Wt Readings from Last 25 Encounters:   03/08/23 72 9 kg (160 lb 12 8 oz)   11/01/22 72 6 kg (160 lb)   08/23/22 74 2 kg (163 lb 9 6 oz)   07/19/22 72 5 kg (159 lb 12 8 oz)   09/07/21 72 6 kg (160 lb)   05/13/21 70 kg (154 lb 6 4 oz)   04/26/21 71 kg (156 lb 8 4 oz)   06/12/20 71 7 kg (158 lb)   05/29/20 71 7 kg (158 lb)   05/29/20 72 7 kg (160 lb 4 8 oz)   03/03/20 72 6 kg (160 lb)   02/27/20 72 2 kg (159 lb 3 2 oz)   02/06/20 73 9 kg (163 lb)   01/28/20 74 1 kg (163 lb 6 4 oz)   01/23/20 73 5 kg (162 lb)   01/05/20 72 1 kg (159 lb)   12/20/19 73 kg (161 lb)   12/13/19 72 7 kg (160 lb 4 8 oz)   11/27/19 72 7 kg (160 lb 4 8 oz)   09/12/19 72 1 kg (159 lb)   10/05/18 75 3 kg (166 lb)   08/31/18 73 9 kg (163 lb)   07/19/18 73 9 kg (163 lb)   07/16/18 75 4 kg (166 lb 3 2 oz)   07/09/18 76 2 kg (168 lb)        *-*-*-*-*-*-*-*-*-*-*-*-*-*-*-*-*-*-*-*-*-*-*-*-*-*-*-*-*-*-*-*-*-*-*-*-*-*-*-*-*-*-*-*-*-*-*-*-*-*-*-*-*-*-  PHYSICAL EXAM     General Appearance:    Alert, cooperative, no distress, appears stated age   Head, Eyes, ENT:     Normal , moist mucous mebranes  Neck:   Supple, no carotid bruit or JVD   Back:     Symmetric, no curvature  Lungs:     Respirations unlabored   Clear to auscultation bilaterally,    Chest wall:    No tenderness or deformity   Heart:     Irregularly irregular rhythm, S1 and S2 normal, no murmur, rub  or gallop  Abdomen:     Soft, non-tender, No obvious masses, or organomegaly   Extremities:   Extremities warm, no cyanosis or edema    Skin:   mild venostatic changes in lower extremities    Minor bruising noted in upper extremities     *-*-*-*-*-*-*-*-*-*-*-*-*-*-*-*-*-*-*-*-*-*-*-*-*-*-*-*-*-*-*-*-*-*-*-*-*-*-*-*-*-*-*-*-*-*-*-*-*-*-*-*-*-*-  CURRENT MEDICATIONS LIST     Current Outpatient Medications:   •  apixaban (Eliquis) 5 mg, Take 1 tablet (5 mg total) by mouth 2 (two) times a day, Disp: 180 tablet, Rfl: 3  •  Diclofenac Sodium  MG 24 hr tablet, Take 1 tablet (100 mg total) by mouth daily With food/Lunch, Disp: 30 tablet, Rfl: 3  •  digoxin (LANOXIN) 0 125 mg tablet, Take 1 tablet (125 mcg total) by mouth daily, Disp: 45 tablet, Rfl: 3  •  Imbruvica 140 MG CAPS, Take 1 capsule (140 mg total) by mouth 2 (two) times a day (Patient taking differently: Take 140 mg by mouth 2 (two) times a day Patient states he takes this once daily), Disp: 180 capsule, Rfl: 3  •  nebivolol (Bystolic) 10 mg tablet, Take 1 tablet (10 mg total) by mouth daily, Disp: 90 tablet, Rfl: 3  •  tamsulosin (Flomax) 0 4 mg, Take 1 capsule (0 4 mg total) by mouth daily with dinner, Disp: 90 capsule, Rfl: 3  •  valsartan (DIOVAN) 40 mg tablet, Take 1 tablet (40 mg total) by mouth daily, Disp: 180 tablet, Rfl: 3       ALLERGIES     Allergies   Allergen Reactions   • Tetanus Antitoxin Rash     Patient told by mother medication was given as a child   • Tetanus Toxoid    • Allopurinol Rash       *-*-*-*-*-*-*-*-*-*-*-*-*-*-*-*-*-*-*-*-*-*-*-*-*-*-*-*-*-*-*-*-*-*-*-*-*-*-*-*-*-*-*-*-*-*-*-*-*-*-*-*-*-*-  LABORATORY DATA   No results found for: NA  Potassium   Date Value Ref Range Status   06/08/2020 4 5 3 5 - 5 3 mmol/L Final   01/30/2020 4 8 3 5 - 5 3 mmol/L Final   01/05/2020 4 1 3 5 - 5 3 mmol/L Final Chloride   Date Value Ref Range Status   06/08/2020 106 100 - 108 mmol/L Final   01/30/2020 106 100 - 108 mmol/L Final   01/05/2020 105 100 - 108 mmol/L Final     CO2   Date Value Ref Range Status   06/08/2020 28 21 - 32 mmol/L Final   01/30/2020 30 21 - 32 mmol/L Final   01/05/2020 30 21 - 32 mmol/L Final     BUN   Date Value Ref Range Status   06/08/2020 12 5 - 25 mg/dL Final   01/30/2020 16 5 - 25 mg/dL Final   01/05/2020 18 5 - 25 mg/dL Final     Creatinine   Date Value Ref Range Status   06/08/2020 1 00 0 60 - 1 30 mg/dL Final     Comment:     Standardized to IDMS reference method   01/30/2020 1 02 0 60 - 1 30 mg/dL Final     Comment:     Standardized to IDMS reference method   01/05/2020 0 99 0 60 - 1 30 mg/dL Final     Comment:     Standardized to IDMS reference method     eGFR   Date Value Ref Range Status   06/08/2020 74 ml/min/1 73sq m Final   01/30/2020 73 ml/min/1 73sq m Final   01/05/2020 75 ml/min/1 73sq m Final     Calcium   Date Value Ref Range Status   06/08/2020 9 2 8 3 - 10 1 mg/dL Final   01/30/2020 9 5 8 3 - 10 1 mg/dL Final   01/05/2020 9 0 8 3 - 10 1 mg/dL Final     AST   Date Value Ref Range Status   06/08/2020 17 5 - 45 U/L Final     Comment:       Specimen collection should occur prior to Sulfasalazine administration due to the potential for falsely depressed results  01/30/2020 22 5 - 45 U/L Final     Comment:       Specimen collection should occur prior to Sulfasalazine administration due to the potential for falsely depressed results  12/13/2019 18 5 - 45 U/L Final     Comment:       Specimen collection should occur prior to Sulfasalazine administration due to the potential for falsely depressed results  ALT   Date Value Ref Range Status   06/08/2020 21 12 - 78 U/L Final     Comment:       Specimen collection should occur prior to Sulfasalazine and/or Sulfapyridine administration due to the potential for falsely depressed results      01/30/2020 32 12 - 78 U/L Final Comment:       Specimen collection should occur prior to Sulfasalazine and/or Sulfapyridine administration due to the potential for falsely depressed results  12/13/2019 39 12 - 78 U/L Final     Comment:       Specimen collection should occur prior to Sulfasalazine and/or Sulfapyridine administration due to the potential for falsely depressed results  Alkaline Phosphatase   Date Value Ref Range Status   06/08/2020 97 46 - 116 U/L Final   01/30/2020 96 46 - 116 U/L Final   12/13/2019 120 (H) 46 - 116 U/L Final     Magnesium   Date Value Ref Range Status   01/30/2020 2 4 1 6 - 2 6 mg/dL Final     WBC   Date Value Ref Range Status   06/08/2020 5 36 4 31 - 10 16 Thousand/uL Final   01/30/2020 4 95 4 31 - 10 16 Thousand/uL Final   01/05/2020 4 41 4 31 - 10 16 Thousand/uL Final     Hemoglobin   Date Value Ref Range Status   06/08/2020 16 3 12 0 - 17 0 g/dL Final   01/30/2020 17 3 (H) 12 0 - 17 0 g/dL Final   01/05/2020 15 6 12 0 - 17 0 g/dL Final     Platelets   Date Value Ref Range Status   06/08/2020 242 149 - 390 Thousands/uL Final   01/30/2020 226 149 - 390 Thousands/uL Final   01/05/2020 248 149 - 390 Thousands/uL Final     No results found for: PT, PTT, INR  Digoxin Lvl   Date Value Ref Range Status   01/30/2020 0 9 0 8 - 2 0 ng/mL Final     No results found for: TSH  No results found for: CHOL   Hemoglobin A1C   Date Value Ref Range Status   11/01/2022 5 8 6 5 Final     Gram Stain Result   Date Value Ref Range Status   06/14/2022 1+ Polys  Final   06/14/2022 No bacteria seen  Final     Wound Culture   Date Value Ref Range Status   06/14/2022 2+ Growth of  Final     Comment:     Mixed Skin Jeannette       *-*-*-*-*-*-*-*-*-*-*-*-*-*-*-*-*-*-*-*-*-*-*-*-*-*-*-*-*-*-*-*-*-*-*-*-*-*-*-*-*-*-*-*-*-*-*-*-*-*-*-*-*-*-  PREVIOUS CARDIOLOGY & RADIOLOGY TEST RESULTS   I have personally reviewed pertinent results of cardiovascular tests noted below      Results for orders placed during the hospital encounter of 21    Echo complete with contrast if indicated    Narrative  [facility]  [facility]  [place], [address]  [phone]    Transthoracic Echocardiogram  2D, M-mode, Doppler, and Color Doppler    Study date:  09-Aug-2021    Patient: Ramsey Mccall  MR number: [id number]  Account number: [de-identified]  : [id number]  Age: 77 years  Gender: Male  Status: Outpatient  Location: AL Echo 3  Height: 66 in  Weight: 153 8 lb  BP: 130/ 82 mmHg    Indications: A-fib    Diagnoses: I48 0 - Atrial fibrillation    Sonographer:  Kelli Ortega, RDCS  Referring Physician:  Miko Mina MD  Group:  [facility]  Interpreting Physician:  MANPREET Shearer     SUMMARY    LEFT VENTRICLE:  The ventricle was mildly dilated to 5 8 cm internal diastolic diameter  Systolic function was mildly to moderately reduced by visual assessment  Ejection fraction was estimated to be 40 %  The rhythm was atrial fibrillation  There was mild diffuse hypokinesis  LEFT ATRIUM:  The atrium was mildly dilated  ATRIAL SEPTUM:  The septum bows from left to right, consistent with increased left atrial pressure  MITRAL VALVE:  There was mild to moderate regurgitation  TRICUSPID VALVE:  There was mild to moderate regurgitation  PULMONIC VALVE:  There was trace regurgitation  SUMMARY MEASUREMENTS  2D measurements:  Unspecified Anatomy:   %FS was 22 2 %  Ao Diam was 3 cm  Ao asc was 3 3 cm   EDV(Teich) was 164 6 ml   EF(Teich) was 44 2 %  ESV(Teich) was 91 9 ml  IVSd was 0 7 cm  LA Diam was 4 2 cm  LAAs A2C was 21 7 cm2  LAAs A4C was 21 9 cm2  LAESV A-L A2C was 71 8 ml  LAESV A-L A4C was 72 3 ml  LAESV Index (A-L) was 40 6 ml/m2  LAESV MOD A2C was 68 2 ml  LAESV MOD A4C was 61 6 ml  LAESV(A-L) was 72 6 ml  LAESV(MOD BP) was 65 1 ml  LAESVInd MOD BP was 36 4 ml/m2  LALs  A2C was 5 6 cm  LALs A4C was 5 7 cm  LVEDV MOD A4C was 122 2 ml  LVEF MOD A4C was 51 2 %  LVESV MOD A4C was 59 6 ml  LVIDd was 5 8 cm  LVIDs was 4 5 cm  LVLd A4C was 7 4 cm  LVLs A4C was 6 6 cm  LVPWd was 0 9 cm  RAAs A4C was  13 8 cm2  RAESV A-L was 36 2 ml  RAESV MOD was 35 3 ml  RALs was 4 4 cm  RVIDd was 3 6 cm  SV MOD A4C was 62 6 ml   SV(Teich) was 72 7 ml   CW measurements:  Unspecified Anatomy:   TR Vmax was 3 m/s  TR maxPG was 35 5 mmHg  MM measurements:  Unspecified Anatomy:   TAPSE was 2 3 cm  HISTORY: PRIOR HISTORY: HTN; AFIB; Pulmonary HTN; MR; PVD; HLD; CP; Suspected tachycardia associated CM    PROCEDURE: The study was performed in the AL Echo 3  This was a routine study  The transthoracic approach was used  The study included complete 2D imaging, M-mode, complete spectral Doppler, and color Doppler  The heart rate was 69 bpm, at  the start of the study  Images were obtained from the parasternal, apical, subcostal, and suprasternal notch acoustic windows  Echocardiographic views were limited due to lung interference  Image quality was adequate  LEFT VENTRICLE: The ventricle was mildly dilated to 5 8 cm internal diastolic diameter  Systolic function was mildly to moderately reduced by visual assessment  Ejection fraction was estimated to be 40 %  The rhythm was atrial  fibrillation  There was mild diffuse hypokinesis  Wall thickness was normal     RIGHT VENTRICLE: The size was normal  Systolic function was normal  Wall thickness was normal     LEFT ATRIUM: The atrium was mildly dilated  ATRIAL SEPTUM: The septum bows from left to right, consistent with increased left atrial pressure  RIGHT ATRIUM: Size was normal     MITRAL VALVE: Valve structure was normal  There was normal leaflet separation  DOPPLER: The transmitral velocity was within the normal range  There was no evidence for stenosis  There was mild to moderate regurgitation  AORTIC VALVE: The valve was trileaflet  Leaflets exhibited normal thickness, normal cuspal separation, and sclerosis   DOPPLER: Transaortic velocity was within the normal range  There was no evidence for stenosis  There was no  regurgitation  TRICUSPID VALVE: The valve structure was normal  There was normal leaflet separation  DOPPLER: The transtricuspid velocity was within the normal range  There was no evidence for stenosis  There was mild to moderate regurgitation  Estimated  peak PA pressure was 39- 44 mmHg  The findings suggest mild pulmonary hypertension  PULMONIC VALVE: Leaflets exhibited normal thickness, no calcification, and normal cuspal separation  DOPPLER: The transpulmonic velocity was within the normal range  There was no evidence for stenosis  There was trace regurgitation  PERICARDIUM: There was no pericardial effusion  The pericardium was normal in appearance  AORTA: The root exhibited normal size  The ascending aorta was normal in size  SYSTEM MEASUREMENT TABLES    2D  %FS: 22 2 %  Ao Diam: 3 cm  Ao asc: 3 3 cm  EDV(Teich): 164 6 ml  EF(Teich): 44 2 %  ESV(Teich): 91 9 ml  IVSd: 0 7 cm  LA Diam: 4 2 cm  LAAs A2C: 21 7 cm2  LAAs A4C: 21 9 cm2  LAESV A-L A2C: 71 8 ml  LAESV A-L A4C: 72 3 ml  LAESV Index (A-L): 40 6 ml/m2  LAESV MOD A2C: 68 2 ml  LAESV MOD A4C: 61 6 ml  LAESV(A-L): 72 6 ml  LAESV(MOD BP): 65 1 ml  LAESVInd MOD BP: 36 4 ml/m2  LALs A2C: 5 6 cm  LALs A4C: 5 7 cm  LVEDV MOD A4C: 122 2 ml  LVEF MOD A4C: 51 2 %  LVESV MOD A4C: 59 6 ml  LVIDd: 5 8 cm  LVIDs: 4 5 cm  LVLd A4C: 7 4 cm  LVLs A4C: 6 6 cm  LVPWd: 0 9 cm  RAAs A4C: 13 8 cm2  RAESV A-L: 36 2 ml  RAESV MOD: 35 3 ml  RALs: 4 4 cm  RVIDd: 3 6 cm  SV MOD A4C: 62 6 ml  SV(Teich): 72 7 ml    CW  TR Vmax: 3 m/s  TR maxP 5 mmHg    MM  TAPSE: 2 3 cm    [facility]    Prepared and electronically signed by    MANPREET Lane  Signed 09-Aug-2021 14:13:30    No results found for this or any previous visit  No results found for this or any previous visit      No results found for this or any previous visit  Holter monitor - 48 hour  PT NAME: Maury Carter  : 1945  AGE: 77 y o  GENDER: male  MRN: [id number]   PROCEDURE: Holter monitor - 48 hour    INDICATIONS:  Palpitations    FINDINGS:  Total beats: [id number]    Ventricular Ectopy  Total VE Beats: 22  VE percentage: <0 1%    Supraventricular Ectopy  Total SVE Beat: 0  SVE percentage: 0%    Atrial Fibrillation  AF beats: [id number]  AF percentage 100%    CONCLUSIONS:  1  Holter monitor reveals the underlying rhythm is atrial fibrillation   with an average heart rate of 78 beats per minute, a minimum heart rate of   40 beats per minute and a maximum heart rate of 178 beats per minute  2  There were about 22 ventricular ectopic beats, mostly occurring as   single PVC's with no evidence of ventricular tachycardia  Some of these   may actually be a barely conducted supraventricular beats, Dmitri beats  3  There is no evidence of significant bradyarrhythmia or advanced heart   block  The longest R to R was 2 8 seconds with afib    4  The patient's symptom diary reported leg aches while sitting which   correlated with rate controlled AFib 82 beats per minute         *-*-*-*-*-*-*-*-*-*-*-*-*-*-*-*-*-*-*-*-*-*-*-*-*-*-*-*-*-*-*-*-*-*-*-*-*-*-*-*-*-*-*-*-*-*-*-*-*-*-*-*-*-*-  SIGNATURES:   @SIG@   Mely Sanchez MD; [facility]    *-*-*-*-*-*-*-*-*-*-*-*-*-*-*-*-*-*-*-*-*-*-*-*-*-*-*-*-*-*-*-*-*-*-*-*-*-*-*-*-*-*-*-*-*-*-*-*-*-*-*-*-*-*-  PAST MEDICAL HISTORY:  Past Medical History:   Diagnosis Date   • Hypertension    • Leukemia ([address] )    • Osteoarthritis 2012    PAST SURGICAL HISTORY:  Past Surgical History:   Procedure Laterality Date   • CARDIOVERSION ([place] ONLY)  2020        • NO PAST SURGERIES     • CO ECHO TRANSESOPHAG R-T 2D W/PRB IMG ACQUISJ I&R  2020              FAMILY HISTORY:  Family History   Problem Relation Age of Onset   • Diabetes Mother    • Heart disease Father     SOCIAL HISTORY:  Social History     Tobacco Use   Smoking Status Never   Smokeless Tobacco Never   Tobacco Comments    No passive smoke exposure      Social History     Substance and Sexual Activity   Alcohol Use Yes     Social History     Substance and Sexual Activity   Drug Use No    [id number]     *-*-*-*-*-*-*-*-*-*-*-*-*-*-*-*-*-*-*-*-*-*-*-*-*-*-*-*-*-*-*-*-*-*-*-*-*-*-*-*-*-*-*-*-*-*-*-*-*-*-*-*-*-*  ALLERGIES:  Allergies   Allergen Reactions   • Tetanus Antitoxin Rash     Patient told by mother medication was given as a child   • Tetanus Toxoid    • Allopurinol Rash    CURRENT SCHEDULED MEDICATIONS:    Current Outpatient Medications:   •  apixaban (Eliquis) 5 mg, Take 1 tablet (5 mg total) by mouth 2 (two) times a day, Disp: 180 tablet, Rfl: 3  •  Diclofenac Sodium  MG 24 hr tablet, Take 1 tablet (100 mg total) by mouth daily With food/Lunch, Disp: 30 tablet, Rfl: 3  •  digoxin (LANOXIN) 0 125 mg tablet, Take 1 tablet (125 mcg total) by mouth daily, Disp: 45 tablet, Rfl: 3  •  Imbruvica 140 MG CAPS, Take 1 capsule (140 mg total) by mouth 2 (two) times a day (Patient taking differently: Take 140 mg by mouth 2 (two) times a day Patient states he takes this once daily), Disp: 180 capsule, Rfl: 3  •  nebivolol (Bystolic) 10 mg tablet, Take 1 tablet (10 mg total) by mouth daily, Disp: 90 tablet, Rfl: 3  •  tamsulosin (Flomax) 0 4 mg, Take 1 capsule (0 4 mg total) by mouth daily with dinner, Disp: 90 capsule, Rfl: 3  •  valsartan (DIOVAN) 40 mg tablet, Take 1 tablet (40 mg total) by mouth daily, Disp: 180 tablet, Rfl: 3     *-*-*-*-*-*-*-*-*-*-*-*-*-*-*-*-*-*-*-*-*-*-*-*-*-*-*-*-*-*-*-*-*-*-*-*-*-*-*-*-*-*-*-*-*-*-*-*-*-*-*-*-*-*

## 2023-03-08 NOTE — ASSESSMENT & PLAN NOTE
Mr Jennifer Mills has longstanding persistent atrial fibrillation  He is on lower dose of Eliquis due to excessive bruising and some bleeding on therapeutic doses  He has no history of TIA/CVA  He is asymptomatic and his blood pressure is well controlled  Physical examination does not reveal signs of pulmonary or peripheral vascular congestion  Renal function and electrolytes are stable  He is low on low-dose of digoxin and digoxin level last year was normal   His echocardiogram in 2021 showed EF of 40%  -- I am advising him to continue current medications  -- We repeat echocardiogram prior to next visit in 8 months  -- He is encouraged to continue normal activities  -- Dietary and medical compliance are reinforced  -- He is advised  to report any concerning symptoms such as chest pain, shortness of breath, decline in exercise tolerance or presyncope/syncope

## 2023-03-10 ENCOUNTER — OFFICE VISIT (OUTPATIENT)
Dept: FAMILY MEDICINE CLINIC | Facility: CLINIC | Age: 78
End: 2023-03-10

## 2023-03-10 VITALS
BODY MASS INDEX: 25.71 KG/M2 | HEART RATE: 69 BPM | RESPIRATION RATE: 14 BRPM | SYSTOLIC BLOOD PRESSURE: 138 MMHG | WEIGHT: 160 LBS | HEIGHT: 66 IN | DIASTOLIC BLOOD PRESSURE: 82 MMHG | OXYGEN SATURATION: 97 % | TEMPERATURE: 98.1 F

## 2023-03-10 DIAGNOSIS — I48.91 RAPID ATRIAL FIBRILLATION (HCC): ICD-10-CM

## 2023-03-10 DIAGNOSIS — I50.42 CHRONIC COMBINED SYSTOLIC AND DIASTOLIC HEART FAILURE (HCC): ICD-10-CM

## 2023-03-10 DIAGNOSIS — N40.0 ENLARGED PROSTATE: ICD-10-CM

## 2023-03-10 DIAGNOSIS — Z12.11 SCREEN FOR COLON CANCER: Primary | ICD-10-CM

## 2023-03-10 DIAGNOSIS — I48.20 CHRONIC ATRIAL FIBRILLATION (HCC): ICD-10-CM

## 2023-03-10 DIAGNOSIS — I10 ESSENTIAL HYPERTENSION, MALIGNANT: ICD-10-CM

## 2023-03-10 RX ORDER — NEBIVOLOL 10 MG/1
10 TABLET ORAL DAILY
Qty: 90 TABLET | Refills: 3 | Status: SHIPPED | OUTPATIENT
Start: 2023-03-10

## 2023-03-10 RX ORDER — DIGOXIN 125 MCG
125 TABLET ORAL DAILY
Qty: 45 TABLET | Refills: 3 | Status: SHIPPED | OUTPATIENT
Start: 2023-03-10

## 2023-03-10 RX ORDER — VALSARTAN 40 MG/1
40 TABLET ORAL DAILY
Qty: 180 TABLET | Refills: 3 | Status: SHIPPED | OUTPATIENT
Start: 2023-03-10

## 2023-03-10 NOTE — PROGRESS NOTES
Name: Krystal Martin      : 1945      MRN: [id number]  Encounter Provider: Alee Hardwick MD  Encounter Date: 3/10/2023   Encounter department: [address] [place] [address]     1  Screen for colon cancer  -     Cologuard    2  Chronic atrial fibrillation (HCC)  -     valsartan (DIOVAN) 40 mg tablet; Take 1 tablet (40 mg total) by mouth daily    3  Chronic combined systolic and diastolic heart failure (HCC)  -     valsartan (DIOVAN) 40 mg tablet; Take 1 tablet (40 mg total) by mouth daily  -     UA (URINE) with reflex to Scope; Future; Expected date: 2023  -     Comprehensive metabolic panel; Future  -     CBC and differential; Future  -     Magnesium; Future  -     Lipid panel; Future  -     PSA Total, Diagnostic; Future  -     Cologuard  -     Digoxin level; Future    4  Essential hypertension, malignant  -     nebivolol (Bystolic) 10 mg tablet; Take 1 tablet (10 mg total) by mouth daily  -     UA (URINE) with reflex to Scope; Future; Expected date: 2023  -     Comprehensive metabolic panel; Future  -     CBC and differential; Future  -     Magnesium; Future  -     Lipid panel; Future  -     PSA Total, Diagnostic; Future  -     Cologuard  -     Digoxin level; Future    5  Rapid atrial fibrillation (HCC)  -     digoxin (LANOXIN) 0 125 mg tablet; Take 1 tablet (125 mcg total) by mouth daily  -     apixaban (Eliquis) 5 mg; Take 1 tablet (5 mg total) by mouth 2 (two) times a day    6  Enlarged prostate  -     PSA Total, Diagnostic; Future    Continue and Renew Meds  FU w cardiology  FU w Hematology  RTC in 3 mos w Blood work       Subjective      69 Y O Man is here for Regular check Up, he feels [facility], recent Blood work and med list reviewed w  Pt in Detail    Review of Systems   Constitutional: Negative for chills, fatigue and fever  HENT: Negative for congestion, facial swelling, sore throat, trouble swallowing and voice change      Eyes: Negative for pain, discharge and visual disturbance  Respiratory: Negative for cough, shortness of breath and wheezing  Cardiovascular: Negative for chest pain, palpitations and leg swelling  Gastrointestinal: Negative for abdominal pain, blood in stool, constipation, diarrhea and nausea  Endocrine: Negative for polydipsia, polyphagia and polyuria  Genitourinary: Negative for difficulty urinating, hematuria and urgency  Musculoskeletal: Negative for arthralgias and myalgias  Skin: Negative for rash  Neurological: Negative for dizziness, tremors, weakness and headaches  Hematological: Negative for adenopathy  Does not bruise/bleed easily  Psychiatric/Behavioral: Negative for dysphoric mood, sleep disturbance and suicidal ideas  Current Outpatient Medications on File Prior to Visit   Medication Sig   • Diclofenac Sodium  MG 24 hr tablet Take 1 tablet (100 mg total) by mouth daily With food/Lunch   • Imbruvica 140 MG CAPS Take 1 capsule (140 mg total) by mouth 2 (two) times a day (Patient taking differently: Take 140 mg by mouth 2 (two) times a day Patient states he takes this once daily)   • tamsulosin (Flomax) 0 4 mg Take 1 capsule (0 4 mg total) by mouth daily with dinner   • [DISCONTINUED] apixaban (Eliquis) 5 mg Take 1 tablet (5 mg total) by mouth 2 (two) times a day   • [DISCONTINUED] digoxin (LANOXIN) 0 125 mg tablet Take 1 tablet (125 mcg total) by mouth daily   • [DISCONTINUED] nebivolol (Bystolic) 10 mg tablet Take 1 tablet (10 mg total) by mouth daily   • [DISCONTINUED] valsartan (DIOVAN) 40 mg tablet Take 1 tablet (40 mg total) by mouth daily       Objective     /82 (BP Location: Left arm, Patient Position: Sitting, Cuff Size: Standard)   Pulse 69   Temp 98 1 °F (36 7 °C)   Resp 14   Ht 5' 6" (1 676 m)   Wt 72 6 kg (160 lb)   SpO2 97%   BMI 25 82 kg/m²     Physical Exam  Constitutional:       General: He is not in acute distress  HENT:      Head: Normocephalic  Mouth/Throat:      Pharynx: No oropharyngeal exudate  Eyes:      General: No scleral icterus  Conjunctiva/sclera: Conjunctivae normal       Pupils: Pupils are equal, round, and reactive to light  Neck:      Thyroid: No thyromegaly  Cardiovascular:      Rate and Rhythm: Normal rate and regular rhythm  Heart sounds: Normal heart sounds  No murmur heard  Pulmonary:      Effort: Pulmonary effort is normal  No respiratory distress  Breath sounds: Normal breath sounds  No wheezing or rales  Abdominal:      General: Bowel sounds are normal  There is no distension  Palpations: Abdomen is soft  Tenderness: There is no abdominal tenderness  There is no guarding or rebound  Musculoskeletal:         General: No tenderness  Cervical back: Neck supple  Lymphadenopathy:      Cervical: No cervical adenopathy  Skin:     Coloration: Skin is not pale  Findings: No rash  Neurological:      Mental Status: He is alert and oriented to person, place, and time  Sensory: No sensory deficit  Motor: No weakness         John Nicholas MD

## 2023-05-08 ENCOUNTER — TELEPHONE (OUTPATIENT)
Dept: FAMILY MEDICINE CLINIC | Facility: CLINIC | Age: 78
End: 2023-05-08

## 2023-05-08 NOTE — TELEPHONE ENCOUNTER
Patient is taking Eliquis, and his deductible is high  Is there anything else you can change him to? Please advise

## 2023-05-08 NOTE — TELEPHONE ENCOUNTER
According to insurance Xarelto is the same $120    Please advise The patient has been examined and the H&P has been reviewed:    I concur with the findings and no changes have occurred since H&P was written.    Anesthesia/Surgery risks, benefits and alternative options discussed and understood by patient/family.          Active Hospital Problems    Diagnosis  POA    Dental caries [K02.9]  Yes      Resolved Hospital Problems   No resolved problems to display.

## 2023-05-25 DIAGNOSIS — I48.91 RAPID ATRIAL FIBRILLATION (HCC): ICD-10-CM

## 2023-06-20 ENCOUNTER — OFFICE VISIT (OUTPATIENT)
Dept: FAMILY MEDICINE CLINIC | Facility: CLINIC | Age: 78
End: 2023-06-20
Payer: MEDICARE

## 2023-06-20 VITALS
SYSTOLIC BLOOD PRESSURE: 132 MMHG | RESPIRATION RATE: 15 BRPM | TEMPERATURE: 97.4 F | OXYGEN SATURATION: 97 % | HEIGHT: 66 IN | HEART RATE: 68 BPM | WEIGHT: 160 LBS | BODY MASS INDEX: 25.71 KG/M2 | DIASTOLIC BLOOD PRESSURE: 82 MMHG

## 2023-06-20 DIAGNOSIS — D32.9 MENINGIOMA (HCC): ICD-10-CM

## 2023-06-20 DIAGNOSIS — C91.10 CHRONIC LYMPHOCYTIC LEUKEMIA (HCC): ICD-10-CM

## 2023-06-20 DIAGNOSIS — I27.20 MILD PULMONARY HYPERTENSION (HCC): ICD-10-CM

## 2023-06-20 DIAGNOSIS — I73.9 PERIPHERAL VASCULAR DISEASE (HCC): ICD-10-CM

## 2023-06-20 DIAGNOSIS — I48.19 PERSISTENT ATRIAL FIBRILLATION (HCC): Primary | ICD-10-CM

## 2023-06-20 PROBLEM — I10 BENIGN ESSENTIAL HTN: Status: RESOLVED | Noted: 2017-09-25 | Resolved: 2023-06-20

## 2023-06-20 PROCEDURE — 99214 OFFICE O/P EST MOD 30 MIN: CPT | Performed by: INTERNAL MEDICINE

## 2023-06-20 NOTE — PROGRESS NOTES
Name: Herb Brito      : 1945      MRN: [id number]  Encounter Provider: Fox Ross MD  Encounter Date: 2023   Encounter department: [address] [place] [address]     1  Persistent atrial fibrillation ([facility] [address] )    2  Mild pulmonary hypertension ([facility] [address] )    3  Peripheral vascular disease ([facility] [address] )    4  Meningioma ([facility] [address] )    5  Chronic lymphocytic leukemia ([facility] [address] )     In remission  Continue same  FU w Hematology  Renew meds  Samples of eliquis given to pt  RTC in 3 mos w Blood  work       Subjective      69 Y O Man is here for Regular check Up, he feels fair, No recent Blood work, Med list reviewed w  Pt    Review of Systems   Constitutional: Positive for fatigue  Negative for chills and fever  HENT: Positive for postnasal drip  Negative for congestion, facial swelling, sore throat, trouble swallowing and voice change  Eyes: Negative for pain, discharge and visual disturbance  Respiratory: Negative for cough, shortness of breath and wheezing  Cardiovascular: Negative for chest pain, palpitations and leg swelling  Gastrointestinal: Negative for abdominal pain, blood in stool, constipation, diarrhea and nausea  Endocrine: Negative for polydipsia, polyphagia and polyuria  Genitourinary: Negative for difficulty urinating, hematuria and urgency  Musculoskeletal: Negative for arthralgias and myalgias  Skin: Negative for rash  Neurological: Negative for dizziness, tremors, weakness and headaches  Hematological: Negative for adenopathy  Does not bruise/bleed easily  Psychiatric/Behavioral: Negative for dysphoric mood, sleep disturbance and suicidal ideas         Current Outpatient Medications on File Prior to Visit   Medication Sig   • apixaban (Eliquis) 5 mg Take 1 tablet (5 mg total) by mouth 2 (two) times a day   • Diclofenac Sodium  MG 24 hr tablet Take 1 tablet (100 mg total) by mouth daily With food/Lunch   • digoxin (LANOXIN) 0 125 mg tablet "Take 1 tablet (125 mcg total) by mouth daily   • Imbruvica 140 MG CAPS Take 1 capsule (140 mg total) by mouth 2 (two) times a day (Patient taking differently: Take 140 mg by mouth 2 (two) times a day Patient states he takes this once daily)   • nebivolol (Bystolic) 10 mg tablet Take 1 tablet (10 mg total) by mouth daily   • tamsulosin (Flomax) 0 4 mg Take 1 capsule (0 4 mg total) by mouth daily with dinner   • valsartan (DIOVAN) 40 mg tablet Take 1 tablet (40 mg total) by mouth daily       Objective     /82 (BP Location: Left arm, Patient Position: Sitting, Cuff Size: Standard)   Pulse 68   Temp (!) 97 4 °F (36 3 °C)   Resp 15   Ht 5' 6\" (1 676 m)   Wt 72 6 kg (160 lb)   SpO2 97%   BMI 25 82 kg/m²     Physical Exam  Constitutional:       General: He is not in acute distress  HENT:      Head: Normocephalic  Mouth/Throat:      Pharynx: No oropharyngeal exudate  Eyes:      General: No scleral icterus  Conjunctiva/sclera: Conjunctivae normal       Pupils: Pupils are equal, round, and reactive to light  Neck:      Thyroid: No thyromegaly  Cardiovascular:      Rate and Rhythm: Normal rate and regular rhythm  Heart sounds: Normal heart sounds  No murmur heard  Pulmonary:      Effort: Pulmonary effort is normal  No respiratory distress  Breath sounds: Normal breath sounds  No wheezing or rales  Abdominal:      General: Bowel sounds are normal  There is no distension  Palpations: Abdomen is soft  Tenderness: There is no abdominal tenderness  There is no guarding or rebound  Musculoskeletal:         General: No tenderness  Cervical back: Neck supple  Lymphadenopathy:      Cervical: No cervical adenopathy  Skin:     Coloration: Skin is not pale  Findings: No rash  Neurological:      Mental Status: He is alert and oriented to person, place, and time  Sensory: No sensory deficit  Motor: No weakness         Emily Figueroa MD  "

## 2023-06-20 NOTE — PROGRESS NOTES
BMI Counseling: Body mass index is 25 82 kg/m²  The BMI is above normal  Nutrition recommendations include decreasing overall calorie intake

## 2023-06-27 ENCOUNTER — APPOINTMENT (OUTPATIENT)
Dept: LAB | Age: 78
End: 2023-06-27
Payer: MEDICARE

## 2023-06-27 DIAGNOSIS — I10 ESSENTIAL HYPERTENSION, MALIGNANT: ICD-10-CM

## 2023-06-27 DIAGNOSIS — R55 SYNCOPE, UNSPECIFIED SYNCOPE TYPE: ICD-10-CM

## 2023-06-27 DIAGNOSIS — E78.5 HYPERLIPIDEMIA ASSOCIATED WITH TYPE 2 DIABETES MELLITUS (HCC): ICD-10-CM

## 2023-06-27 DIAGNOSIS — E11.69 HYPERLIPIDEMIA ASSOCIATED WITH TYPE 2 DIABETES MELLITUS (HCC): ICD-10-CM

## 2023-06-27 DIAGNOSIS — R79.0 LOW FERRITIN: ICD-10-CM

## 2023-06-27 DIAGNOSIS — I48.20 CHRONIC ATRIAL FIBRILLATION (HCC): ICD-10-CM

## 2023-06-27 DIAGNOSIS — E53.8 LOW VITAMIN B12 LEVEL: ICD-10-CM

## 2023-06-27 LAB
ALBUMIN SERPL BCP-MCNC: 3.5 G/DL (ref 3.5–5)
ALP SERPL-CCNC: 82 U/L (ref 46–116)
ALT SERPL W P-5'-P-CCNC: 26 U/L (ref 12–78)
ANION GAP SERPL CALCULATED.3IONS-SCNC: 1 MMOL/L
AST SERPL W P-5'-P-CCNC: 21 U/L (ref 5–45)
BASOPHILS # BLD AUTO: 0.08 THOUSANDS/ÂΜL (ref 0–0.1)
BASOPHILS NFR BLD AUTO: 1 % (ref 0–1)
BILIRUB SERPL-MCNC: 0.61 MG/DL (ref 0.2–1)
BUN SERPL-MCNC: 14 MG/DL (ref 5–25)
CALCIUM SERPL-MCNC: 9.2 MG/DL (ref 8.3–10.1)
CHLORIDE SERPL-SCNC: 110 MMOL/L (ref 96–108)
CHOLEST SERPL-MCNC: 225 MG/DL
CO2 SERPL-SCNC: 29 MMOL/L (ref 21–32)
CREAT SERPL-MCNC: 0.99 MG/DL (ref 0.6–1.3)
DIGOXIN SERPL-MCNC: <0.2 NG/ML (ref 0.8–2)
EOSINOPHIL # BLD AUTO: 0.16 THOUSAND/ÂΜL (ref 0–0.61)
EOSINOPHIL NFR BLD AUTO: 2 % (ref 0–6)
ERYTHROCYTE [DISTWIDTH] IN BLOOD BY AUTOMATED COUNT: 13.8 % (ref 11.6–15.1)
FERRITIN SERPL-MCNC: 160 NG/ML (ref 24–336)
GFR SERPL CREATININE-BSD FRML MDRD: 73 ML/MIN/1.73SQ M
GLUCOSE P FAST SERPL-MCNC: 99 MG/DL (ref 65–99)
HCT VFR BLD AUTO: 48.1 % (ref 36.5–49.3)
HDLC SERPL-MCNC: 68 MG/DL
HGB BLD-MCNC: 16.3 G/DL (ref 12–17)
IMM GRANULOCYTES # BLD AUTO: 0.05 THOUSAND/UL (ref 0–0.2)
IMM GRANULOCYTES NFR BLD AUTO: 1 % (ref 0–2)
LDLC SERPL CALC-MCNC: 141 MG/DL (ref 0–100)
LYMPHOCYTES # BLD AUTO: 1.16 THOUSANDS/ÂΜL (ref 0.6–4.47)
LYMPHOCYTES NFR BLD AUTO: 17 % (ref 14–44)
MAGNESIUM SERPL-MCNC: 2 MG/DL (ref 1.6–2.6)
MCH RBC QN AUTO: 30 PG (ref 26.8–34.3)
MCHC RBC AUTO-ENTMCNC: 33.9 G/DL (ref 31.4–37.4)
MCV RBC AUTO: 89 FL (ref 82–98)
MONOCYTES # BLD AUTO: 1.28 THOUSAND/ÂΜL (ref 0.17–1.22)
MONOCYTES NFR BLD AUTO: 19 % (ref 4–12)
NEUTROPHILS # BLD AUTO: 3.98 THOUSANDS/ÂΜL (ref 1.85–7.62)
NEUTS SEG NFR BLD AUTO: 60 % (ref 43–75)
NONHDLC SERPL-MCNC: 157 MG/DL
NRBC BLD AUTO-RTO: 0 /100 WBCS
PLATELET # BLD AUTO: 236 THOUSANDS/UL (ref 149–390)
PMV BLD AUTO: 10.4 FL (ref 8.9–12.7)
POTASSIUM SERPL-SCNC: 4.3 MMOL/L (ref 3.5–5.3)
PROT SERPL-MCNC: 6.4 G/DL (ref 6.4–8.4)
RBC # BLD AUTO: 5.43 MILLION/UL (ref 3.88–5.62)
SODIUM SERPL-SCNC: 140 MMOL/L (ref 135–147)
TRIGL SERPL-MCNC: 82 MG/DL
VIT B12 SERPL-MCNC: 1464 PG/ML (ref 180–914)
WBC # BLD AUTO: 6.71 THOUSAND/UL (ref 4.31–10.16)

## 2023-06-27 PROCEDURE — 82728 ASSAY OF FERRITIN: CPT

## 2023-06-27 PROCEDURE — 36415 COLL VENOUS BLD VENIPUNCTURE: CPT

## 2023-06-27 PROCEDURE — 80061 LIPID PANEL: CPT

## 2023-06-27 PROCEDURE — 80162 ASSAY OF DIGOXIN TOTAL: CPT

## 2023-06-27 PROCEDURE — 85025 COMPLETE CBC W/AUTO DIFF WBC: CPT

## 2023-06-27 PROCEDURE — 83735 ASSAY OF MAGNESIUM: CPT

## 2023-06-27 PROCEDURE — 82607 VITAMIN B-12: CPT

## 2023-06-27 PROCEDURE — 80053 COMPREHEN METABOLIC PANEL: CPT

## 2023-07-07 ENCOUNTER — TELEPHONE (OUTPATIENT)
Dept: FAMILY MEDICINE CLINIC | Facility: CLINIC | Age: 78
End: 2023-07-07

## 2023-07-07 DIAGNOSIS — E83.59 OTHER DISORDERS OF CALCIUM METABOLISM: ICD-10-CM

## 2023-07-07 DIAGNOSIS — E53.1 PYRIDOXINE DEFICIENCY: ICD-10-CM

## 2023-07-07 DIAGNOSIS — E11.69 HYPERLIPIDEMIA ASSOCIATED WITH TYPE 2 DIABETES MELLITUS (HCC): ICD-10-CM

## 2023-07-07 DIAGNOSIS — R53.83 OTHER FATIGUE: Primary | ICD-10-CM

## 2023-07-07 DIAGNOSIS — R53.83 OTHER FATIGUE: ICD-10-CM

## 2023-07-07 DIAGNOSIS — I48.19 PERSISTENT ATRIAL FIBRILLATION (HCC): Primary | ICD-10-CM

## 2023-07-07 DIAGNOSIS — E78.5 HYPERLIPIDEMIA ASSOCIATED WITH TYPE 2 DIABETES MELLITUS (HCC): ICD-10-CM

## 2023-07-07 RX ORDER — ROSUVASTATIN CALCIUM 5 MG/1
5 TABLET, COATED ORAL DAILY
Qty: 90 TABLET | Refills: 3 | Status: SHIPPED | OUTPATIENT
Start: 2023-07-07

## 2023-07-07 NOTE — TELEPHONE ENCOUNTER
Please review pts labs and send medications into the pharmacy for him he stated that he is still very fatigued and wants something for that as well.  Please advise - as

## 2023-08-14 ENCOUNTER — OFFICE VISIT (OUTPATIENT)
Dept: FAMILY MEDICINE CLINIC | Facility: CLINIC | Age: 78
End: 2023-08-14
Payer: MEDICARE

## 2023-08-14 VITALS
DIASTOLIC BLOOD PRESSURE: 82 MMHG | HEART RATE: 120 BPM | HEIGHT: 66 IN | SYSTOLIC BLOOD PRESSURE: 124 MMHG | BODY MASS INDEX: 25.39 KG/M2 | RESPIRATION RATE: 14 BRPM | TEMPERATURE: 98.2 F | WEIGHT: 158 LBS

## 2023-08-14 DIAGNOSIS — M1A.29X0 DRUG-INDUCED CHRONIC GOUT OF MULTIPLE SITES WITHOUT TOPHUS: ICD-10-CM

## 2023-08-14 DIAGNOSIS — C91.10 CHRONIC LYMPHOCYTIC LEUKEMIA (HCC): ICD-10-CM

## 2023-08-14 DIAGNOSIS — I27.20 MILD PULMONARY HYPERTENSION (HCC): ICD-10-CM

## 2023-08-14 DIAGNOSIS — G47.09 OTHER INSOMNIA: ICD-10-CM

## 2023-08-14 DIAGNOSIS — E53.8 LOW VITAMIN B12 LEVEL: ICD-10-CM

## 2023-08-14 DIAGNOSIS — I48.19 PERSISTENT ATRIAL FIBRILLATION (HCC): ICD-10-CM

## 2023-08-14 DIAGNOSIS — R53.83 OTHER FATIGUE: ICD-10-CM

## 2023-08-14 DIAGNOSIS — M35.3 PMR (POLYMYALGIA RHEUMATICA) (HCC): Primary | ICD-10-CM

## 2023-08-14 DIAGNOSIS — E83.39 OTHER DISORDERS OF PHOSPHORUS METABOLISM: ICD-10-CM

## 2023-08-14 DIAGNOSIS — I73.9 PERIPHERAL VASCULAR DISEASE (HCC): ICD-10-CM

## 2023-08-14 DIAGNOSIS — Z11.59 ENCOUNTER FOR SCREENING FOR OTHER VIRAL DISEASES: ICD-10-CM

## 2023-08-14 DIAGNOSIS — R22.32 SKIN LUMP OF ARM, LEFT: ICD-10-CM

## 2023-08-14 DIAGNOSIS — D32.9 MENINGIOMA (HCC): ICD-10-CM

## 2023-08-14 LAB
SL AMB  POCT GLUCOSE, UA: NORMAL
SL AMB LEUKOCYTE ESTERASE,UA: NORMAL
SL AMB POCT BILIRUBIN,UA: NORMAL
SL AMB POCT BLOOD,UA: NORMAL
SL AMB POCT CLARITY,UA: CLEAR
SL AMB POCT COLOR,UA: YELLOW
SL AMB POCT HEMOGLOBIN AIC: 5.9 (ref ?–6.5)
SL AMB POCT KETONES,UA: NORMAL
SL AMB POCT NITRITE,UA: NORMAL
SL AMB POCT PH,UA: 5.5
SL AMB POCT SPECIFIC GRAVITY,UA: 1.02
SL AMB POCT URINE PROTEIN: NORMAL
SL AMB POCT UROBILINOGEN: 0.2

## 2023-08-14 PROCEDURE — 99214 OFFICE O/P EST MOD 30 MIN: CPT | Performed by: INTERNAL MEDICINE

## 2023-08-14 PROCEDURE — 96372 THER/PROPH/DIAG INJ SC/IM: CPT | Performed by: INTERNAL MEDICINE

## 2023-08-14 PROCEDURE — 81002 URINALYSIS NONAUTO W/O SCOPE: CPT | Performed by: INTERNAL MEDICINE

## 2023-08-14 PROCEDURE — 93000 ELECTROCARDIOGRAM COMPLETE: CPT | Performed by: INTERNAL MEDICINE

## 2023-08-14 PROCEDURE — 83036 HEMOGLOBIN GLYCOSYLATED A1C: CPT | Performed by: INTERNAL MEDICINE

## 2023-08-14 RX ORDER — ALLOPURINOL 100 MG/1
100 TABLET ORAL DAILY
COMMUNITY
Start: 2023-08-07 | End: 2023-08-14 | Stop reason: SDUPTHER

## 2023-08-14 RX ORDER — PREDNISONE 5 MG/1
5 TABLET ORAL DAILY
Qty: 30 TABLET | Refills: 0 | Status: SHIPPED | OUTPATIENT
Start: 2023-08-14

## 2023-08-14 RX ORDER — PHENOL 1.4 %
10 AEROSOL, SPRAY (ML) MUCOUS MEMBRANE
Qty: 30 TABLET | Refills: 3 | Status: SHIPPED | OUTPATIENT
Start: 2023-08-14

## 2023-08-14 RX ORDER — CYANOCOBALAMIN 1000 UG/ML
1000 INJECTION, SOLUTION INTRAMUSCULAR; SUBCUTANEOUS
Status: SHIPPED | OUTPATIENT
Start: 2023-08-14

## 2023-08-14 RX ORDER — RAMELTEON 8 MG/1
8 TABLET ORAL
Qty: 90 TABLET | Refills: 3 | Status: SHIPPED | OUTPATIENT
Start: 2023-08-14 | End: 2023-08-16

## 2023-08-14 RX ORDER — ALLOPURINOL 100 MG/1
200 TABLET ORAL DAILY
Qty: 180 TABLET | Refills: 3 | Status: SHIPPED | OUTPATIENT
Start: 2023-08-14

## 2023-08-14 RX ADMIN — CYANOCOBALAMIN 1000 MCG: 1000 INJECTION, SOLUTION INTRAMUSCULAR; SUBCUTANEOUS at 16:00

## 2023-08-14 NOTE — PROGRESS NOTES
Name: Miki Zamorano      : 1945      MRN: [id number]  Encounter Provider: Vida Gillette MD  Encounter Date: 2023   Encounter department: [address] [place] [address]     1. PMR (polymyalgia rheumatica) (HCC)  -     Sedimentation rate, automated; Future  -     C-reactive protein; Future  -     predniSONE 5 mg tablet; Take 1 tablet (5 mg total) by mouth daily With Breakfast daily    2. Other insomnia  -     XR chest pa & lateral; Future; Expected date: 2023  -     Melatonin 10 MG TABS; Take 1 tablet (10 mg total) by mouth daily at bedtime  -     ramelteon (ROZEREM) 8 mg tablet; Take 1 tablet (8 mg total) by mouth daily at bedtime    3. Other fatigue  -     Mononucleosis screen; Future  -     CMV IgG/IgM Antibodies; Future  -     RPR (DX) W/REFL TITER AND CONFIRM TESTING (REFL); Future  -     Lyme Disease Serology w/Reflex; Future  -     Heavy metals, blood; Future  -     Chronic Hepatitis Panel; Future  -     Ferritin; Future  -     UA (URINE) with reflex to Scope; Future; Expected date: 2023  -     XR chest pa & lateral; Future; Expected date: 2023  -     POCT hemoglobin A1c  -     POCT urine dip  -     POCT ECG  -     cyanocobalamin injection 1,000 mcg  -     Vitamin B6; Future  -     Vitamin B1 (Thiamine), Serum/Plasma, LC/MS/MS; Future  -     CBC and differential; Future  -     Magnesium; Future  -     TSH, 3rd generation; Future; Expected date: 2023  -     T4, free; Future; Expected date: 2023  -     Vitamin B12; Future  -     Vitamin D 25 hydroxy; Future; Expected date: 2023  -     Vitamin E; Future  -     Vitamin A; Future    4. Persistent atrial fibrillation (HCC)  -     Ferritin; Future  -     UA (URINE) with reflex to Scope; Future; Expected date: 2023  -     XR chest pa & lateral; Future; Expected date: 2023    5. Chronic lymphocytic leukemia ([address])    6.  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 Other disorders of phosphorus metabolism  -     Vitamin B6; Future    8. Encounter for screening for other viral diseases  -     Chronic Hepatitis Panel; Future    9. Skin lump of arm, left  -     Ambulatory Referral to Dermatology; Future    10. Mild pulmonary hypertension ([address])    11. Peripheral vascular disease ([address])    12. Meningioma ([address])    continue same  FU w Hematology  FU w cardiology  Life style mod  RTC in 1 mo w Blood work       Subjective      69 Y O Man is here for Regular check up,and has worsening fatigue, and arthritis stiffness, Lately, recent Blood work and med list reviewed w Pt,... Review of Systems   Constitutional: Positive for fatigue. Negative for chills and fever. HENT: Positive for postnasal drip. Negative for congestion, facial swelling, sore throat, trouble swallowing and voice change. Eyes: Negative for pain, discharge and visual disturbance. Respiratory: Negative for cough, shortness of breath and wheezing. Cardiovascular: Negative for chest pain, palpitations and leg swelling. Gastrointestinal: Negative for abdominal pain, blood in stool, constipation, diarrhea and nausea. Endocrine: Negative for polydipsia, polyphagia and polyuria. Genitourinary: Negative for difficulty urinating, hematuria and urgency. Musculoskeletal: Positive for arthralgias, myalgias, neck pain and neck stiffness. Skin: Negative for rash. Neurological: Positive for dizziness. Negative for tremors, weakness and headaches. Hematological: Negative for adenopathy. Does not bruise/bleed easily. Psychiatric/Behavioral: Negative for dysphoric mood, sleep disturbance and suicidal ideas.        Current Outpatient Medications on File Prior to Visit   Medication Sig   • apixaban (Eliquis) 5 mg Take 1 tablet (5 mg total) by mouth 2 (two) times a day   • b complex-C-E-zinc (STRESS FORMULA/ZINC) tablet Take 1 tablet by mouth daily   • Diclofenac Sodium  MG 24 hr tablet Take 1 tablet (100 mg total) by mouth daily With food/Lunch   • digoxin (LANOXIN) 0.125 mg tablet Take 1 tablet (125 mcg total) by mouth daily   • Imbruvica 140 MG CAPS Take 1 capsule (140 mg total) by mouth 2 (two) times a day (Patient taking differently: Take 140 mg by mouth 2 (two) times a day Patient states he takes this once daily)   • nebivolol (Bystolic) 10 mg tablet Take 1 tablet (10 mg total) by mouth daily   • rosuvastatin (CRESTOR) 5 mg tablet Take 1 tablet (5 mg total) by mouth daily   • tamsulosin (Flomax) 0.4 mg Take 1 capsule (0.4 mg total) by mouth daily with dinner   • valsartan (DIOVAN) 40 mg tablet Take 1 tablet (40 mg total) by mouth daily   • [DISCONTINUED] allopurinol (ZYLOPRIM) 100 mg tablet Take 100 mg by mouth daily       Objective     /82 (BP Location: Left arm, Patient Position: Sitting, Cuff Size: Standard)   Pulse (!) 120   Temp 98.2 °F (36.8 °C) (Tympanic)   Resp 14   Ht 5' 6" (1.676 m)   Wt 71.7 kg (158 lb)   BMI 25.50 kg/m²     Physical Exam  Constitutional:       General: He is not in acute distress. HENT:      Head: Normocephalic. Mouth/Throat:      Pharynx: No oropharyngeal exudate. Eyes:      General: No scleral icterus. Conjunctiva/sclera: Conjunctivae normal.      Pupils: Pupils are equal, round, and reactive to light. Neck:      Thyroid: No thyromegaly. Cardiovascular:      Rate and Rhythm: Normal rate. Rhythm irregular. Heart sounds: Murmur heard. Pulmonary:      Effort: Pulmonary effort is normal. No respiratory distress. Breath sounds: Normal breath sounds. No wheezing or rales. Abdominal:      General: Bowel sounds are normal. There is no distension. Palpations: Abdomen is soft. Tenderness: There is no abdominal tenderness. There is no guarding or rebound. Musculoskeletal:         General: Tenderness present. Cervical back: Neck supple. Lymphadenopathy:      Cervical: No cervical adenopathy. Skin:     Coloration: Skin is not pale. Findings: No rash. Neurological:      Mental Status: He is alert and oriented to person, place, and time. Sensory: No sensory deficit. Motor: No weakness.        Shena Jeffers MD

## 2023-08-14 NOTE — PROGRESS NOTES
BMI Counseling: Body mass index is 25.5 kg/m². The BMI is above normal. Nutrition recommendations include reducing portion sizes.

## 2023-08-16 DIAGNOSIS — G47.09 OTHER INSOMNIA: Primary | ICD-10-CM

## 2023-08-16 RX ORDER — ESZOPICLONE 2 MG/1
2 TABLET, FILM COATED ORAL
Qty: 30 TABLET | Refills: 1 | Status: SHIPPED | OUTPATIENT
Start: 2023-08-16

## 2023-08-21 ENCOUNTER — APPOINTMENT (OUTPATIENT)
Dept: LAB | Age: 78
End: 2023-08-21
Payer: MEDICARE

## 2023-08-21 DIAGNOSIS — M35.3 PMR (POLYMYALGIA RHEUMATICA) (HCC): ICD-10-CM

## 2023-08-21 DIAGNOSIS — Z11.59 ENCOUNTER FOR SCREENING FOR OTHER VIRAL DISEASES: ICD-10-CM

## 2023-08-21 DIAGNOSIS — E83.39 OTHER DISORDERS OF PHOSPHORUS METABOLISM: ICD-10-CM

## 2023-08-21 DIAGNOSIS — R53.83 OTHER FATIGUE: ICD-10-CM

## 2023-08-21 DIAGNOSIS — I48.19 PERSISTENT ATRIAL FIBRILLATION (HCC): ICD-10-CM

## 2023-08-21 LAB
25(OH)D3 SERPL-MCNC: 45.7 NG/ML (ref 30–100)
B BURGDOR IGG+IGM SER QL IA: NEGATIVE
BASOPHILS # BLD AUTO: 0.06 THOUSANDS/ÂΜL (ref 0–0.1)
BASOPHILS NFR BLD AUTO: 1 % (ref 0–1)
BILIRUB UR QL STRIP: NEGATIVE
CLARITY UR: CLEAR
COLOR UR: NORMAL
CRP SERPL QL: 9.9 MG/L
EOSINOPHIL # BLD AUTO: 0.14 THOUSAND/ÂΜL (ref 0–0.61)
EOSINOPHIL NFR BLD AUTO: 1 % (ref 0–6)
ERYTHROCYTE [DISTWIDTH] IN BLOOD BY AUTOMATED COUNT: 14.8 % (ref 11.6–15.1)
ERYTHROCYTE [SEDIMENTATION RATE] IN BLOOD: 11 MM/HOUR (ref 0–19)
FERRITIN SERPL-MCNC: 148 NG/ML (ref 24–336)
GLUCOSE UR STRIP-MCNC: NEGATIVE MG/DL
HBV CORE AB SER QL: NORMAL
HBV CORE IGM SER QL: NORMAL
HBV SURFACE AG SER QL: NORMAL
HCT VFR BLD AUTO: 50.5 % (ref 36.5–49.3)
HCV AB SER QL: NORMAL
HETEROPH AB SER QL: NEGATIVE
HGB BLD-MCNC: 16.4 G/DL (ref 12–17)
HGB UR QL STRIP.AUTO: NEGATIVE
IMM GRANULOCYTES # BLD AUTO: 0.04 THOUSAND/UL (ref 0–0.2)
IMM GRANULOCYTES NFR BLD AUTO: 0 % (ref 0–2)
KETONES UR STRIP-MCNC: NEGATIVE MG/DL
LEUKOCYTE ESTERASE UR QL STRIP: NEGATIVE
LYMPHOCYTES # BLD AUTO: 0.62 THOUSANDS/ÂΜL (ref 0.6–4.47)
LYMPHOCYTES NFR BLD AUTO: 6 % (ref 14–44)
MAGNESIUM SERPL-MCNC: 2.4 MG/DL (ref 1.6–2.6)
MCH RBC QN AUTO: 30.1 PG (ref 26.8–34.3)
MCHC RBC AUTO-ENTMCNC: 32.5 G/DL (ref 31.4–37.4)
MCV RBC AUTO: 93 FL (ref 82–98)
MONOCYTES # BLD AUTO: 1.19 THOUSAND/ÂΜL (ref 0.17–1.22)
MONOCYTES NFR BLD AUTO: 12 % (ref 4–12)
NEUTROPHILS # BLD AUTO: 7.93 THOUSANDS/ÂΜL (ref 1.85–7.62)
NEUTS SEG NFR BLD AUTO: 80 % (ref 43–75)
NITRITE UR QL STRIP: NEGATIVE
NRBC BLD AUTO-RTO: 0 /100 WBCS
PH UR STRIP.AUTO: 5.5 [PH]
PLATELET # BLD AUTO: 251 THOUSANDS/UL (ref 149–390)
PMV BLD AUTO: 11.2 FL (ref 8.9–12.7)
PROT UR STRIP-MCNC: NEGATIVE MG/DL
RBC # BLD AUTO: 5.44 MILLION/UL (ref 3.88–5.62)
SP GR UR STRIP.AUTO: 1.02 (ref 1–1.03)
T4 FREE SERPL-MCNC: 0.69 NG/DL (ref 0.61–1.12)
TREPONEMA PALLIDUM IGG+IGM AB [PRESENCE] IN SERUM OR PLASMA BY IMMUNOASSAY: NORMAL
TSH SERPL DL<=0.05 MIU/L-ACNC: 2.7 UIU/ML (ref 0.45–4.5)
UROBILINOGEN UR STRIP-ACNC: <2 MG/DL
VIT B12 SERPL-MCNC: 1406 PG/ML (ref 180–914)
WBC # BLD AUTO: 9.98 THOUSAND/UL (ref 4.31–10.16)

## 2023-08-21 PROCEDURE — 81003 URINALYSIS AUTO W/O SCOPE: CPT

## 2023-08-21 PROCEDURE — 86618 LYME DISEASE ANTIBODY: CPT

## 2023-08-21 PROCEDURE — 86780 TREPONEMA PALLIDUM: CPT

## 2023-08-21 PROCEDURE — 84590 ASSAY OF VITAMIN A: CPT

## 2023-08-21 PROCEDURE — 84207 ASSAY OF VITAMIN B-6: CPT

## 2023-08-21 PROCEDURE — 82607 VITAMIN B-12: CPT

## 2023-08-21 PROCEDURE — 87340 HEPATITIS B SURFACE AG IA: CPT

## 2023-08-21 PROCEDURE — 84446 ASSAY OF VITAMIN E: CPT

## 2023-08-21 PROCEDURE — 36415 COLL VENOUS BLD VENIPUNCTURE: CPT

## 2023-08-21 PROCEDURE — 86704 HEP B CORE ANTIBODY TOTAL: CPT

## 2023-08-21 PROCEDURE — 86645 CMV ANTIBODY IGM: CPT

## 2023-08-21 PROCEDURE — 86644 CMV ANTIBODY: CPT

## 2023-08-21 PROCEDURE — 86308 HETEROPHILE ANTIBODY SCREEN: CPT

## 2023-08-21 PROCEDURE — 83825 ASSAY OF MERCURY: CPT

## 2023-08-21 PROCEDURE — 82306 VITAMIN D 25 HYDROXY: CPT

## 2023-08-21 PROCEDURE — 86705 HEP B CORE ANTIBODY IGM: CPT

## 2023-08-21 PROCEDURE — 85025 COMPLETE CBC W/AUTO DIFF WBC: CPT

## 2023-08-21 PROCEDURE — 86140 C-REACTIVE PROTEIN: CPT

## 2023-08-21 PROCEDURE — 83735 ASSAY OF MAGNESIUM: CPT

## 2023-08-21 PROCEDURE — 83655 ASSAY OF LEAD: CPT

## 2023-08-21 PROCEDURE — 86803 HEPATITIS C AB TEST: CPT

## 2023-08-21 PROCEDURE — 84443 ASSAY THYROID STIM HORMONE: CPT

## 2023-08-21 PROCEDURE — 85652 RBC SED RATE AUTOMATED: CPT

## 2023-08-21 PROCEDURE — 82728 ASSAY OF FERRITIN: CPT

## 2023-08-21 PROCEDURE — 84425 ASSAY OF VITAMIN B-1: CPT

## 2023-08-21 PROCEDURE — 82175 ASSAY OF ARSENIC: CPT

## 2023-08-21 PROCEDURE — 84439 ASSAY OF FREE THYROXINE: CPT

## 2023-08-22 LAB
CMV IGG SERPL IA-ACNC: <0.6 U/ML (ref 0–0.59)
CMV IGM SERPL IA-ACNC: <30 AU/ML (ref 0–29.9)

## 2023-08-24 LAB
ARSENIC BLD-MCNC: 2 UG/L (ref 0–9)
LEAD BLD-MCNC: 22.8 UG/DL (ref 0–3.4)
MERCURY BLD-MCNC: 1.1 UG/L (ref 0–14.9)
VIT B6 SERPL-MCNC: 9.2 UG/L (ref 3.4–65.2)

## 2023-08-25 DIAGNOSIS — T56.0X4A TOXIC EFFECT OF LEAD, UNDETERMINED INTENT, INITIAL ENCOUNTER: Primary | ICD-10-CM

## 2023-08-26 LAB
A-TOCOPHEROL VIT E SERPL-MCNC: 6.1 MG/L (ref 9–29)
GAMMA TOCOPHEROL SERPL-MCNC: 1.1 MG/L (ref 0.5–4.9)
VIT A SERPL-MCNC: 53.1 UG/DL (ref 22–69.5)

## 2023-08-27 DIAGNOSIS — E56.0 VITAMIN E DEFICIENCY: Primary | ICD-10-CM

## 2023-08-28 LAB — MISCELLANEOUS LAB TEST RESULT: NORMAL

## 2023-09-07 ENCOUNTER — TELEPHONE (OUTPATIENT)
Dept: HEMATOLOGY ONCOLOGY | Facility: CLINIC | Age: 78
End: 2023-09-07

## 2023-09-07 ENCOUNTER — TELEPHONE (OUTPATIENT)
Dept: FAMILY MEDICINE CLINIC | Facility: CLINIC | Age: 78
End: 2023-09-07

## 2023-09-07 NOTE — TELEPHONE ENCOUNTER
Canceled apt with Hematology. Upon clinical review, diagnosis is not seen by Hematologists. Pt should be seen by someone who specializes with toxicology. Patient was notified of change and referred back to pcp.

## 2023-09-07 NOTE — TELEPHONE ENCOUNTER
Patient was told from Hematology, he should be referred to doctor who specialized in Toxicology due to the lead level. Appointment was cancelled, please advise who to refer to.

## 2023-09-08 DIAGNOSIS — T56.0X4A TOXIC EFFECT OF LEAD, UNDETERMINED INTENT, INITIAL ENCOUNTER: Primary | ICD-10-CM

## 2023-09-26 ENCOUNTER — OFFICE VISIT (OUTPATIENT)
Dept: FAMILY MEDICINE CLINIC | Facility: CLINIC | Age: 78
End: 2023-09-26
Payer: MEDICARE

## 2023-09-26 VITALS
WEIGHT: 163 LBS | RESPIRATION RATE: 14 BRPM | DIASTOLIC BLOOD PRESSURE: 82 MMHG | TEMPERATURE: 97.8 F | OXYGEN SATURATION: 99 % | HEIGHT: 66 IN | SYSTOLIC BLOOD PRESSURE: 124 MMHG | HEART RATE: 87 BPM | BODY MASS INDEX: 26.2 KG/M2

## 2023-09-26 DIAGNOSIS — Z00.01 ENCOUNTER FOR GENERAL ADULT MEDICAL EXAMINATION WITH ABNORMAL FINDINGS: Primary | ICD-10-CM

## 2023-09-26 DIAGNOSIS — M19.212 OTHER SECONDARY OSTEOARTHRITIS OF BOTH SHOULDERS: ICD-10-CM

## 2023-09-26 DIAGNOSIS — Z23 FLU VACCINE NEED: ICD-10-CM

## 2023-09-26 DIAGNOSIS — I73.9 PERIPHERAL VASCULAR DISEASE (HCC): ICD-10-CM

## 2023-09-26 DIAGNOSIS — M19.211 OTHER SECONDARY OSTEOARTHRITIS OF BOTH SHOULDERS: ICD-10-CM

## 2023-09-26 DIAGNOSIS — C91.10 CHRONIC LYMPHOCYTIC LEUKEMIA (HCC): ICD-10-CM

## 2023-09-26 DIAGNOSIS — I27.20 MILD PULMONARY HYPERTENSION (HCC): ICD-10-CM

## 2023-09-26 DIAGNOSIS — D32.9 MENINGIOMA (HCC): ICD-10-CM

## 2023-09-26 DIAGNOSIS — I48.19 PERSISTENT ATRIAL FIBRILLATION (HCC): ICD-10-CM

## 2023-09-26 DIAGNOSIS — Z23 NEED FOR VACCINATION AGAINST STREPTOCOCCUS PNEUMONIAE USING PNEUMOCOCCAL CONJUGATE VACCINE 13: ICD-10-CM

## 2023-09-26 PROCEDURE — 99214 OFFICE O/P EST MOD 30 MIN: CPT | Performed by: INTERNAL MEDICINE

## 2023-09-26 PROCEDURE — 90662 IIV NO PRSV INCREASED AG IM: CPT

## 2023-09-26 PROCEDURE — 90677 PCV20 VACCINE IM: CPT

## 2023-09-26 PROCEDURE — G0009 ADMIN PNEUMOCOCCAL VACCINE: HCPCS

## 2023-09-26 PROCEDURE — G0439 PPPS, SUBSEQ VISIT: HCPCS | Performed by: INTERNAL MEDICINE

## 2023-09-26 PROCEDURE — G0008 ADMIN INFLUENZA VIRUS VAC: HCPCS

## 2023-09-26 NOTE — PROGRESS NOTES
Name: Jaswinder Obrien      : 1945      MRN: [id number]  Encounter Provider: Atiya Sebastian MD  Encounter Date: 2023   Encounter department: [address] [place] [address]     1. Encounter for general adult medical examination with abnormal findings  Comments:  Done in Detail. .. Orders:  -     Digoxin level; Future  -     Vitamin E; Future  -     UA (URINE) with reflex to Scope; Future; Expected date: 10/03/2023  -     Comprehensive metabolic panel; Future  -     CBC and differential; Future  -     Magnesium; Future  -     Lipid panel; Future  -     Uric acid; Future    2. Persistent atrial fibrillation (HCC)  Comments:  stable,  continue same, FU w cardiology    3. Mild pulmonary hypertension (HCC)  Comments:  stable, continue same, RTC in 3 mos    4. Peripheral vascular disease ([address])  Comments:  stable continue same FU w vascular    5. Meningioma (HCC)  Comments:  stable, Repeat MRI in 1 year    6. Chronic lymphocytic leukemia (HCC)  Comments:  in remission, continue same, FU w Hematology    7. Other secondary osteoarthritis of both shoulders  -     Ambulatory Referral to Orthopedic Surgery; Future  -     Uric acid; Future    8. Flu vaccine need  -     FLUZONE HIGH-DOSE: influenza vaccine, high-dose, preservative-free 0.7 mL    9. Need for vaccination against Streptococcus pneumoniae using pneumococcal conjugate vaccine 13  -     Pneumococcal Conjugate Vaccine 20-valent (Pcv20)    moist Heat  Home P.T.  RTc in 3 mos w Blood work  AWV : done in detail       Subjective      69 Y O Man is here for AWV and Regular check Up, he feels OK Except for Bilt shoulder pain and LOM, recent Blood  Work and med list reviewed w  Pt,... Review of Systems   Constitutional: Negative for chills, fatigue and fever. HENT: Positive for postnasal drip. Negative for congestion, facial swelling, sore throat, trouble swallowing and voice change.     Eyes: Negative for pain, discharge and visual disturbance. Respiratory: Negative for cough, shortness of breath and wheezing. Cardiovascular: Negative for chest pain, palpitations and leg swelling. Gastrointestinal: Negative for abdominal pain, blood in stool, constipation, diarrhea and nausea. Endocrine: Negative for polydipsia, polyphagia and polyuria. Genitourinary: Negative for difficulty urinating, hematuria and urgency. Musculoskeletal: Positive for arthralgias and neck pain. Negative for myalgias. Skin: Negative for rash. Neurological: Negative for dizziness, tremors, weakness and headaches. Hematological: Negative for adenopathy. Does not bruise/bleed easily. Psychiatric/Behavioral: Negative for dysphoric mood, sleep disturbance and suicidal ideas. Current Outpatient Medications on File Prior to Visit   Medication Sig   • allopurinol (ZYLOPRIM) 100 mg tablet Take 2 tablets (200 mg total) by mouth daily   • apixaban (Eliquis) 5 mg Take 1 tablet (5 mg total) by mouth 2 (two) times a day   • b complex-C-E-zinc (STRESS FORMULA/ZINC) tablet Take 1 tablet by mouth daily   • Diclofenac Sodium  MG 24 hr tablet Take 1 tablet (100 mg total) by mouth daily With food/Lunch   • digoxin (LANOXIN) 0.125 mg tablet Take 1 tablet (125 mcg total) by mouth daily   • eszopiclone (LUNESTA) 2 mg tablet Take 1 tablet (2 mg total) by mouth daily at bedtime At 8 PM daily. ...   • Melatonin 10 MG TABS Take 1 tablet (10 mg total) by mouth daily at bedtime   • nebivolol (Bystolic) 10 mg tablet Take 1 tablet (10 mg total) by mouth daily   • rosuvastatin (CRESTOR) 5 mg tablet Take 1 tablet (5 mg total) by mouth daily   • tamsulosin (Flomax) 0.4 mg Take 1 capsule (0.4 mg total) by mouth daily with dinner   • valsartan (DIOVAN) 40 mg tablet Take 1 tablet (40 mg total) by mouth daily   • Vitamin E 90 MG (200 UNIT) capsule Take 200 Units by mouth daily   • [DISCONTINUED] predniSONE 5 mg tablet Take 1 tablet (5 mg total) by mouth daily With Breakfast daily   • [DISCONTINUED] vitamin E, tocopherol, 200 units capsule Take 1 capsule (200 Units total) by mouth daily   • Imbruvica 140 MG CAPS Take 1 capsule (140 mg total) by mouth 2 (two) times a day (Patient not taking: Reported on 9/26/2023)       Objective     /82 (BP Location: Left arm, Patient Position: Sitting, Cuff Size: Standard)   Pulse 87   Temp 97.8 °F (36.6 °C) (Tympanic)   Resp 14   Ht 5' 6" (1.676 m)   Wt 73.9 kg (163 lb)   SpO2 99%   BMI 26.31 kg/m²     Physical Exam  Constitutional:       General: He is not in acute distress. HENT:      Head: Normocephalic. Mouth/Throat:      Pharynx: No oropharyngeal exudate. Eyes:      General: No scleral icterus. Conjunctiva/sclera: Conjunctivae normal.      Pupils: Pupils are equal, round, and reactive to light. Neck:      Thyroid: No thyromegaly. Cardiovascular:      Rate and Rhythm: Normal rate and regular rhythm. Heart sounds: Normal heart sounds. No murmur heard. Pulmonary:      Effort: Pulmonary effort is normal. No respiratory distress. Breath sounds: Normal breath sounds. No wheezing or rales. Abdominal:      General: Bowel sounds are normal. There is no distension. Palpations: Abdomen is soft. Tenderness: There is no abdominal tenderness. There is no guarding or rebound. Musculoskeletal:         General: Tenderness present. Cervical back: Neck supple. Comments: LOM of both Shoulders. .   Lymphadenopathy:      Cervical: No cervical adenopathy. Skin:     Coloration: Skin is not pale. Findings: No rash. Neurological:      Mental Status: He is alert and oriented to person, place, and time. Sensory: No sensory deficit. Motor: No weakness.        Ke Salas MD

## 2023-09-26 NOTE — PATIENT INSTRUCTIONS
Medicare Preventive Visit Patient Instructions  Thank you for completing your Welcome to Medicare Visit or Medicare Annual Wellness Visit today. Your next wellness visit will be due in one year (9/26/2024). The screening/preventive services that you may require over the next 5-10 years are detailed below. Some tests may not apply to you based off risk factors and/or age. Screening tests ordered at today's visit but not completed yet may show as past due. Also, please note that scanned in results may not display below. Preventive Screenings:  Service Recommendations Previous Testing/Comments   Colorectal Cancer Screening  · Colonoscopy    · Fecal Occult Blood Test (FOBT)/Fecal Immunochemical Test (FIT)  · Fecal DNA/Cologuard Test  · Flexible Sigmoidoscopy Age: 43-73 years old   Colonoscopy: every 10 years (May be performed more frequently if at higher risk)  OR  FOBT/FIT: every 1 year  OR  Cologuard: every 3 years  OR  Sigmoidoscopy: every 5 years  Screening may be recommended earlier than age 39 if at higher risk for colorectal cancer. Also, an individualized decision between you and your healthcare provider will decide whether screening between the ages of 77-80 would be appropriate.  Colonoscopy: 11/07/2013  FOBT/FIT: Not on file  Cologuard: Not on file  Sigmoidoscopy: Not on file    Screening Current  Risks and Benefits Discussed     Prostate Cancer Screening Individualized decision between patient and health care provider in men between ages of 53-66   Medicare will cover every 12 months beginning on the day after your 50th birthday PSA: 1.8 ng/mL     Screening Not Indicated     Hepatitis C Screening Once for adults born between 1945 and 1965  More frequently in patients at high risk for Hepatitis C Hep C Antibody: 01/30/2020    Screening Current   Diabetes Screening 1-2 times per year if you're at risk for diabetes or have pre-diabetes Fasting glucose: 99 mg/dL (6/27/2023)  A1C: 5.9 (8/14/2023)  Screening Not Indicated  History Diabetes   Cholesterol Screening Once every 5 years if you don't have a lipid disorder. May order more often based on risk factors. Lipid panel: 06/27/2023  Screening Not Indicated  History Lipid Disorder      Other Preventive Screenings Covered by Medicare:  1. Abdominal Aortic Aneurysm (AAA) Screening: covered once if your at risk. You're considered to be at risk if you have a family history of AAA or a male between the age of 70-76 who smoking at least 100 cigarettes in your lifetime. 2. Lung Cancer Screening: covers low dose CT scan once per year if you meet all of the following conditions: (1) Age 48-67; (2) No signs or symptoms of lung cancer; (3) Current smoker or have quit smoking within the last 15 years; (4) You have a tobacco smoking history of at least 20 pack years (packs per day x number of years you smoked); (5) You get a written order from a healthcare provider. 3. Glaucoma Screening: covered annually if you're considered high risk: (1) You have diabetes OR (2) Family history of glaucoma OR (3)  aged 48 and older OR (3)  American aged 72 and older  3. Osteoporosis Screening: covered every 2 years if you meet one of the following conditions: (1) Have a vertebral abnormality; (2) On glucocorticoid therapy for more than 3 months; (3) Have primary hyperparathyroidism; (4) On osteoporosis medications and need to assess response to drug therapy. 5. HIV Screening: covered annually if you're between the age of 14-79. Also covered annually if you are younger than 13 and older than 72 with risk factors for HIV infection. For pregnant patients, it is covered up to 3 times per pregnancy.     Immunizations:  Immunization Recommendations   Influenza Vaccine Annual influenza vaccination during flu season is recommended for all persons aged >= 6 months who do not have contraindications   Pneumococcal Vaccine   * Pneumococcal conjugate vaccine = PCV13 (Prevnar 13), PCV15 (Vaxneuvance), PCV20 (Prevnar 20)  * Pneumococcal polysaccharide vaccine = PPSV23 (Pneumovax) Adults 2364 years old: 1-3 doses may be recommended based on certain risk factors  Adults 72 years old: 1-2 doses may be recommended based off what pneumonia vaccine you previously received   Hepatitis B Vaccine 3 dose series if at intermediate or high risk (ex: diabetes, end stage renal disease, liver disease)   Tetanus (Td) Vaccine - COST NOT COVERED BY MEDICARE PART B Following completion of primary series, a booster dose should be given every 10 years to maintain immunity against tetanus. Td may also be given as tetanus wound prophylaxis. Tdap Vaccine - COST NOT COVERED BY MEDICARE PART B Recommended at least once for all adults. For pregnant patients, recommended with each pregnancy. Shingles Vaccine (Shingrix) - COST NOT COVERED BY MEDICARE PART B  2 shot series recommended in those aged 48 and above     Health Maintenance Due:      Topic Date Due   • Colorectal Cancer Screening  11/07/2018   • Hepatitis C Screening  Completed     Immunizations Due:      Topic Date Due   • COVID-19 Vaccine (1) Never done   • Influenza Vaccine (1) 09/01/2023     Advance Directives   What are advance directives? Advance directives are legal documents that state your wishes and plans for medical care. These plans are made ahead of time in case you lose your ability to make decisions for yourself. Advance directives can apply to any medical decision, such as the treatments you want, and if you want to donate organs. What are the types of advance directives? There are many types of advance directives, and each state has rules about how to use them. You may choose a combination of any of the following:  · Living will: This is a written record of the treatment you want. You can also choose which treatments you do not want, which to limit, and which to stop at a certain time.  This includes surgery, medicine, IV fluid, and tube feedings. · Durable power of  for healthcare [facility]): This is a written record that states who you want to make healthcare choices for you when you are unable to make them for yourself. This person, called a proxy, is usually a family member or a friend. You may choose more than 1 proxy. · Do not resuscitate (DNR) order:  A DNR order is used in case your heart stops beating or you stop breathing. It is a request not to have certain forms of treatment, such as CPR. A DNR order may be included in other types of advance directives. · Medical directive: This covers the care that you want if you are in a coma, near death, or unable to make decisions for yourself. You can list the treatments you want for each condition. Treatment may include pain medicine, surgery, blood transfusions, dialysis, IV or tube feedings, and a ventilator (breathing machine). · Values history: This document has questions about your views, beliefs, and how you feel and think about life. This information can help others choose the care that you would choose. Why are advance directives important? An advance directive helps you control your care. Although spoken wishes may be used, it is better to have your wishes written down. Spoken wishes can be misunderstood, or not followed. Treatments may be given even if you do not want them. An advance directive may make it easier for your family to make difficult choices about your care. Weight Management   Why it is important to manage your weight:  Being overweight increases your risk of health conditions such as heart disease, high blood pressure, type 2 diabetes, and certain types of cancer. It can also increase your risk for osteoarthritis, sleep apnea, and other respiratory problems. Aim for a slow, steady weight loss. Even a small amount of weight loss can lower your risk of health problems.   How to lose weight safely:  A safe and healthy way to lose weight is to eat fewer calories and get regular exercise. You can lose up about 1 pound a week by decreasing the number of calories you eat by 500 calories each day. Healthy meal plan for weight management:  A healthy meal plan includes a variety of foods, contains fewer calories, and helps you stay healthy. A healthy meal plan includes the following:  · Eat whole-grain foods more often. A healthy meal plan should contain fiber. Fiber is the part of grains, fruits, and vegetables that is not broken down by your body. Whole-grain foods are healthy and provide extra fiber in your diet. Some examples of whole-grain foods are whole-wheat breads and pastas, oatmeal, brown rice, and bulgur. · Eat a variety of vegetables every day. Include dark, leafy greens such as spinach, kale, donny greens, and mustard greens. Eat yellow and orange vegetables such as carrots, sweet potatoes, and winter squash. · Eat a variety of fruits every day. Choose fresh or canned fruit (canned in its own juice or light syrup) instead of juice. Fruit juice has very little or no fiber. · Eat low-fat dairy foods. Drink fat-free (skim) milk or 1% milk. Eat fat-free yogurt and low-fat cottage cheese. Try low-fat cheeses such as mozzarella and other reduced-fat cheeses. · Choose meat and other protein foods that are low in fat. Choose beans or other legumes such as split peas or lentils. Choose fish, skinless poultry (chicken or turkey), or lean cuts of red meat (beef or pork). Before you cook meat or poultry, cut off any visible fat. · Use less fat and oil. Try baking foods instead of frying them. Add less fat, such as margarine, sour cream, regular salad dressing and mayonnaise to foods. Eat fewer high-fat foods. Some examples of high-fat foods include french fries, doughnuts, ice cream, and cakes. · Eat fewer sweets. Limit foods and drinks that are high in sugar. This includes candy, cookies, regular soda, and sweetened drinks.   Exercise:  Exercise at least 30 minutes per day on most days of the week. Some examples of exercise include walking, biking, dancing, and swimming. You can also fit in more physical activity by taking the stairs instead of the elevator or parking farther away from stores. Ask your healthcare provider about the best exercise plan for you. © Copyright [employer] 2018 Information is for End User's use only and may not be sold, redistributed or otherwise used for commercial purposes.  All illustrations and images included in CareNotes® are the copyrighted property of A.D.A.M., Inc. or  [employer]

## 2023-09-26 NOTE — PROGRESS NOTES
BMI Counseling: Body mass index is 26.31 kg/m². The BMI is above normal. Nutrition recommendations include reducing portion sizes.

## 2023-09-26 NOTE — PROGRESS NOTES
Assessment and Plan:     Problem List Items Addressed This Visit    None       Preventive health issues were discussed with patient, and age appropriate screening tests were ordered as noted in patient's After Visit Summary. Personalized health advice and appropriate referrals for health education or preventive services given if needed, as noted in patient's After Visit Summary.      History of Present Illness:     Patient presents for a Medicare Wellness Visit    HPI   Patient Care Team:  Mata Torres MD as PCP - General (Internal Medicine)     Review of Systems:     Review of Systems     Problem List:     Patient Active Problem List   Diagnosis   • Hypertension   • Hyperlipidemia   • Chronic lymphocytic leukemia (HCC)   • Meningioma (HCC)   • Osteoarthritis   • Persistent atrial fibrillation (HCC)   • Mild pulmonary hypertension (HCC)   • Benign non-nodular prostatic hyperplasia with lower urinary tract symptoms   • Chest pain   • Increased frequency of urination   • 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   • Moderate mitral valve regurgitation   • Lab test positive for detection of COVID-19 virus   • Peripheral vascular disease ([address])      Past Medical and Surgical History:     Past Medical History:   Diagnosis Date   • Hypertension    • Leukemia ([address])    • Osteoarthritis 9/26/2012     Past Surgical History:   Procedure Laterality Date   • CARDIOVERSION ([place] ONLY)  6/12/2020        • NO PAST SURGERIES     • [place] ECHO TRANSESOPHAG R-T 2D W/PRB IMG ACQUISJ I&R  6/12/2020           Family History:     Family History   Problem Relation Age of Onset   • Diabetes Mother    • Heart disease Father       Social History:     Social History     Socioeconomic History   • Marital status: /Civil Union     Spouse name: None   • Number of children: None   • Years of education: None   • Highest education level: None   Occupational History   • None   Tobacco Use   • Smoking status: Never   • Smokeless tobacco: Never   • Tobacco comments:     No passive smoke exposure   Vaping Use   • Vaping Use: Never used   Substance and Sexual Activity   • Alcohol use: Yes   • Drug use: No   • Sexual activity: Yes     Partners: Female   Other Topics Concern   • None   Social History Narrative   • None     Social Determinants of Health     Financial Resource Strain: Low Risk  (5/13/2021)    Overall Financial Resource Strain (CARDIA)    • Difficulty of Paying Living Expenses: Not hard at all   Food Insecurity: No Food Insecurity (5/13/2021)    Hunger Vital Sign    • Worried About Running Out of Food in the Last Year: Never true    • Ran Out of Food in the Last Year: Never true   Transportation Needs: No Transportation Needs (5/13/2021)    PRAPARE - Transportation    • Lack of Transportation (Medical): No    • Lack of Transportation (Non-Medical): No   Physical Activity: Inactive (5/13/2021)    Exercise Vital Sign    • Days of Exercise per Week: 0 days    • Minutes of Exercise per Session: 0 min   Stress: No Stress Concern Present (5/13/2021)    [facility]    • Feeling of Stress : Not at all   Social Connections:  Moderately Integrated (5/13/2021)    Social Connection and Isolation Panel [NHANES]    • Frequency of Communication with Friends and Family: More than three times a week    • Frequency of Social Gatherings with Friends and Family: More than three times a week    • Attends [employer] Services: 1 to 4 times per year    • Active Member of Clubs or Organizations: No    • Attends Club or Organization Meetings: Never    • Marital Status:    Intimate Partner Violence: Not At Risk (5/13/2021)    Humiliation, Afraid, Rape, and Kick questionnaire    • Fear of Current or Ex-Partner: No    • Emotionally Abused: No    • Physically Abused: No    • Sexually Abused: No   Housing Stability: Not on file      Medications and Allergies:     Current Outpatient Medications Medication Sig Dispense Refill   • allopurinol (ZYLOPRIM) 100 mg tablet Take 2 tablets (200 mg total) by mouth daily 180 tablet 3   • apixaban (Eliquis) 5 mg Take 1 tablet (5 mg total) by mouth 2 (two) times a day 180 tablet 3   • b complex-C-E-zinc (STRESS FORMULA/ZINC) tablet Take 1 tablet by mouth daily 90 tablet 3   • Diclofenac Sodium  MG 24 hr tablet Take 1 tablet (100 mg total) by mouth daily With food/Lunch 30 tablet 3   • digoxin (LANOXIN) 0.125 mg tablet Take 1 tablet (125 mcg total) by mouth daily 45 tablet 3   • eszopiclone (LUNESTA) 2 mg tablet Take 1 tablet (2 mg total) by mouth daily at bedtime At 8 PM daily. ...  30 tablet 1   • Melatonin 10 MG TABS Take 1 tablet (10 mg total) by mouth daily at bedtime 30 tablet 3   • nebivolol (Bystolic) 10 mg tablet Take 1 tablet (10 mg total) by mouth daily 90 tablet 3   • predniSONE 5 mg tablet Take 1 tablet (5 mg total) by mouth daily With Breakfast daily 30 tablet 0   • rosuvastatin (CRESTOR) 5 mg tablet Take 1 tablet (5 mg total) by mouth daily 90 tablet 3   • tamsulosin (Flomax) 0.4 mg Take 1 capsule (0.4 mg total) by mouth daily with dinner 90 capsule 3   • valsartan (DIOVAN) 40 mg tablet Take 1 tablet (40 mg total) by mouth daily 180 tablet 3   • Vitamin E 90 MG (200 UNIT) capsule Take 200 Units by mouth daily     • vitamin E, tocopherol, 200 units capsule Take 1 capsule (200 Units total) by mouth daily 30 capsule 0   • Imbruvica 140 MG CAPS Take 1 capsule (140 mg total) by mouth 2 (two) times a day (Patient not taking: Reported on 9/26/2023) 180 capsule 3     Current Facility-Administered Medications   Medication Dose Route Frequency Provider Last Rate Last Admin   • cyanocobalamin injection 1,000 mcg  1,000 mcg Intramuscular Q30 Days Rita Washburn MD   1,000 mcg at 08/14/23 1600     Allergies   Allergen Reactions   • Tetanus Antitoxin Rash     Patient told by mother medication was given as a child   • Tetanus Toxoid       Immunizations: Immunization History   Administered Date(s) Administered   • Fluzone Split Quad 0.5 mL 10/10/2017   • INFLUENZA 11/01/2013, 10/20/2015, 10/03/2016   • Influenza Quadrivalent Preservative Free 3 years and older IM 10/10/2017   • Influenza Split 10/16/2013   • Influenza Split High Dose Preservative Free IM 10/03/2016   • Influenza, high dose seasonal 0.7 mL 10/05/2018, 09/07/2021, 11/01/2022   • Influenza, seasonal, injectable 10/20/2015   • Pneumococcal Conjugate 13-Valent 09/07/2021   • Pneumococcal Polysaccharide PPV23 10/01/2002, 10/16/2013      Health Maintenance:         Topic Date Due   • Colorectal Cancer Screening  11/07/2018   • Hepatitis C Screening  Completed         Topic Date Due   • COVID-19 Vaccine (1) Never done   • Influenza Vaccine (1) 09/01/2023      Medicare Screening Tests and Risk Assessments:     Mary Hernandez is here for his Subsequent Wellness visit. Last Medicare Wellness visit information reviewed, patient interviewed, no change since last AWV. Health Risk Assessment:   Patient rates overall health as good. Patient feels that their physical health rating is same. Eyesight was rated as same. Hearing was rated as slightly worse. Patient feels that their emotional and mental health rating is same. Patients states they are never, rarely angry. Patient states they are never, rarely unusually tired/fatigued. Pain experienced in the last 7 days has been a lot. Patient's pain rating has been 1/10. Patient states that he has experienced no weight loss or gain in last 6 months. Depression Screening:   PHQ-2 Score: 0      Fall Risk Screening: In the past year, patient has experienced: no history of falling in past year      Home Safety:  Patient does not have trouble with stairs inside or outside of their home. Patient has working smoke alarms and has working carbon monoxide detector. Home safety hazards include: none. Nutrition:   Current diet is Regular.      Medications:   Patient is not currently taking any over-the-counter supplements. Patient is able to manage medications. Activities of Daily Living (ADLs)/Instrumental Activities of Daily Living (IADLs):   Walk and transfer into and out of bed and chair?: Yes  Dress and groom yourself?: Yes    Bathe or shower yourself?: Yes    Feed yourself? Yes  Do your laundry/housekeeping?: Yes  Manage your money, pay your bills and track your expenses?: Yes  Make your own meals?: Yes    Do your own shopping?: Yes    Previous Hospitalizations:   Any hospitalizations or ED visits within the last 12 months?: No      Advance Care Planning:   Living will: Yes    Durable POA for healthcare: Yes    Advanced directive: Yes    Advanced directive counseling given: Yes    Five wishes given: Yes    Patient declined ACP directive: Yes    End of Life Decisions reviewed with patient: Yes    Provider agrees with end of life decisions: Yes      PREVENTIVE SCREENINGS      Cardiovascular Screening:    General: Screening Not Indicated, History Lipid Disorder and Risks and Benefits Discussed      Diabetes Screening:     General: Screening Not Indicated, History Diabetes, Risks and Benefits Discussed and Screening Current      Colorectal Cancer Screening:     General: Screening Current and Risks and Benefits Discussed      Prostate Cancer Screening:    General: Screening Not Indicated and Risks and Benefits Discussed      Osteoporosis Screening:    General: Screening Not Indicated, History Osteoporosis and Risks and Benefits Discussed      Abdominal Aortic Aneurysm (AAA) Screening:    Risk factors include: age between 70-75 yo        General: Risks and Benefits Discussed      Lung Cancer Screening:     General: Screening Not Indicated and Risks and Benefits Discussed      Hepatitis C Screening:    General: Screening Current and Risks and Benefits Discussed    Screening, Brief Intervention, and Referral to Treatment (SBIRT)    Screening      Single Item Drug Screening:   How often have you used an illegal drug (including marijuana) or a prescription medication for non-medical reasons in the past year? never    Single Item Drug Screen Score: 0  Interpretation: Negative screen for possible drug use disorder    Other Counseling Topics:   Car/seat belt/driving safety, skin self-exam, sunscreen and calcium and vitamin D intake and regular weightbearing exercise. No results found.      Physical Exam:     Resp 14   Ht 5' 6" (1.676 m)   Wt 73.9 kg (163 lb)   BMI 26.31 kg/m²     Physical Exam     Hugh Jimenez MD

## 2023-10-06 DIAGNOSIS — J06.9 ACUTE URI: Primary | ICD-10-CM

## 2023-10-06 RX ORDER — GUAIFENESIN/DEXTROMETHORPHAN 100-10MG/5
5 SYRUP ORAL 3 TIMES DAILY PRN
Qty: 118 ML | Refills: 1 | Status: SHIPPED | OUTPATIENT
Start: 2023-10-06

## 2023-10-06 RX ORDER — AMOXICILLIN 500 MG/1
500 CAPSULE ORAL EVERY 8 HOURS SCHEDULED
Qty: 30 CAPSULE | Refills: 0 | Status: SHIPPED | OUTPATIENT
Start: 2023-10-06 | End: 2023-10-16

## 2023-10-10 ENCOUNTER — TELEPHONE (OUTPATIENT)
Dept: OBGYN CLINIC | Facility: HOSPITAL | Age: 78
End: 2023-10-10

## 2023-10-10 DIAGNOSIS — N40.0 ENLARGED PROSTATE: ICD-10-CM

## 2023-10-10 RX ORDER — TAMSULOSIN HYDROCHLORIDE 0.4 MG/1
0.4 CAPSULE ORAL
Qty: 90 CAPSULE | Refills: 3 | Status: SHIPPED | OUTPATIENT
Start: 2023-10-10

## 2023-10-10 NOTE — TELEPHONE ENCOUNTER
Called patient to clarify what paperwork he needed sent out. Advised patient Dr. Katy Carney does not require any new patient paperwork to be filled out.
Caller: Patient    Doctor: Faby Medrano    Reason for call: Patient asking if we can send the paperwork to him ahead of time for completion. Thank you!     Call back#: [phone]
2

## 2023-10-16 ENCOUNTER — TELEPHONE (OUTPATIENT)
Dept: OBGYN CLINIC | Facility: MEDICAL CENTER | Age: 78
End: 2023-10-16

## 2023-10-16 NOTE — TELEPHONE ENCOUNTER
Left message stating to please call the office. Unfortunately you appointment was scheduled incorrectly. Please call the office to reschedule. When Pt returns call please reschedule with Dr. Peter Koch.

## 2023-10-17 DIAGNOSIS — M19.90 ARTHRITIS: ICD-10-CM

## 2023-10-17 DIAGNOSIS — R53.83 OTHER FATIGUE: ICD-10-CM

## 2023-10-24 ENCOUNTER — APPOINTMENT (EMERGENCY)
Dept: RADIOLOGY | Facility: HOSPITAL | Age: 78
End: 2023-10-24
Payer: MEDICARE

## 2023-10-24 ENCOUNTER — HOSPITAL ENCOUNTER (EMERGENCY)
Facility: HOSPITAL | Age: 78
Discharge: HOME | End: 2023-10-24
Attending: SURGERY
Payer: MEDICARE

## 2023-10-24 VITALS
HEART RATE: 88 BPM | WEIGHT: 163.36 LBS | SYSTOLIC BLOOD PRESSURE: 141 MMHG | TEMPERATURE: 97.8 F | DIASTOLIC BLOOD PRESSURE: 81 MMHG | OXYGEN SATURATION: 97 % | RESPIRATION RATE: 18 BRPM

## 2023-10-24 DIAGNOSIS — S01.01XA LACERATION OF SCALP, INITIAL ENCOUNTER: ICD-10-CM
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
## 2023-10-24 PROBLEM — I48.91 ATRIAL FIBRILLATION (HCC): Status: ACTIVE | Noted: 2023-10-24

## 2023-10-24 LAB
ABO GROUP BLD: NORMAL
ABO GROUP BLD: NORMAL
BASE EXCESS BLDA CALC-SCNC: 1 MMOL/L (ref -2–3)
BLD GP AB SCN SERPL QL: NEGATIVE
CA-I BLD-SCNC: 1.11 MMOL/L (ref 1.12–1.32)
GLUCOSE SERPL-MCNC: 94 MG/DL (ref 65–140)
HCO3 BLDA-SCNC: 25.9 MMOL/L (ref 24–30)
HCT VFR BLD CALC: 41 % (ref 36.5–49.3)
HGB BLDA-MCNC: 13.9 G/DL (ref 12–17)
PCO2 BLD: 27 MMOL/L (ref 21–32)
PCO2 BLD: 40 MM HG (ref 42–50)
PH BLD: 7.42 [PH] (ref 7.3–7.4)
PO2 BLD: 34 MM HG (ref 35–45)
POTASSIUM BLD-SCNC: 4.2 MMOL/L (ref 3.5–5.3)
RH BLD: POSITIVE
RH BLD: POSITIVE
SAO2 % BLD FROM PO2: 67 % (ref 60–85)
SODIUM BLD-SCNC: 140 MMOL/L (ref 136–145)
SPECIMEN EXPIRATION DATE: NORMAL
SPECIMEN SOURCE: ABNORMAL

## 2023-10-24 PROCEDURE — 85014 HEMATOCRIT: CPT

## 2023-10-24 PROCEDURE — 72125 CT NECK SPINE W/O DYE: CPT

## 2023-10-24 PROCEDURE — 86901 BLOOD TYPING SEROLOGIC RH(D): CPT | Performed by: SURGERY

## 2023-10-24 PROCEDURE — 82947 ASSAY GLUCOSE BLOOD QUANT: CPT

## 2023-10-24 PROCEDURE — 70450 CT HEAD/BRAIN W/O DYE: CPT

## 2023-10-24 PROCEDURE — 84295 ASSAY OF SERUM SODIUM: CPT

## 2023-10-24 PROCEDURE — NC001 PR NO CHARGE: Performed by: SURGERY

## 2023-10-24 PROCEDURE — 12002 RPR S/N/AX/GEN/TRNK2.6-7.5CM: CPT | Performed by: SURGERY

## 2023-10-24 PROCEDURE — 99284 EMERGENCY DEPT VISIT MOD MDM: CPT | Performed by: SURGERY

## 2023-10-24 PROCEDURE — EDAIR PR ED AIR: Performed by: EMERGENCY MEDICINE

## 2023-10-24 PROCEDURE — 82803 BLOOD GASES ANY COMBINATION: CPT

## 2023-10-24 PROCEDURE — 86850 RBC ANTIBODY SCREEN: CPT | Performed by: SURGERY

## 2023-10-24 PROCEDURE — 82330 ASSAY OF CALCIUM: CPT

## 2023-10-24 PROCEDURE — 36415 COLL VENOUS BLD VENIPUNCTURE: CPT | Performed by: SURGERY

## 2023-10-24 PROCEDURE — 71045 X-RAY EXAM CHEST 1 VIEW: CPT

## 2023-10-24 PROCEDURE — 84132 ASSAY OF SERUM POTASSIUM: CPT

## 2023-10-24 PROCEDURE — 99284 EMERGENCY DEPT VISIT MOD MDM: CPT

## 2023-10-24 PROCEDURE — 86900 BLOOD TYPING SEROLOGIC ABO: CPT | Performed by: SURGERY

## 2023-10-24 RX ORDER — ACETAMINOPHEN 325 MG/1
650 TABLET ORAL EVERY 6 HOURS PRN
Refills: 0 | COMMUNITY
Start: 2023-10-24

## 2023-10-24 RX ORDER — ACETAMINOPHEN 325 MG/1
650 TABLET ORAL EVERY 6 HOURS PRN
Status: DISCONTINUED | OUTPATIENT
Start: 2023-10-24 | End: 2023-10-24 | Stop reason: HOSPADM

## 2023-10-24 RX ORDER — BACITRACIN, NEOMYCIN, POLYMYXIN B 400; 3.5; 5 [USP'U]/G; MG/G; [USP'U]/G
1 OINTMENT TOPICAL 2 TIMES DAILY
Status: DISCONTINUED | OUTPATIENT
Start: 2023-10-24 | End: 2023-10-24 | Stop reason: HOSPADM

## 2023-10-24 RX ADMIN — ACETAMINOPHEN 650 MG: 325 TABLET, FILM COATED ORAL at 17:33

## 2023-10-24 RX ADMIN — BACITRACIN ZINC, NEOMYCIN, POLYMYXIN B 1 SMALL APPLICATION: 400; 3.5; 5 OINTMENT TOPICAL at 17:34

## 2023-10-24 NOTE — ED PROVIDER NOTES
Emergency Department Airway Evaluation and Management Form    History  Obtained from: EMS  Patient has no allergy information on record. No chief complaint on file. HPI  81-year-old male, on blood thinner for history of atrial fibrillation, presenting to the emergency department for evaluation of fall with head strike. Patient was attempting to secure a ladder on his pickup truck, lost his balance and fell backwards from a standing position striking the back of his head against concrete. Negative LOC. Patient reportedly has 2 lacerations on the posterior occiput where bleeding is controlled. No past medical history on file. No past surgical history on file. No family history on file. I have reviewed and agree with the history as documented. Review of Systems    Physical Exam  /91   Pulse (!) 116   Temp 97.8 °F (36.6 °C)   Resp 18   SpO2 94%     Physical Exam  Airway intact. Trachea midline. Breath sounds bilaterally. Chest nontender. Pulses intact. Vitals reviewed. GCS 15. Moves all 4 extremities. Patient is in atrial fibrillation with rapid ventricular response on the monitor. ED Medications  Medications - No data to display    Intubation  Procedures    Notes  No acute airway issues. Final Diagnosis  Final diagnoses:   None   Head injury. Scalp laceration.     ED Provider  Electronically Signed by     Darshana Patten MD  10/24/23 [id number]

## 2023-10-24 NOTE — ASSESSMENT & PLAN NOTE
- Status post mechanical fall with the below noted injuries. - Fall precautions.  - PT and OT evaluation and treatment as indicated.

## 2023-10-24 NOTE — CASE MANAGEMENT
Case Management Progress Note    Patient name Deetta Cooks  Location TR 02/TR 02 MRN [id number]  : 1945 Date 10/24/2023       LOS (days): 0  Geometric Mean LOS (GMLOS) (days):   Days to GMLOS:        OBJECTIVE:        Current admission status: Emergency  Preferred Pharmacy:   PATIENT/FAMILY REPORTS NO PREFERRED PHARMACY  No address on file      Primary Care Provider: No primary care provider on file. Primary Insurance: MEDICARE  Secondary Insurance:     PROGRESS NOTE:      CM responded to trauma alert. Pt was brought to the ED via [facility] s/p fall near his  truck. Pt c/o + head strike, -LOC, and had lac to the back of his head.

## 2023-10-24 NOTE — DISCHARGE INSTR - AVS FIRST PAGE
Trauma Surgery Discharge Instructions    Please follow-up as instructed. If you do not already have a follow-up appointment, please call the office when you leave to schedule an appointment to be seen in 2-3 weeks for post-operative re-evaluation. Activity:  - Walking and normal light activities are encouraged. - Normal daily activities including climbing steps are okay. - No driving until no longer using pain medications. Return to work:    - You may return to work in 2 weeks or sooner if you are feeling well enough. Diet:    - You may resume your normal diet. Wound Care:  - May shower daily. No tub baths or swimming until cleared by your surgeon.  - Wash incision gently with soap and water and pat dry. -You may apply Neosporin to the wound 1-2 times daily  - You may apply ice as needed (no longer than 20 minutes at a time) for the first 48 hours. - Bruising is not unusual and will go away with a little time. You may apply a warm, moist compress that may help the bruising resolve quicker.  -You may change head dressing daily, or if soiled as needed. Any new dressings are optional if the wound is dry. Medications:    - You may resume all of your regular medications after discharge unless otherwise instructed. Please refer to your discharge medication list for further details. - Please take the pain medications as directed. - You are encouraged to use non-narcotic pain medications first and whenever possible. Reserve the use of narcotic pain medication for moderate to severe pain not controlled by non-narcotic medications.  - No driving while taking narcotic pain medications. - You may become constipated, especially if taking pain medications. You may take any over the counter stool softeners or laxatives as needed. Examples: Milk of Magnesia, Colace, Senna.     Additional Instructions:  - If you have any questions or concerns after discharge please call the office.  - Call office or return to ER if fever greater than 101, chills, persistent nausea/vomiting, uncontrolled bleeding, feelings of lightheadedness, worsening/uncontrollable pain, and/or increasing redness or purulent/foul smelling drainage from incision(s).

## 2023-10-24 NOTE — PROCEDURES
Left posterior scalp lack was irrigated with mixture of 500 cc normal saline and Betadine was patted dry, and several staples were applied to the wound with approximation of skin edges. A 4 x 4 was used for hemostasis. Neosporin along with a 4 x 4 and Ephraim dressing were applied to the wound.

## 2023-10-24 NOTE — H&P
[facility] ([place]  H&P  Name: Korina Palomo 69 y.o. male I MRN: [id number]  Unit/Bed#: ED 32 I Date of Admission: 10/24/2023   Date of Service: 10/24/2023 I Hospital Day: 0      Assessment/Plan   Atrial fibrillation [facility])  Assessment & Plan  - On eliquis    Scalp laceration  Assessment & Plan  - secondary to fall with head strike on pavement    Fall  Assessment & Plan  - Status post mechanical fall with the below noted injuries. - Fall precautions.  - PT and OT evaluation and treatment as indicated. Trauma Alert: Level B   Model of Arrival: Ambulance    Trauma Team: Attending Yoana Hill and Residents Karolina Wooten  Consultants:     None     History of Present Illness     Chief Complaint: Mechanical fall  Mechanism:Fall     HPI:    Korina Palomo is a 69 y.o. male who presents after fall from standing while trying to move a ladder. He noticed that the ladder was not tied down and he was trying to move it when it fell backward on to him causing him to fall and strike the back of his head on the ground. He did not lose consciousness. He complains of posterior head pain. Review of Systems   Constitutional:  Negative for activity change, appetite change, diaphoresis, fatigue and fever. HENT:  Negative for congestion, ear discharge, ear pain, facial swelling, hearing loss, mouth sores, nosebleeds, postnasal drip, rhinorrhea, sinus pressure, sinus pain, sneezing and sore throat. Eyes:  Negative for photophobia, pain and redness. Respiratory:  Negative for cough, chest tightness, shortness of breath and wheezing. Cardiovascular:  Negative for chest pain and palpitations. Gastrointestinal:  Negative for abdominal distention, abdominal pain, blood in stool, constipation, diarrhea, nausea and vomiting. Genitourinary:  Negative for dysuria, frequency, hematuria and urgency. Musculoskeletal:  Negative for back pain and neck pain. Skin:  Negative for wound.    Neurological: Negative for dizziness, seizures, syncope, weakness, numbness and headaches. 12-point, complete review of systems was reviewed and negative except as stated above. Historical Information     Past Medical History:   Diagnosis Date    Atrial fibrillation ([address])     CLL (chronic lymphocytic leukemia) ([address])     HLD (hyperlipidemia)     HTN (hypertension)     Hypertension     Leukemia (HCC)     Meningioma (HCC)     PAD (peripheral artery disease) ([address])      History reviewed. No pertinent surgical history. Social History     Tobacco Use    Smoking status: Never    Smokeless tobacco: Never       There is no immunization history on file for this patient. Last Tetanus: Update today  Family History: Non-contributory    1. Before the illness or injury that brought you to the Emergency, did you need someone to help you on a regular basis? 0=No   2. Since the illness or injury that brought you to the Emergency, have you needed more help than usual to take care of yourself? 0=No   3. Have you been hospitalized for one or more nights during the past 6 months (excluding a stay in the Emergency Department)? 0=No   4. In general, do you see well? 0=Yes   5. In general, do you have serious problems with your memory? 0=No   6. Do you take more than three different medications everyday? 1=Yes   TOTAL   1     Did you order a geriatric consult if the score was 2 or greater?: n/a     Meds/Allergies   all current active meds have been reviewed  Allergies have not been reviewed;     Allergies   Allergen Reactions    Tetanus Toxoids Other (See Comments)     Not specified    Tetanus Antitoxin Rash       Objective   Initial Vitals:   Temperature: 97.8 °F (36.6 °C) (10/24/23 1557)  Pulse: (!) 116 (10/24/23 1557)  Respirations: 18 (10/24/23 1557)  Blood Pressure: 161/91 (10/24/23 1557)    Primary Survey:   Airway:        Status: patent;        Pre-hospital Interventions: none        Hospital Interventions: none  Breathing: Pre-hospital Interventions: none       Effort: normal       Right breath sounds: normal       Left breath sounds: normal  Circulation:        Rhythm: regular       Rate: regular   Right Pulses Left Pulses    R radial: 2+  R femoral: 2+  R pedal: 2+     L radial: 2+  L femoral: 2+  L pedal: 2+       Disability:        GCS: Eye: 4; Verbal: 5 Motor: 6 Total: 15       Right Pupil: round;  reactive         Left Pupil:  round;  reactive      R Motor Strength L Motor Strength    R : 5/5  R dorsiflex: 5/5  R plantarflex: 5/5 L : 5/5  L dorsiflex: 5/5  L plantarflex: 5/5        Sensory:  No sensory deficit  Exposure:       Completed: Yes      Secondary Survey:  Physical Exam  Vitals reviewed. Constitutional:       Appearance: Normal appearance. HENT:      Head:      Comments: Posterior scalp laceration      Mouth/Throat:      Mouth: Mucous membranes are moist.   Eyes:      General:         Right eye: No discharge. Left eye: No discharge. Extraocular Movements: Extraocular movements intact. Cardiovascular:      Rate and Rhythm: Normal rate. Rhythm irregular. Pulses: Normal pulses. Pulmonary:      Effort: Pulmonary effort is normal. No respiratory distress. Breath sounds: Normal breath sounds. No wheezing. Abdominal:      General: Abdomen is flat. There is no distension. Palpations: Abdomen is soft. Tenderness: There is no abdominal tenderness. There is no guarding. Musculoskeletal:      Right lower leg: No edema. Left lower leg: No edema. Skin:     General: Skin is warm and dry. Neurological:      Mental Status: He is alert and oriented to person, place, and time. Invasive Devices       Peripheral Intravenous Line  Duration             Peripheral IV 10/24/23 Left Antecubital <1 day                  Lab Results: I have personally reviewed all pertinent laboratory/test results from 10/24/23, including the preceding 24 hours.   Recent Labs     10/24/23  1709 HGB 13.9   HCT 41   CO2 27   CAIONIZED 1.11*       Imaging Results: I have personally reviewed pertinent images saved in PACS. CT scan findings (and other pertinent positive findings on images) were discussed with radiology.  My interpretation of the images/reports are as follows:  Chest Xray(s): negative for acute findings   FAST exam(s): negative for acute findings   CT Scan(s): pending   Additional Xray(s): N/A     Other Studies: none    Code Status: No Order       ---  Bisi Martinez MD  General Surgery PGY-I

## 2023-10-24 NOTE — PROCEDURES
POC FAST US    Date/Time: 10/24/2023 4:02 PM    Performed by: Christie Khan MD  Authorized by: Christie Khan MD    Patient location:  Trauma  Procedure details:     Exam Type:  Diagnostic    Indications: blunt abdominal trauma and blunt chest trauma      Assess for:  Intra-abdominal fluid and pericardial effusion    Technique: FAST      Views obtained:  Heart - Pericardial sac, LUQ - Splenorenal space, Suprapubic - Pouch of Arcenio and RUQ - Comer's Pouch    Image quality: diagnostic      Image availability:  Images available in PACS and video obtained  FAST Findings:     RUQ (Hepatorenal) free fluid: absent      LUQ (Splenorenal) free fluid: absent      Suprapubic free fluid: absent      Cardiac wall motion: identified      Pericardial effusion: absent    Interpretation:     Impressions: negative

## 2023-10-26 ENCOUNTER — OFFICE VISIT (OUTPATIENT)
Dept: FAMILY MEDICINE CLINIC | Facility: CLINIC | Age: 78
End: 2023-10-26
Payer: MEDICARE

## 2023-10-26 VITALS
TEMPERATURE: 97.9 F | OXYGEN SATURATION: 99 % | SYSTOLIC BLOOD PRESSURE: 122 MMHG | BODY MASS INDEX: 26.03 KG/M2 | RESPIRATION RATE: 15 BRPM | HEART RATE: 103 BPM | WEIGHT: 162 LBS | DIASTOLIC BLOOD PRESSURE: 82 MMHG | HEIGHT: 66 IN

## 2023-10-26 DIAGNOSIS — S09.90XD INJURY OF HEAD, SUBSEQUENT ENCOUNTER: ICD-10-CM

## 2023-10-26 DIAGNOSIS — I48.19 PERSISTENT ATRIAL FIBRILLATION (HCC): Primary | ICD-10-CM

## 2023-10-26 DIAGNOSIS — I73.9 PERIPHERAL VASCULAR DISEASE (HCC): ICD-10-CM

## 2023-10-26 DIAGNOSIS — Z51.89 ENCOUNTER FOR WOUND CARE: ICD-10-CM

## 2023-10-26 DIAGNOSIS — D32.9 MENINGIOMA (HCC): ICD-10-CM

## 2023-10-26 DIAGNOSIS — I27.20 MILD PULMONARY HYPERTENSION (HCC): ICD-10-CM

## 2023-10-26 PROCEDURE — 99214 OFFICE O/P EST MOD 30 MIN: CPT | Performed by: INTERNAL MEDICINE

## 2023-10-26 RX ORDER — AMOXICILLIN AND CLAVULANATE POTASSIUM 875; 125 MG/1; MG/1
1 TABLET, FILM COATED ORAL EVERY 12 HOURS SCHEDULED
Qty: 14 TABLET | Refills: 0 | Status: SHIPPED | OUTPATIENT
Start: 2023-10-26 | End: 2023-11-02

## 2023-10-26 NOTE — PROGRESS NOTES
Name: Darin Harris      : 1945      MRN: [id number]  Encounter Provider: Aislinn Mitchell MD  Encounter Date: 10/26/2023   Encounter department: [address] [place] [address]     1. Persistent atrial fibrillation ([address])  Comments:  stable  continue same  FU w cardiology    2. Mild pulmonary hypertension ([address])  Comments:  as above. ..    3. Peripheral vascular disease ([address])  Comments:  stable  continue same  FU w Vascular    4. BMI 26.0-26.9,adult    5. Meningioma ([address])  Comments:  stable  continue same  FU w Neurosurgery    6. Injury of head, subsequent encounter  Comments:  keep area dry and clean  stay off work  RTC in 3-4 days  Orders:  -     mupirocin (BACTROBAN) 2 % ointment; Apply topically 3 (three) times a day  -     amoxicillin-clavulanate (AUGMENTIN) 875-125 mg per tablet; Take 1 tablet by mouth every 12 (twelve) hours for 7 days With food/Meals    7. Encounter for wound care  Comments:  done in Office . ... RTC in 1-2 weeks for removing staples. .. Orders:  -     mupirocin (BACTROBAN) 2 % ointment; Apply topically 3 (three) times a day  -     amoxicillin-clavulanate (AUGMENTIN) 875-125 mg per tablet; Take 1 tablet by mouth every 12 (twelve) hours for 7 days With food/Meals    RTC in 1 week       Subjective      69 Y O Man is here for Post E R Visit , to re check on head Injury and wound care, on top of head,... Review of Systems   Constitutional:  Negative for chills, fatigue and fever. HENT:  Negative for congestion, facial swelling, sore throat, trouble swallowing and voice change. Eyes:  Negative for pain, discharge and visual disturbance. Respiratory:  Negative for cough, shortness of breath and wheezing. Cardiovascular:  Negative for chest pain, palpitations and leg swelling. Gastrointestinal:  Negative for abdominal pain, blood in stool, constipation, diarrhea and nausea. Endocrine: Negative for polydipsia, polyphagia and polyuria. Genitourinary:  Negative for difficulty urinating, hematuria and urgency. Musculoskeletal:  Negative for arthralgias and myalgias. Skin:  Positive for wound. Negative for rash. Neurological:  Negative for dizziness, tremors, weakness and headaches. Hematological:  Negative for adenopathy. Does not bruise/bleed easily. Psychiatric/Behavioral:  Negative for dysphoric mood, sleep disturbance and suicidal ideas. Current Outpatient Medications on File Prior to Visit   Medication Sig    acetaminophen (TYLENOL) 325 mg tablet Take 2 tablets (650 mg total) by mouth every 6 (six) hours as needed for mild pain    allopurinol (ZYLOPRIM) 100 mg tablet Take 2 tablets (200 mg total) by mouth daily    apixaban (Eliquis) 5 mg Take 1 tablet (5 mg total) by mouth 2 (two) times a day    b complex-C-E-zinc (STRESS FORMULA/ZINC) tablet Take 1 tablet by mouth daily    dextromethorphan-guaiFENesin (ROBITUSSIN DM)  mg/5 mL syrup Take 5 mL by mouth 3 (three) times a day as needed for cough or congestion    Diclofenac Sodium  MG 24 hr tablet Take 1 tablet (100 mg total) by mouth daily With food/Lunch    digoxin (LANOXIN) 0.125 mg tablet Take 1 tablet (125 mcg total) by mouth daily    eszopiclone (LUNESTA) 2 mg tablet Take 1 tablet (2 mg total) by mouth daily at bedtime At 8 PM daily. ...     Melatonin 10 MG TABS Take 1 tablet (10 mg total) by mouth daily at bedtime    nebivolol (Bystolic) 10 mg tablet Take 1 tablet (10 mg total) by mouth daily    rosuvastatin (CRESTOR) 5 mg tablet Take 1 tablet (5 mg total) by mouth daily    tamsulosin (Flomax) 0.4 mg Take 1 capsule (0.4 mg total) by mouth daily with dinner    valsartan (DIOVAN) 40 mg tablet Take 1 tablet (40 mg total) by mouth daily    Vitamin E 90 MG (200 UNIT) capsule Take 200 Units by mouth daily    Imbruvica 140 MG CAPS Take 1 capsule (140 mg total) by mouth 2 (two) times a day (Patient not taking: Reported on 9/26/2023)       Objective     /82 (BP Location: Left arm, Patient Position: Sitting, Cuff Size: Standard)   Pulse 103   Temp 97.9 °F (36.6 °C) (Tympanic)   Resp 15   Ht 5' 6" (1.676 m)   Wt 73.5 kg (162 lb)   SpO2 99%   BMI 26.15 kg/m²     Physical Exam  Constitutional:       General: He is not in acute distress. HENT:      Head: Normocephalic. Mouth/Throat:      Pharynx: No oropharyngeal exudate. Eyes:      General: No scleral icterus. Conjunctiva/sclera: Conjunctivae normal.      Pupils: Pupils are equal, round, and reactive to light. Neck:      Thyroid: No thyromegaly. Cardiovascular:      Rate and Rhythm: Normal rate and regular rhythm. Heart sounds: Normal heart sounds. No murmur heard. Pulmonary:      Effort: Pulmonary effort is normal. No respiratory distress. Breath sounds: Normal breath sounds. No wheezing or rales. Abdominal:      General: Bowel sounds are normal. There is no distension. Palpations: Abdomen is soft. Tenderness: There is no abdominal tenderness. There is no guarding or rebound. Musculoskeletal:         General: No tenderness. Cervical back: Neck supple. Lymphadenopathy:      Cervical: No cervical adenopathy. Skin:     Coloration: Skin is not pale. Findings: Erythema present. No rash. Comments: Wound on top head, with bleeding center, and staples on edges,. No sings of Infections,... Neurological:      Mental Status: He is alert and oriented to person, place, and time. Sensory: No sensory deficit. Motor: No weakness.        Kay Francis MD

## 2023-10-26 NOTE — PROGRESS NOTES
BMI Counseling: Body mass index is 26.15 kg/m². The BMI is above normal. Nutrition recommendations include reducing portion sizes.

## 2023-10-27 ENCOUNTER — CLINICAL SUPPORT (OUTPATIENT)
Dept: FAMILY MEDICINE CLINIC | Facility: CLINIC | Age: 78
End: 2023-10-27
Payer: MEDICARE

## 2023-10-27 DIAGNOSIS — Z51.89 ENCOUNTER FOR WOUND CARE: Primary | ICD-10-CM

## 2023-10-27 PROCEDURE — 99211 OFF/OP EST MAY X REQ PHY/QHP: CPT

## 2023-10-31 ENCOUNTER — OFFICE VISIT (OUTPATIENT)
Dept: FAMILY MEDICINE CLINIC | Facility: CLINIC | Age: 78
End: 2023-10-31
Payer: MEDICARE

## 2023-10-31 VITALS
TEMPERATURE: 98 F | WEIGHT: 160 LBS | HEART RATE: 86 BPM | OXYGEN SATURATION: 98 % | HEIGHT: 66 IN | RESPIRATION RATE: 15 BRPM | DIASTOLIC BLOOD PRESSURE: 84 MMHG | SYSTOLIC BLOOD PRESSURE: 126 MMHG | BODY MASS INDEX: 25.71 KG/M2

## 2023-10-31 DIAGNOSIS — B37.42 CANDIDIASIS OF PENIS: Primary | ICD-10-CM

## 2023-10-31 DIAGNOSIS — Z51.89 ENCOUNTER FOR WOUND CARE: ICD-10-CM

## 2023-10-31 PROCEDURE — 99214 OFFICE O/P EST MOD 30 MIN: CPT | Performed by: INTERNAL MEDICINE

## 2023-10-31 RX ORDER — CLOTRIMAZOLE AND BETAMETHASONE DIPROPIONATE 10; .64 MG/G; MG/G
CREAM TOPICAL 2 TIMES DAILY
Qty: 30 G | Refills: 1 | Status: SHIPPED | OUTPATIENT
Start: 2023-10-31

## 2023-10-31 RX ORDER — FLUCONAZOLE 150 MG/1
150 TABLET ORAL DAILY
Qty: 3 TABLET | Refills: 0 | Status: SHIPPED | OUTPATIENT
Start: 2023-10-31 | End: 2023-11-03

## 2023-10-31 NOTE — PROGRESS NOTES
Name: Kenia Vaughan      : 1945      MRN: [id number]  Encounter Provider: Essence Peterson MD  Encounter Date: 10/31/2023   Encounter department: [address] [place] [address]     1. Candidiasis of penis  -     clotrimazole-betamethasone (LOTRISONE) 1-0.05 % cream; Apply topically 2 (two) times a day  -     fluconazole (DIFLUCAN) 150 mg tablet; Take 1 tablet (150 mg total) by mouth daily for 3 doses    2. Encounter for wound care  Comments:  keep area dry and clean  re dress the wound on top of head  RTC in 5-7 days for staples removal        Depression Screening and Follow-up Plan: Patient was screened for depression during today's encounter. They screened negative with a PHQ-2 score of 0. Subjective      69 Y O Man is here for Regular check up, he has itchy rash in private area, also the wound on top of head is improving slowly,... Review of Systems   Constitutional:  Negative for chills, fatigue and fever. HENT:  Negative for congestion, facial swelling, sore throat, trouble swallowing and voice change. Eyes:  Negative for pain, discharge and visual disturbance. Respiratory:  Negative for cough, shortness of breath and wheezing. Cardiovascular:  Negative for chest pain, palpitations and leg swelling. Gastrointestinal:  Negative for abdominal pain, blood in stool, constipation, diarrhea and nausea. Endocrine: Negative for polydipsia, polyphagia and polyuria. Genitourinary:  Negative for difficulty urinating, hematuria and urgency. Musculoskeletal:  Negative for arthralgias and myalgias. Skin:  Positive for color change, rash and wound. Neurological:  Negative for dizziness, tremors, weakness and headaches. Hematological:  Negative for adenopathy. Does not bruise/bleed easily. Psychiatric/Behavioral:  Negative for dysphoric mood, sleep disturbance and suicidal ideas.         Current Outpatient Medications on File Prior to Visit Medication Sig    acetaminophen (TYLENOL) 325 mg tablet Take 2 tablets (650 mg total) by mouth every 6 (six) hours as needed for mild pain    allopurinol (ZYLOPRIM) 100 mg tablet Take 2 tablets (200 mg total) by mouth daily    amoxicillin-clavulanate (AUGMENTIN) 875-125 mg per tablet Take 1 tablet by mouth every 12 (twelve) hours for 7 days With food/Meals    apixaban (Eliquis) 5 mg Take 1 tablet (5 mg total) by mouth 2 (two) times a day    b complex-C-E-zinc (STRESS FORMULA/ZINC) tablet Take 1 tablet by mouth daily    dextromethorphan-guaiFENesin (ROBITUSSIN DM)  mg/5 mL syrup Take 5 mL by mouth 3 (three) times a day as needed for cough or congestion    Diclofenac Sodium  MG 24 hr tablet Take 1 tablet (100 mg total) by mouth daily With food/Lunch    digoxin (LANOXIN) 0.125 mg tablet Take 1 tablet (125 mcg total) by mouth daily    eszopiclone (LUNESTA) 2 mg tablet Take 1 tablet (2 mg total) by mouth daily at bedtime At 8 PM daily. ...     Melatonin 10 MG TABS Take 1 tablet (10 mg total) by mouth daily at bedtime    mupirocin (BACTROBAN) 2 % ointment Apply topically 3 (three) times a day    nebivolol (Bystolic) 10 mg tablet Take 1 tablet (10 mg total) by mouth daily    rosuvastatin (CRESTOR) 5 mg tablet Take 1 tablet (5 mg total) by mouth daily    tamsulosin (Flomax) 0.4 mg Take 1 capsule (0.4 mg total) by mouth daily with dinner    valsartan (DIOVAN) 40 mg tablet Take 1 tablet (40 mg total) by mouth daily    Vitamin E 90 MG (200 UNIT) capsule Take 200 Units by mouth daily    Imbruvica 140 MG CAPS Take 1 capsule (140 mg total) by mouth 2 (two) times a day (Patient not taking: Reported on 9/26/2023)       Objective     /84 (BP Location: Left arm, Patient Position: Sitting, Cuff Size: Standard)   Pulse 86   Temp 98 °F (36.7 °C) (Tympanic)   Resp 15   Ht 5' 6" (1.676 m)   Wt 72.6 kg (160 lb)   SpO2 98%   BMI 25.82 kg/m²     Physical Exam  Constitutional:       General: He is not in acute distress. HENT:      Head: Normocephalic. Mouth/Throat:      Pharynx: No oropharyngeal exudate. Eyes:      General: No scleral icterus. Conjunctiva/sclera: Conjunctivae normal.      Pupils: Pupils are equal, round, and reactive to light. Neck:      Thyroid: No thyromegaly. Cardiovascular:      Rate and Rhythm: Normal rate and regular rhythm. Heart sounds: Normal heart sounds. No murmur heard. Pulmonary:      Effort: Pulmonary effort is normal. No respiratory distress. Breath sounds: Normal breath sounds. No wheezing or rales. Abdominal:      General: Bowel sounds are normal. There is no distension. Palpations: Abdomen is soft. Tenderness: There is no abdominal tenderness. There is no guarding or rebound. Musculoskeletal:         General: No tenderness. Cervical back: Neck supple. Lymphadenopathy:      Cervical: No cervical adenopathy. Skin:     Coloration: Skin is not pale. Findings: Erythema and rash present. Neurological:      Mental Status: He is alert and oriented to person, place, and time. Sensory: No sensory deficit. Motor: No weakness.        Zay Alexandra MD

## 2023-11-06 ENCOUNTER — HOSPITAL ENCOUNTER (OUTPATIENT)
Dept: MRI IMAGING | Facility: HOSPITAL | Age: 78
Discharge: HOME | End: 2023-11-06
Payer: MEDICARE

## 2023-11-06 ENCOUNTER — OFFICE VISIT (OUTPATIENT)
Dept: FAMILY MEDICINE CLINIC | Facility: CLINIC | Age: 78
End: 2023-11-06
Payer: MEDICARE

## 2023-11-06 VITALS
BODY MASS INDEX: 25.88 KG/M2 | SYSTOLIC BLOOD PRESSURE: 136 MMHG | RESPIRATION RATE: 14 BRPM | WEIGHT: 161 LBS | OXYGEN SATURATION: 98 % | DIASTOLIC BLOOD PRESSURE: 84 MMHG | HEART RATE: 88 BPM | TEMPERATURE: 97.7 F | HEIGHT: 66 IN

## 2023-11-06 DIAGNOSIS — R42 DIZZINESS: ICD-10-CM

## 2023-11-06 DIAGNOSIS — Z48.02 REMOVAL OF STAPLES: ICD-10-CM

## 2023-11-06 DIAGNOSIS — I48.20 CHRONIC ATRIAL FIBRILLATION (HCC): ICD-10-CM

## 2023-11-06 DIAGNOSIS — S09.90XD INJURY OF HEAD, SUBSEQUENT ENCOUNTER: Primary | ICD-10-CM

## 2023-11-06 DIAGNOSIS — S09.90XD INJURY OF HEAD, SUBSEQUENT ENCOUNTER: ICD-10-CM

## 2023-11-06 PROCEDURE — A9585 GADOBUTROL INJECTION: HCPCS | Performed by: INTERNAL MEDICINE

## 2023-11-06 PROCEDURE — 93000 ELECTROCARDIOGRAM COMPLETE: CPT | Performed by: INTERNAL MEDICINE

## 2023-11-06 PROCEDURE — 99214 OFFICE O/P EST MOD 30 MIN: CPT | Performed by: INTERNAL MEDICINE

## 2023-11-06 PROCEDURE — G1004 CDSM NDSC: HCPCS

## 2023-11-06 PROCEDURE — 70553 MRI BRAIN STEM W/O & W/DYE: CPT

## 2023-11-06 RX ORDER — GADOBUTROL 604.72 MG/ML
7 INJECTION INTRAVENOUS
Status: COMPLETED | OUTPATIENT
Start: 2023-11-06 | End: 2023-11-06

## 2023-11-06 RX ADMIN — GADOBUTROL 7 ML: 604.72 INJECTION INTRAVENOUS at 17:34

## 2023-11-06 NOTE — PROGRESS NOTES
Name: mAaris Johnson      : 1945      MRN: [id number]  Encounter Provider: Kelle Villatoro MD  Encounter Date: 2023   Encounter department: [address] [place] [address]     1. Injury of head, subsequent encounter  Comments:  Pt is doing  10 staples removed from his top of head  keep area clean and dry  RTC in 2 weeks  Orders:  -     MRI brain w wo contrast; Future; Expected date: 2023    2. Dizziness  Comments:  cause ? ? continue same  RTc in 2-3 weks w Blood work/MRI Head  FU w cardiology  Orders:  -     POCT ECG  -     Ambulatory Referral to Cardiology; Future  -     MRI brain w wo contrast; Future; Expected date: 2023    3. Chronic atrial fibrillation (HCC)  Comments:  stable  continue same  FU w cardiology  Orders:  -     POCT ECG  -     Ambulatory Referral to Cardiology; Future    4. Removal of staples  Comments:  10 staples removed at the office without any complications,...    RTC in 2-3 weeks w Blood work    Depression Screening and Follow-up Plan: Patient was screened for depression during today's encounter. They screened negative with a PHQ-2 score of 0. Subjective      69 Y O Man is here for Regular check up, and to remove staples off his head, and increased dizziness after head injury,... Review of Systems   Constitutional:  Negative for chills, fatigue and fever. HENT:  Negative for congestion, facial swelling, sore throat, trouble swallowing and voice change. Eyes:  Negative for pain, discharge and visual disturbance. Respiratory:  Negative for cough, shortness of breath and wheezing. Cardiovascular:  Negative for chest pain, palpitations and leg swelling. Gastrointestinal:  Negative for abdominal pain, blood in stool, constipation, diarrhea and nausea. Endocrine: Negative for polydipsia, polyphagia and polyuria. Genitourinary:  Negative for difficulty urinating, hematuria and urgency.    Musculoskeletal: Negative for arthralgias and myalgias. Skin:  Positive for wound. Negative for rash. Neurological:  Positive for dizziness. Negative for tremors, weakness and headaches. Hematological:  Negative for adenopathy. Does not bruise/bleed easily. Psychiatric/Behavioral:  Negative for dysphoric mood, sleep disturbance and suicidal ideas. Current Outpatient Medications on File Prior to Visit   Medication Sig    acetaminophen (TYLENOL) 325 mg tablet Take 2 tablets (650 mg total) by mouth every 6 (six) hours as needed for mild pain    allopurinol (ZYLOPRIM) 100 mg tablet Take 2 tablets (200 mg total) by mouth daily    apixaban (Eliquis) 5 mg Take 1 tablet (5 mg total) by mouth 2 (two) times a day    b complex-C-E-zinc (STRESS FORMULA/ZINC) tablet Take 1 tablet by mouth daily    clotrimazole-betamethasone (LOTRISONE) 1-0.05 % cream Apply topically 2 (two) times a day    dextromethorphan-guaiFENesin (ROBITUSSIN DM)  mg/5 mL syrup Take 5 mL by mouth 3 (three) times a day as needed for cough or congestion    Diclofenac Sodium  MG 24 hr tablet Take 1 tablet (100 mg total) by mouth daily With food/Lunch    digoxin (LANOXIN) 0.125 mg tablet Take 1 tablet (125 mcg total) by mouth daily    eszopiclone (LUNESTA) 2 mg tablet Take 1 tablet (2 mg total) by mouth daily at bedtime At 8 PM daily. ...     Melatonin 10 MG TABS Take 1 tablet (10 mg total) by mouth daily at bedtime    mupirocin (BACTROBAN) 2 % ointment Apply topically 3 (three) times a day    nebivolol (Bystolic) 10 mg tablet Take 1 tablet (10 mg total) by mouth daily    rosuvastatin (CRESTOR) 5 mg tablet Take 1 tablet (5 mg total) by mouth daily    tamsulosin (Flomax) 0.4 mg Take 1 capsule (0.4 mg total) by mouth daily with dinner    valsartan (DIOVAN) 40 mg tablet Take 1 tablet (40 mg total) by mouth daily    Vitamin E 90 MG (200 UNIT) capsule Take 200 Units by mouth daily    Imbruvica 140 MG CAPS Take 1 capsule (140 mg total) by mouth 2 (two) times a day (Patient not taking: Reported on 9/26/2023)       Objective     /84 (BP Location: Left arm, Patient Position: Sitting, Cuff Size: Standard)   Pulse 88   Temp 97.7 °F (36.5 °C) (Tympanic)   Resp 14   Ht 5' 6" (1.676 m)   Wt 73 kg (161 lb)   SpO2 98%   BMI 25.99 kg/m²     Physical Exam  Constitutional:       General: He is not in acute distress. HENT:      Head: Normocephalic. Mouth/Throat:      Pharynx: No oropharyngeal exudate. Eyes:      General: No scleral icterus. Conjunctiva/sclera: Conjunctivae normal.      Pupils: Pupils are equal, round, and reactive to light. Neck:      Thyroid: No thyromegaly. Cardiovascular:      Rate and Rhythm: Normal rate and regular rhythm. Heart sounds: Normal heart sounds. No murmur heard. Pulmonary:      Effort: Pulmonary effort is normal. No respiratory distress. Breath sounds: Normal breath sounds. No wheezing or rales. Abdominal:      General: Bowel sounds are normal. There is no distension. Palpations: Abdomen is soft. Tenderness: There is no abdominal tenderness. There is no guarding or rebound. Musculoskeletal:         General: No tenderness. Cervical back: Neck supple. Lymphadenopathy:      Cervical: No cervical adenopathy. Skin:     Coloration: Skin is not pale. Findings: No rash. Comments: Wound on top of head; 10 staples need to be removed   Neurological:      Mental Status: He is alert and oriented to person, place, and time. Sensory: No sensory deficit. Motor: No weakness.        Lia Guillermo MD

## 2023-11-06 NOTE — PROGRESS NOTES
BMI Counseling: Body mass index is 25.99 kg/m². The BMI is above normal. Nutrition recommendations include reducing portion sizes.

## 2023-11-13 DIAGNOSIS — L98.9 LESION OF FACE: Primary | ICD-10-CM

## 2023-11-15 ENCOUNTER — OFFICE VISIT (OUTPATIENT)
Dept: DERMATOLOGY | Facility: CLINIC | Age: 78
End: 2023-11-15
Payer: MEDICARE

## 2023-11-15 VITALS — TEMPERATURE: 97.5 F | WEIGHT: 165 LBS | BODY MASS INDEX: 26.52 KG/M2 | HEIGHT: 66 IN

## 2023-11-15 DIAGNOSIS — L57.0 ACTINIC KERATOSIS: ICD-10-CM

## 2023-11-15 DIAGNOSIS — D48.9 NEOPLASM OF UNCERTAIN BEHAVIOR: Primary | ICD-10-CM

## 2023-11-15 DIAGNOSIS — L82.1 SEBORRHEIC KERATOSIS: ICD-10-CM

## 2023-11-15 DIAGNOSIS — L57.8 OTHER SKIN CHANGES DUE TO CHRONIC EXPOSURE TO NONIONIZING RADIATION: ICD-10-CM

## 2023-11-15 DIAGNOSIS — L81.4 SOLAR LENTIGO: ICD-10-CM

## 2023-11-15 PROCEDURE — 11102 TANGNTL BX SKIN SINGLE LES: CPT | Performed by: DERMATOLOGY

## 2023-11-15 PROCEDURE — 88305 TISSUE EXAM BY PATHOLOGIST: CPT | Performed by: STUDENT IN AN ORGANIZED HEALTH CARE EDUCATION/TRAINING PROGRAM

## 2023-11-15 PROCEDURE — 17000 DESTRUCT PREMALG LESION: CPT | Performed by: DERMATOLOGY

## 2023-11-15 PROCEDURE — 88342 IMHCHEM/IMCYTCHM 1ST ANTB: CPT | Performed by: STUDENT IN AN ORGANIZED HEALTH CARE EDUCATION/TRAINING PROGRAM

## 2023-11-15 PROCEDURE — 99204 OFFICE O/P NEW MOD 45 MIN: CPT | Performed by: DERMATOLOGY

## 2023-11-15 NOTE — PROGRESS NOTES
[facility] Note     Patient Name: Eula Abraham  Encounter Date: 11/15/23     Have you been cared for by a [facility]ologist in the last 3 years and, if so, which description applies to you? NO. I am considered a "new" patient and must complete all patient intake questions. I am MALE/not capable of bearing children. REVIEW OF SYSTEMS:  Have you recently had or currently have any of the following? Recent fever or chills? No  Any non-healing wound? No   PAST MEDICAL HISTORY:  Have you personally ever had or currently have any of the following? If "YES," then please provide more detail. Skin cancer (such as Melanoma, Basal Cell Carcinoma, Squamous Cell Carcinoma? No  Tuberculosis, HIV/AIDS, Hepatitis B or C: No  Radiation Treatment No   HISTORY OF IMMUNOSUPPRESSION:   Do you have a history of any of the following:  Systemic Immunosuppression such as Diabetes, Biologic or Immunotherapy, Chemotherapy, Organ Transplantation, Bone Marrow Transplantation? No     Answering "YES" requires the addition of the dotphrase "IMMUNOSUPPRESSED" as the first diagnosis of the patient's visit. FAMILY HISTORY:  Any "first degree relatives" (parent, brother, sister, or child) with the following? Skin Cancer, Pancreatic or Other Cancer? No   PATIENT EXPERIENCE:    Do you want the Dermatologist to perform a COMPLETE skin exam today including a clinical examination under the "bra and underwear" areas? NO  If necessary, do we have your permission to call and leave a detailed message on your Preferred Phone number that includes your specific medical information?   Yes      Allergies   Allergen Reactions    Tetanus Antitoxin Rash     Patient told by mother medication was given as a child    Tetanus Toxoid     Tetanus Toxoids Other (See Comments)     Not specified    Tetanus Antitoxin Rash      Current Outpatient Medications:     acetaminophen (TYLENOL) 325 mg tablet, Take 2 tablets (650 mg total) by mouth every 6 (six) hours as needed for mild pain, Disp: , Rfl: 0    allopurinol (ZYLOPRIM) 100 mg tablet, Take 2 tablets (200 mg total) by mouth daily, Disp: 180 tablet, Rfl: 3    apixaban (Eliquis) 5 mg, Take 1 tablet (5 mg total) by mouth 2 (two) times a day, Disp: 180 tablet, Rfl: 3    b complex-C-E-zinc (STRESS FORMULA/ZINC) tablet, Take 1 tablet by mouth daily, Disp: 90 tablet, Rfl: 0    clotrimazole-betamethasone (LOTRISONE) 1-0.05 % cream, Apply topically 2 (two) times a day, Disp: 30 g, Rfl: 1    dextromethorphan-guaiFENesin (ROBITUSSIN DM)  mg/5 mL syrup, Take 5 mL by mouth 3 (three) times a day as needed for cough or congestion, Disp: 118 mL, Rfl: 1    Diclofenac Sodium  MG 24 hr tablet, Take 1 tablet (100 mg total) by mouth daily With food/Lunch, Disp: 30 tablet, Rfl: 0    digoxin (LANOXIN) 0.125 mg tablet, Take 1 tablet (125 mcg total) by mouth daily, Disp: 45 tablet, Rfl: 3    eszopiclone (LUNESTA) 2 mg tablet, Take 1 tablet (2 mg total) by mouth daily at bedtime At 8 PM daily. ..., Disp: 30 tablet, Rfl: 1    Imbruvica 140 MG CAPS, Take 1 capsule (140 mg total) by mouth 2 (two) times a day (Patient not taking: Reported on 9/26/2023), Disp: 180 capsule, Rfl: 3    Melatonin 10 MG TABS, Take 1 tablet (10 mg total) by mouth daily at bedtime, Disp: 30 tablet, Rfl: 3    mupirocin (BACTROBAN) 2 % ointment, Apply topically 3 (three) times a day, Disp: 45 g, Rfl: 0    nebivolol (Bystolic) 10 mg tablet, Take 1 tablet (10 mg total) by mouth daily, Disp: 90 tablet, Rfl: 3    rosuvastatin (CRESTOR) 5 mg tablet, Take 1 tablet (5 mg total) by mouth daily, Disp: 90 tablet, Rfl: 3    tamsulosin (Flomax) 0.4 mg, Take 1 capsule (0.4 mg total) by mouth daily with dinner, Disp: 90 capsule, Rfl: 3    valsartan (DIOVAN) 40 mg tablet, Take 1 tablet (40 mg total) by mouth daily, Disp: 180 tablet, Rfl: 3    Vitamin E 90 MG (200 UNIT) capsule, Take 200 Units by mouth daily, Disp: , Rfl:     Current Facility-Administered Medications:     cyanocobalamin injection 1,000 mcg, 1,000 mcg, Intramuscular, Q30 Days, Dashawn Flynn MD, 1,000 mcg at 08/14/23 1600          Whom besides the patient is providing clinical information about today's encounter? NO ADDITIONAL HISTORIAN (patient alone provided history)    Physical Exam and Assessment/Plan by Diagnosis:    PROCEDURE TANGENTIAL (SHAVE) BIOPSY NOTE:    Performing Physician:   Anatomic Location; Clinical Description with size (cm); Pre-Op Diagnosis:   A: left temple 1.5 cm with erythematous plaque with thick scale; getting larger in size. Ddx: inflamed seborrheic keratosis versus squamous cell carcinoma  Post-op diagnosis: Same     Local anesthesia: 1% xylocaine with epi      Topical anesthesia: None    Hemostasis: Electrocautery       After obtaining informed consent  at which time there was a discussion about the purpose of biopsy  and low risks of infection and bleeding. The area was prepped and draped in the usual fashion. Anesthesia was obtained with 1% lidocaine with epinephrine. A shave biopsy to an appropriate sampling depth was obtained by Shave (Dermablade or 15 blade) The resulting wound was covered with surgical ointment and bandaged appropriately. The patient tolerated the procedure well without complications and was without signs of functional compromise. Specimen has been sent for review by Dermatopathology. Standard post-procedure care has been explained and has been included in written form within the patient's copy of Informed Consent. INFORMED CONSENT DISCUSSION AND POST-OPERATIVE INSTRUCTIONS FOR PATIENT    I.  RATIONALE FOR PROCEDURE  I understand that a skin biopsy allows the Dermatologist to test a lesion or rash under the microscope to obtain a diagnosis. It usually involves numbing the area with numbing medication and removing a small piece of skin; sometimes the area will be closed with sutures.  In this specific procedure, sutures are not usually needed. If any sutures are placed, then they are usually need to be removed in 2 weeks or less. I understand that my Dermatologist recommends that a skin "shave" biopsy be performed today. A local anesthetic, similar to the kind that a dentist uses when filling a cavity, will be injected with a very small needle into the skin area to be sampled. The injected skin and tissue underneath "will go to sleep” and become numb so no pain should be felt afterwards. An instrument shaped like a tiny "razor blade" (shave biopsy instrument) will be used to cut a small piece of tissue and skin from the area so that a sample of tissue can be taken and examined more closely under the microscope. A slight amount of bleeding will occur, but it will be stopped with direct pressure and a pressure bandage and any other appropriate methods. I understands that a scar will form where the wound was created. Surgical ointment will be applied to help protect the wound. Sutures are not usually needed. II.  RISKS AND POTENTIAL COMPLICATIONS   I understand the risks and potential complications of a skin biopsy include but are not limited to the following:  Bleeding  Infection  Pain  Scar/keloid  Skin discoloration  Incomplete Removal  Recurrence  Nerve Damage/Numbness/Loss of Function  Allergic Reaction to Anesthesia  Biopsies are diagnostic procedures and based on findings additional treatment or evaluation may be required  Loss or destruction of specimen resulting in no additional findings    My Dermatologist has explained to me the nature of the condition, the nature of the procedure, and the benefits to be reasonably expected compared with alternative approaches. My Dermatologist has discussed the likelihood of major risks or complications of this procedure including the specific risks listed above, such as bleeding, infection, and scarring/keloid.   I understand that a scar is expected after this procedure. I understand that my physician cannot predict if the scar will form a "keloid," which extends beyond the borders of the wound that is created. A keloid is a thick, painful, and bumpy scar. A keloid can be difficult to treat, as it does not always respond well to therapy, which includes injecting cortisone directly into the keloid every few weeks. While this usually reduces the pain and size of the scar, it does not eliminate it. I understand that photographs may be taken before and after the procedure. These will be maintained as part of the medical providers confidential records and may not be made available to me. I further authorize the medical provider to use the photographs for teaching purposes or to illustrate scientific papers, books, or lectures if in his/her judgment, medical research, education, or science may benefit from its use. I have had an opportunity to fully inquire about the risks and benefits of this procedure and its alternatives. I have been given ample time and opportunity to ask questions and to seek a second opinion if I wished to do so. I acknowledge that there have specifically been no guarantees as to the cosmetic results from the procedure. I am aware that with any procedure there is always the possibility of an unexpected complication. III. POST-PROCEDURAL CARE (WHAT YOU WILL NEED TO DO "AFTER THE BIOPSY" TO OPTIMIZE HEALING)    Keep the area clean and dry. Try NOT to remove the bandage or get it wet for the first 24 hours. Gently clean the area and apply surgical ointment (such as Vaseline petrolatum ointment, which is available "over the counter" and not a prescription) to the biopsy site for up to 2 weeks straight. This acts to protect the wound from the outside world. Sutures are not usually placed in this procedure.   If any sutures were placed, return for suture removal as instructed (generally 1 week for the face, 2 weeks for the body). Take Acetaminophen (Tylenol) for discomfort, if no contraindications. Ibuprofen or aspirin could make bleeding worse. Call our office immediately for signs of infection: fever, chills, increased redness, warmth, tenderness, discomfort/pain, or pus or foul smell coming from the wound. WHAT TO DO IF THERE IS ANY BLEEDING? If a small amount of bleeding is noticed, place a clean cloth over the area and apply firm pressure for ten minutes. Check the wound after 10 minutes of direct pressure. If bleeding persists, try one more time for an additional 10 minutes of direct pressure on the area. If the bleeding becomes heavier or does not stop after the second attempt, or if you have any other questions about this procedure, then please call your [facility] by calling [phone] (SKIN). I hereby acknowledge that I have reviewed and verified the site with my Dermatologist and have requested and authorized my Dermatologist to proceed with the procedure. NEOPLASM OF UNCERTAIN BEHAVIOR OF SKIN    Physical Exam:  (Anatomic Location); (Size and Morphological Description); (Differential Diagnosis):  A: left temple 1.5 cm with erythematous plaque with thick scale; getting larger in size. Ddx: inflamed seborrheic keratosis versus squamous cell carcinoma      Additional History of Present Condition:  Had about 5 months, started small, got bigger. Does not itch, bleed, or hurt. Assessment and Plan:  I have discussed with the patient that a sample of skin via a "skin biopsy” would be potentially helpful to further make a specific diagnosis under the microscope. Based on a thorough discussion of this condition and the management approach to it (including a comprehensive discussion of the known risks, side effects and potential benefits of treatment), the patient (family) agrees to implement the following specific plan:    Procedure:  Skin Biopsy.   After a thorough discussion of treatment options and risk/benefits/alternatives (including but not limited to local pain, scarring, dyspigmentation, blistering, possible superinfection, and inability to confirm a diagnosis via histopathology), verbal and written consent were obtained and portion of the rash was biopsied for tissue sample. See below for consent that was obtained from patient and subsequent Procedure Note. ACTINIC KERATOSIS  Physical Exam:  Anatomic Location Affected:  Scalp  Morphological Description: Thin pink papule with gritty scale       Assessment and Plan:  Based on a thorough discussion of this condition and the management approach to it (including a comprehensive discussion of the known risks, side effects and potential benefits of treatment), the patient (family) agrees to implement the following specific plan:  Treated with cryotherapy today; written and verbal consent obtained     PROCEDURE:  DESTRUCTION OF PRE-MALIGNANT LESIONS  After a thorough discussion of treatment options and risk/benefits/alternatives (including but not limited to local pain, scarring, dyspigmentation, blistering, and possible superinfection), verbal and written consent were obtained and the aforementioned lesions were treated on with cryotherapy using liquid nitrogen x 2 cycles for 5-10 seconds. The patient tolerated the procedure well, and after-care instructions were provided. TOTAL NUMBER of 1 pre-malignant lesions were treated today on the ANATOMIC LOCATION: scalp. Patient instructions: Your pre-cancerous lesions (called actinic keratosis) were treated with liquid nitrogen today. The treated areas will get more red, crusted over the next few days. There might be some blistering. Apply vaseline to the treated area for the next week to help it heal fully. Do not pick at the area. Return in 3-4 weeks for another round of liquid nitrogen treatment if lesion(s)  fails to fully resolve.               SEBORRHEIC KERATOSIS; NON-INFLAMED     Physical Exam:  Anatomic Location Affected:  scalp and face  Morphological Description:  Waxy, smooth to warty textured, yellow to brownish-grey to dark brown to blackish, discrete, "stuck-on" appearing papules. Present for years. Denies pain, itch, bleeding. Additional History of Present Condition:  Present constantly; no modifying factors which make it worse or better. No prior treatment. Assessment and Plan:  Based on a thorough discussion of this condition and the management approach to it (including a comprehensive discussion of the known risks, side effects and potential benefits of treatment), the patient (family) agrees to implement the following specific plan:  Reassure benign  Use sun protection. Apply SPF 30 or higher at least three times a day. Wear sun protecting clothing and hats. SOLAR LENTIGINES   OTHER SKIN CHANGES DUE TO CHRONIC EXPOSURE TO NONIONIZING RADIATION     Physical Exam:  Anatomic Location Affected:  scalp and face  Morphological Description:  Multiple scattered brown to tan evenly pigmented macules   Denies pain, itch, bleeding. No treatments tried. Present for months - years. Reports getting newer lesions with sun exposure. Assessment and Plan:  Based on a thorough discussion of this condition and the management approach to it (including a comprehensive discussion of the known risks, side effects and potential benefits of treatment), the patient (family) agrees to implement the following specific plan:  Reassure benign  Use sun protection. Apply SPF 30 or higher at least three times a day. Wear sun protecting clothing and hats. Worrisome signs of skin malignancy discussed, questions answered. Regular self-skin check discussed. Advised to call or return to office if patient notices any spots of concern, rapidly growing/changing lesions, bleeding lesions, non-healing lesions. Advised regular SPF use.        Scribe Attestation I,:  Meliton Call am acting as a scribe while in the presence of the attending physician.:       I,:  Alvaro Elise MD personally performed the services described in this documentation    as scribed in my presence.:

## 2023-11-22 PROCEDURE — 88305 TISSUE EXAM BY PATHOLOGIST: CPT | Performed by: STUDENT IN AN ORGANIZED HEALTH CARE EDUCATION/TRAINING PROGRAM

## 2023-11-22 PROCEDURE — 88342 IMHCHEM/IMCYTCHM 1ST ANTB: CPT | Performed by: STUDENT IN AN ORGANIZED HEALTH CARE EDUCATION/TRAINING PROGRAM

## 2023-11-28 ENCOUNTER — TELEPHONE (OUTPATIENT)
Dept: DERMATOLOGY | Facility: CLINIC | Age: 78
End: 2023-11-28

## 2023-12-23 PROBLEM — S01.01XA SCALP LACERATION: Status: RESOLVED | Noted: 2023-10-24 | Resolved: 2023-12-23

## 2023-12-27 ENCOUNTER — RA CDI HCC (OUTPATIENT)
Dept: OTHER | Facility: HOSPITAL | Age: 78
End: 2023-12-27

## 2023-12-27 NOTE — PROGRESS NOTES
HCC coding opportunities          Chart Reviewed number of suggestions sent to Provider: 2     Patients Insurance   E11.51 and I11.0  Medicare Insurance: Medicare

## 2024-01-03 ENCOUNTER — TELEPHONE (OUTPATIENT)
Dept: FAMILY MEDICINE CLINIC | Facility: CLINIC | Age: 79
End: 2024-01-03

## 2024-01-03 ENCOUNTER — OFFICE VISIT (OUTPATIENT)
Dept: FAMILY MEDICINE CLINIC | Facility: CLINIC | Age: 79
End: 2024-01-03
Payer: MEDICARE

## 2024-01-03 VITALS
HEART RATE: 72 BPM | RESPIRATION RATE: 14 BRPM | BODY MASS INDEX: 26.52 KG/M2 | TEMPERATURE: 98.9 F | DIASTOLIC BLOOD PRESSURE: 82 MMHG | OXYGEN SATURATION: 99 % | WEIGHT: 165 LBS | SYSTOLIC BLOOD PRESSURE: 136 MMHG | HEIGHT: 66 IN

## 2024-01-03 DIAGNOSIS — N40.0 ENLARGED PROSTATE: ICD-10-CM

## 2024-01-03 DIAGNOSIS — I50.42 CHRONIC COMBINED SYSTOLIC AND DIASTOLIC HEART FAILURE (HCC): ICD-10-CM

## 2024-01-03 DIAGNOSIS — M1A.29X0 DRUG-INDUCED CHRONIC GOUT OF MULTIPLE SITES WITHOUT TOPHUS: ICD-10-CM

## 2024-01-03 DIAGNOSIS — E78.5 HYPERLIPIDEMIA ASSOCIATED WITH TYPE 2 DIABETES MELLITUS: ICD-10-CM

## 2024-01-03 DIAGNOSIS — E11.69 HYPERLIPIDEMIA ASSOCIATED WITH TYPE 2 DIABETES MELLITUS: ICD-10-CM

## 2024-01-03 DIAGNOSIS — I27.20 MILD PULMONARY HYPERTENSION (HCC): Primary | ICD-10-CM

## 2024-01-03 DIAGNOSIS — I10 ESSENTIAL HYPERTENSION, MALIGNANT: ICD-10-CM

## 2024-01-03 DIAGNOSIS — I48.20 CHRONIC ATRIAL FIBRILLATION (HCC): ICD-10-CM

## 2024-01-03 DIAGNOSIS — M35.3 PMR (POLYMYALGIA RHEUMATICA) (HCC): Primary | ICD-10-CM

## 2024-01-03 DIAGNOSIS — I48.91 RAPID ATRIAL FIBRILLATION (HCC): ICD-10-CM

## 2024-01-03 DIAGNOSIS — C91.10 CHRONIC LYMPHOCYTIC LEUKEMIA (HCC): ICD-10-CM

## 2024-01-03 DIAGNOSIS — D32.9 MENINGIOMA (HCC): ICD-10-CM

## 2024-01-03 DIAGNOSIS — I73.9 PERIPHERAL VASCULAR DISEASE (HCC): ICD-10-CM

## 2024-01-03 PROCEDURE — 99214 OFFICE O/P EST MOD 30 MIN: CPT | Performed by: INTERNAL MEDICINE

## 2024-01-03 RX ORDER — ROSUVASTATIN CALCIUM 5 MG/1
5 TABLET, COATED ORAL DAILY
Qty: 90 TABLET | Refills: 3 | Status: SHIPPED | OUTPATIENT
Start: 2024-01-03

## 2024-01-03 RX ORDER — VALSARTAN 40 MG/1
40 TABLET ORAL DAILY
Qty: 180 TABLET | Refills: 3 | Status: SHIPPED | OUTPATIENT
Start: 2024-01-03

## 2024-01-03 RX ORDER — NEBIVOLOL 10 MG/1
10 TABLET ORAL DAILY
Qty: 90 TABLET | Refills: 3 | Status: SHIPPED | OUTPATIENT
Start: 2024-01-03

## 2024-01-03 RX ORDER — PREDNISONE 5 MG/1
5 TABLET ORAL
Qty: 30 TABLET | Refills: 0 | Status: SHIPPED | OUTPATIENT
Start: 2024-01-03

## 2024-01-03 RX ORDER — DIGOXIN 125 MCG
125 TABLET ORAL DAILY
Qty: 45 TABLET | Refills: 3 | Status: SHIPPED | OUTPATIENT
Start: 2024-01-03

## 2024-01-03 RX ORDER — ALLOPURINOL 100 MG/1
200 TABLET ORAL DAILY
Qty: 180 TABLET | Refills: 3 | Status: SHIPPED | OUTPATIENT
Start: 2024-01-03

## 2024-01-03 NOTE — PROGRESS NOTES
Name: Jose Cruz Alejandro      : 1945      MRN: [id number]  Encounter Provider: Dashawn Starkey MD  Encounter Date: 1/3/2024   Encounter department: [facility]    Assessment & Plan     1. Mild pulmonary hypertension (HCC)  Comments:  stable    2. Chronic atrial fibrillation (HCC)  Comments:  stable  continue same  FU w cardiology  Orders:  -     valsartan (DIOVAN) 40 mg tablet; Take 1 tablet (40 mg total) by mouth daily  -     Digoxin level; Future  -     UA (URINE) with reflex to Scope; Future; Expected date: 01/10/2024  -     PSA Total, Diagnostic; Future  -     Magnesium; Future    3. Chronic combined systolic and diastolic heart failure (HCC)  Comments:  as above  Orders:  -     valsartan (DIOVAN) 40 mg tablet; Take 1 tablet (40 mg total) by mouth daily    4. BMI 26.0-26.9,adult  -     rosuvastatin (CRESTOR) 5 mg tablet; Take 1 tablet (5 mg total) by mouth daily  -     apixaban (ELIQUIS) 2.5 mg; Take 1 tablet (2.5 mg total) by mouth 2 (two) times a day    5. Hyperlipidemia associated with type 2 diabetes mellitus   -     Comprehensive metabolic panel; Future; Expected date: 2024  -     CBC and differential; Future; Expected date: 2024  -     Lipid Panel with Direct LDL reflex; Future; Expected date: 2024  -     TSH, 3rd generation with Free T4 reflex; Future; Expected date: 2024  -     Microalbumin, Random Urine (W/Creatinine) ([facility] ONLY); Future; Expected date: 2024  -     Microalbumin, Random Urine (W/Creatinine) ([facility] ONLY)  -     rosuvastatin (CRESTOR) 5 mg tablet; Take 1 tablet (5 mg total) by mouth daily  -     Digoxin level; Future  -     UA (URINE) with reflex to Scope; Future; Expected date: 01/10/2024  -     PSA Total, Diagnostic; Future  -     Magnesium; Future    6. Essential hypertension, malignant  -     nebivolol (Bystolic) 10 mg tablet; Take 1 tablet (10 mg total) by mouth daily    7. Rapid atrial fibrillation  (HCC)  Comments:  Improved   continue same  Orders:  -     digoxin (LANOXIN) 0.125 mg tablet; Take 1 tablet (125 mcg total) by mouth daily    8. 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 Peripheral vascular disease (HCC)  Comments:  stable    10. Meningioma (HCC)  Comments:  stable  continue same  repeat MRI head  Orders:  -     Digoxin level; Future  -     UA (URINE) with reflex to Scope; Future; Expected date: 01/10/2024  -     PSA Total, Diagnostic; Future  -     Magnesium; Future  -     apixaban (ELIQUIS) 2.5 mg; Take 1 tablet (2.5 mg total) by mouth 2 (two) times a day    11. Chronic lymphocytic leukemia (HCC)  Comments:  in Remission  FU w Hematology  Orders:  -     Digoxin level; Future  -     UA (URINE) with reflex to Scope; Future; Expected date: 01/10/2024  -     PSA Total, Diagnostic; Future  -     Magnesium; Future    12. Enlarged prostate  -     UA (URINE) with reflex to Scope; Future; Expected date: 01/10/2024  -     PSA Total, Diagnostic; Future    RTC in 3 mos w Blood work       Subjective      78 Y O Man is here for Regular Check up, he has increased arthritis, recent Blood work and med list reviewed w Pt,...      Review of Systems   Constitutional:  Negative for chills, fatigue and fever.   HENT:  Positive for postnasal drip. Negative for congestion, facial swelling, sore throat, trouble swallowing and voice change.    Eyes:  Negative for pain, discharge and visual disturbance.   Respiratory:  Negative for cough, shortness of breath and wheezing.    Cardiovascular:  Negative for chest pain, palpitations and leg swelling.   Gastrointestinal:  Negative for abdominal pain, blood in stool, constipation, diarrhea and nausea.   Endocrine: Negative for polydipsia, polyphagia and polyuria.   Genitourinary:  Negative for difficulty urinating, hematuria and urgency.    Musculoskeletal:  Positive for arthralgias. Negative for myalgias.   Skin:  Negative for rash.   Neurological:  Negative for dizziness, tremors, weakness and headaches.   Hematological:  Negative for adenopathy. Does not bruise/bleed easily.   Psychiatric/Behavioral:  Negative for dysphoric mood, sleep disturbance and suicidal ideas.        Current Outpatient Medications on File Prior to Visit   Medication Sig    acetaminophen (TYLENOL) 325 mg tablet Take 2 tablets (650 mg total) by mouth every 6 (six) hours as needed for mild pain    b complex-C-E-zinc (STRESS FORMULA/ZINC) tablet Take 1 tablet by mouth daily    Diclofenac Sodium  MG 24 hr tablet Take 1 tablet (100 mg total) by mouth daily With food/Lunch    tamsulosin (Flomax) 0.4 mg Take 1 capsule (0.4 mg total) by mouth daily with dinner    [DISCONTINUED] allopurinol (ZYLOPRIM) 100 mg tablet Take 2 tablets (200 mg total) by mouth daily    [DISCONTINUED] apixaban (Eliquis) 5 mg Take 1 tablet (5 mg total) by mouth 2 (two) times a day    [DISCONTINUED] digoxin (LANOXIN) 0.125 mg tablet Take 1 tablet (125 mcg total) by mouth daily    [DISCONTINUED] nebivolol (Bystolic) 10 mg tablet Take 1 tablet (10 mg total) by mouth daily    [DISCONTINUED] rosuvastatin (CRESTOR) 5 mg tablet Take 1 tablet (5 mg total) by mouth daily    [DISCONTINUED] valsartan (DIOVAN) 40 mg tablet Take 1 tablet (40 mg total) by mouth daily    eszopiclone (LUNESTA) 2 mg tablet Take 1 tablet (2 mg total) by mouth daily at bedtime At 8 PM daily.... (Patient not taking: Reported on 11/15/2023)    Imbruvica 140 MG CAPS Take 1 capsule (140 mg total) by mouth 2 (two) times a day (Patient not taking: Reported on 9/26/2023)    [DISCONTINUED] clotrimazole-betamethasone (LOTRISONE) 1-0.05 % cream Apply topically 2 (two) times a day (Patient not taking: Reported on 11/15/2023)    [DISCONTINUED] dextromethorphan-guaiFENesin (ROBITUSSIN DM)  mg/5 mL syrup Take 5 mL by mouth 3 (three) times a day  "as needed for cough or congestion    [DISCONTINUED] Melatonin 10 MG TABS Take 1 tablet (10 mg total) by mouth daily at bedtime (Patient not taking: Reported on 11/15/2023)    [DISCONTINUED] mupirocin (BACTROBAN) 2 % ointment Apply topically 3 (three) times a day (Patient not taking: Reported on 11/15/2023)    [DISCONTINUED] Vitamin E 90 MG (200 UNIT) capsule Take 200 Units by mouth daily (Patient not taking: Reported on 11/15/2023)       Objective     /82 (BP Location: Left arm, Patient Position: Sitting, Cuff Size: Standard)   Pulse 72   Temp 98.9 °F (37.2 °C) (Tympanic)   Resp 14   Ht 5' 6\" (1.676 m)   Wt 74.8 kg (165 lb)   SpO2 99%   BMI 26.63 kg/m²     Physical Exam  Constitutional:       General: He is not in acute distress.  HENT:      Head: Normocephalic.      Mouth/Throat:      Pharynx: No oropharyngeal exudate.   Eyes:      General: No scleral icterus.     Conjunctiva/sclera: Conjunctivae normal.      Pupils: Pupils are equal, round, and reactive to light.   Neck:      Thyroid: No thyromegaly.   Cardiovascular:      Rate and Rhythm: Normal rate and regular rhythm.      Heart sounds: Normal heart sounds. No murmur heard.  Pulmonary:      Effort: Pulmonary effort is normal. No respiratory distress.      Breath sounds: Normal breath sounds. No wheezing or rales.   Abdominal:      General: Bowel sounds are normal. There is no distension.      Palpations: Abdomen is soft.      Tenderness: There is no abdominal tenderness. There is no guarding or rebound.   Musculoskeletal:         General: Tenderness present.      Cervical back: Neck supple.   Lymphadenopathy:      Cervical: No cervical adenopathy.   Skin:     Coloration: Skin is not pale.      Findings: No rash.   Neurological:      Mental Status: He is alert and oriented to person, place, and time.      Sensory: No sensory deficit.      Motor: No weakness.       Dashawn Starkey MD    "

## 2024-01-03 NOTE — TELEPHONE ENCOUNTER
Patient forgot to tell you his arthritis is getting worse.  He has to take Voltaren every day.     Please advise.

## 2024-02-16 ENCOUNTER — NURSE TRIAGE (OUTPATIENT)
Age: 79
End: 2024-02-16

## 2024-02-16 DIAGNOSIS — J06.9 ACUTE URI: Primary | ICD-10-CM

## 2024-02-16 RX ORDER — AMOXICILLIN AND CLAVULANATE POTASSIUM 875; 125 MG/1; MG/1
1 TABLET, FILM COATED ORAL EVERY 12 HOURS SCHEDULED
Qty: 14 TABLET | Refills: 0 | Status: SHIPPED | OUTPATIENT
Start: 2024-02-16 | End: 2024-02-23

## 2024-02-16 RX ORDER — OFLOXACIN 3 MG/ML
SOLUTION AURICULAR (OTIC)
Qty: 5 ML | Refills: 0 | Status: SHIPPED | OUTPATIENT
Start: 2024-02-16

## 2024-02-16 NOTE — TELEPHONE ENCOUNTER
"Patient states he has had this yellow drainage from left ear for over a week and half now, denies other symptoms and no fever. Patient would like to know if he could get a Rx for something without coming in, did explain that's not likely and PCP would probably want him to make an office visit, patient verbalized understanding , but would still like to see if PCP would order ear drops.       Reason for Disposition   Yellow or green discharge    Answer Assessment - Initial Assessment Questions  1. LOCATION: \"Which ear is involved?\"       Left ear  2. COLOR: \"What is the color of the discharge?\"       yellow  4. ONSET: \"When did you first notice the discharge?\"      1.5 weeks ago  5. PAIN: \"Is there any earache?\" \"How bad is it?\"  (Scale 1-10; or mild, moderate, severe)      denies  7. OTHER SYMPTOMS: \"Do you have any other symptoms?\" (e.g., headache, fever, dizziness, vomiting, runny nose)      denies    Protocols used: Ear - Discharge-ADULT-[place]    "

## 2024-02-16 NOTE — TELEPHONE ENCOUNTER
Jose Cruz called in and stated that he has an ear infection in his left ear. Patient stated that he would like Dr. Starkey to call something into the pharmacy. Jose Cruz advised me that he works with Dr. Starkey and that he always call an antibiotic in for him when needed. I did advise that the doctor normally doesn't call in medication with out seeing the patients.    Please call Jose Cruz back at  and advise.    Thank you.

## 2024-03-01 DIAGNOSIS — M19.90 ARTHRITIS: ICD-10-CM

## 2024-03-04 DIAGNOSIS — M19.90 ARTHRITIS: ICD-10-CM

## 2024-03-06 ENCOUNTER — OFFICE VISIT (OUTPATIENT)
Dept: CARDIOLOGY CLINIC | Facility: CLINIC | Age: 79
End: 2024-03-06
Payer: MEDICARE

## 2024-03-06 ENCOUNTER — TELEPHONE (OUTPATIENT)
Dept: CARDIOLOGY CLINIC | Facility: CLINIC | Age: 79
End: 2024-03-06

## 2024-03-06 VITALS
SYSTOLIC BLOOD PRESSURE: 130 MMHG | DIASTOLIC BLOOD PRESSURE: 64 MMHG | HEART RATE: 82 BPM | WEIGHT: 161.8 LBS | HEIGHT: 66 IN | BODY MASS INDEX: 26 KG/M2

## 2024-03-06 DIAGNOSIS — I48.20 CHRONIC ATRIAL FIBRILLATION (HCC): ICD-10-CM

## 2024-03-06 DIAGNOSIS — I42.8 NONISCHEMIC CARDIOMYOPATHY (HCC): ICD-10-CM

## 2024-03-06 DIAGNOSIS — I48.19 PERSISTENT ATRIAL FIBRILLATION (HCC): Primary | ICD-10-CM

## 2024-03-06 DIAGNOSIS — I73.9 PERIPHERAL VASCULAR DISEASE (HCC): ICD-10-CM

## 2024-03-06 DIAGNOSIS — R42 DIZZINESS: ICD-10-CM

## 2024-03-06 DIAGNOSIS — I34.0 MODERATE MITRAL VALVE REGURGITATION: ICD-10-CM

## 2024-03-06 DIAGNOSIS — I10 PRIMARY HYPERTENSION: ICD-10-CM

## 2024-03-06 DIAGNOSIS — I27.20 MILD PULMONARY HYPERTENSION (HCC): ICD-10-CM

## 2024-03-06 PROCEDURE — G2211 COMPLEX E/M VISIT ADD ON: HCPCS | Performed by: INTERNAL MEDICINE

## 2024-03-06 PROCEDURE — 99214 OFFICE O/P EST MOD 30 MIN: CPT | Performed by: INTERNAL MEDICINE

## 2024-03-06 NOTE — TELEPHONE ENCOUNTER
Samples of Eliquis 2.5mg given at appointment today per Dr. Sanchez.    LOT: [id number]  Exp: Aug 2024  Dispensed: 2 boxes    LOT: [id number]  Exp: April 2024  Dispensed: 2 boxes

## 2024-03-06 NOTE — PATIENT INSTRUCTIONS
CARDIOLOGY ASSESSMENT & PLAN     1. Persistent atrial fibrillation (HCC)        2. Peripheral vascular disease (HCC)        3. Moderate mitral valve regurgitation        4. 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 Primary hypertension        7. Dizziness  Ambulatory Referral to Cardiology    cause ??  continue same  RTc in 2-3 weks w Blood work/MRI Head  FU w cardiology      8. Chronic atrial fibrillation (HCC)  Ambulatory Referral to Cardiology    stable  continue same  FU w cardiology        Persistent atrial fibrillation (HCC)  Mr. Jose Cruz Alejandro is overall doing well from cardiac perspective with no recent symptoms that suggest rapid heart rate from his persistent atrial fibrillation or symptoms of heart failure.  He is taking Eliquis once a day only because of the high co-pay of this medicine.  He gets samples from his primary care physician and he makes to with that.  On examination there is no evidence of pulmonary or peripheral vascular congestion.    -- At this time I am advising him to continue current medications.  -- I am advising him to consider if he can get the medication Eliquis at affordable cost from  other online pharmacies.  -- Advising him to monitor his blood pressure from time to time.  -- Will plan to see him back in 6 months time.  Earlier if necessary.     -- Patient is advised to continue current medical therapy.  -- Dietary and medical compliance are reinforced.  -- He is advised  to report any concerning symptoms such as chest pain, shortness of breath, decline in exercise tolerance or presyncope/syncope.

## 2024-03-06 NOTE — ASSESSMENT & PLAN NOTE
Mr. Jose Cruz Alejandro is overall doing well from cardiac perspective with no recent symptoms that suggest rapid heart rate from his persistent atrial fibrillation or symptoms of heart failure.  He is taking Eliquis once a day only because of the high co-pay of this medicine.  He gets samples from his primary care physician and he makes to with that.  On examination there is no evidence of pulmonary or peripheral vascular congestion.    -- At this time I am advising him to continue current medications.  -- I am advising him to consider if he can get the medication Eliquis at affordable cost from  other online pharmacies.  -- Advising him to monitor his blood pressure from time to time.  -- Will plan to see him back in 6 months time.  Earlier if necessary.     -- Patient is advised to continue current medical therapy.  -- Dietary and medical compliance are reinforced.  -- He is advised  to report any concerning symptoms such as chest pain, shortness of breath, decline in exercise tolerance or presyncope/syncope.

## 2024-03-06 NOTE — PROGRESS NOTES
[facility]  [address] [place] [address]  Phone#  [phone]. Fax#  [phone]  *-*-*-*-*-*-*-*-*-*-*-*-*-*-*-*-*-*-*-*-*-*-*-*-*-*-*-*-*-*-*-*-*-*-*-*-*-*-*-*-*-*-*-*-*-*-*-*-*-*-*-*-*-*  ENCOUNTER DATE: 03/06/24 10:00 AM  PATIENT NAME: Jose Cruz Alejandro   1945    [id number]  AGE:78 y.o.      SEX: male  ENCOUNTER PROVIDER:Mely Sanchez MD     PRIMARY CARE PHYSICIAN: Dashawn Starkey MD    DIAGNOSES:  1. Atrial flutter and atrial fibrillation (initially diagnosed February 2020)- status post cardioversion in June 2020 followed by recurrence, now persistent atrial fibrillation  2. Chronic lymphocytic leukemia initially diagnosed in 2013, now with relapse, currently on ibrutinib and rituximab  3. Intracranial meningioma along the undersurface of right cerebellar tentorium diagnosed in 2017  4. Degenerative joint disease with arthritis  5. Chronic gout  6. Benign prostatic hypertrophy  7. Essential hypertension  8. Cardiomyopathy, unspecified, heart failure with mid range ejection fraction, EF 46 percent  9. Mitral valve regurgitation  10. Pulmonary hypertension    ECHOCARDIOGRAM August 2021: Dilated left ventricular cavity, LVIDd 5.8 cm, mild to moderately reduced left ventricular systolic function with global hypokinesis, EF 40%, mild left atrial cavity enlargement, bowing interatrial septum towards right side, mild-to-moderate mitral valve regurgitation, mild-to-moderate tricuspid valve regurgitation, trace pulmonic valve regurgitation.     HOLTER MONITOR August 2021 :  Patient was in atrial fibrillation rhythm throughout with average heart rate of 78 and minimum heart rate of 40 and maximum heart rate of 178 beats per minute.  There was no significant bradyarrhythmia or pause events.  There were rare ventricular ectopic beats.    ECHOCARDIOGRAM March 3, 2020, mildly reduced left ventricular systolic function with global hypokinesis, EF of 46 percent, mild left ventricular cavity  enlargement, indeterminate diastolic dysfunction, mild left atrial cavity enlargement, moderate mitral valve regurgitation, mild mitral annular calcification, mild to moderate tricuspid valve and trace pulmonic valve regurgitation, mild pulmonary hypertension with estimated RVSP/PASP 42 mmHg, no pericardial effusion.       CURRENT ECG   No results found for this visit on 03/06/24.        CARDIOLOGY ASSESSMENT & PLAN     1. Persistent atrial fibrillation (HCC)        2. Peripheral vascular disease (HCC)        3. Moderate mitral valve regurgitation        4. 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 Primary hypertension        7. Dizziness  Ambulatory Referral to Cardiology    cause ??  continue same  RTc in 2-3 weks w Blood work/MRI Head  FU w cardiology      8. Chronic atrial fibrillation (HCC)  Ambulatory Referral to Cardiology    stable  continue same  FU w cardiology        Persistent atrial fibrillation (HCC)  Mr. Jose Cruz Alejandro is overall doing well from cardiac perspective with no recent symptoms that suggest rapid heart rate from his persistent atrial fibrillation or symptoms of heart failure.  He is taking Eliquis once a day only because of the high co-pay of this medicine.  He gets samples from his primary care physician and he makes to with that.  On examination there is no evidence of pulmonary or peripheral vascular congestion.    -- At this time I am advising him to continue current medications.  -- I am advising him to consider if he can get the medication Eliquis at affordable cost from  other online pharmacies.  -- Advising him to monitor his blood pressure from time to time.  -- Will plan to see him back in 6 months time.  Earlier if necessary.     -- Patient is advised to continue current medical therapy.  -- Dietary and medical compliance are reinforced.  -- He is advised  to report any concerning symptoms such as chest pain, shortness  of breath, decline in exercise tolerance or presyncope/syncope.     INTERVAL HISTORY, HISTORY OF PRESENT ILLNESS & COMPREHENSIVE VISIT INFORMATION     Jose Cruz Alejandro is here for follow-up regarding his cardiac comorbidities which include:  Atrial flutter and atrial fibrillation, cardiomyopathy, hypertension and other comorbidities.  He was last seen by me in March 2023.    He mentions that since last year he has had no major diagnosis or hospitalizations or significant illnesses.  He does report that about 2 to 3 months back he had a mechanical fall as he was removing ladder from his truck he fell down hitting the concrete and had a huge laceration on his head.  He was taken to emergency room and required stapling.  Since then this has healed well.  He has had no recent falls since then.  He denies any loss of consciousness with that event.  He does mention that since he had that fall he feels transiently dizzy when he gets in the morning but after 3-5 steps this resolves and he no longer experiences this.  He reports that he continues to receive treatment for CLL and is on ibrutinib therapy.  He follows with hematologist Dr. Freedman at [facility].  Denies excessive bruising or any bleeding recently.  Denies orthopnea PND palpitations presyncope/syncope.     Functional capacity status:  Good   (Excellent- >10 METs; Good: (7-10 METs); Moderate (4-7 METs); Poor (<= 4 METs)     Any chronic stressors:  None   (feeling of poor health, financial problems, and social isolation etc).     Tobacco or alcohol dependence:  He has never been a smoker.  He has 1 alcoholic drink a week     Current cardiac medications:  Valsartan 40 mg daily, nebivolol 10 mg daily, digoxin 125 mcg daily.  He takes Eliquis 5 mg only once a day.    Blood work 1/23/2424:  Sodium 143 potassium 4.4 chloride 106 bicarb 29 BUN 15 creatinine 1.06 GFR 72  Normal LFTs total protein decreased at 5.8  Hemoglobin 16.4 hematocrit 50.5 in August  "2023  Hemoglobin A1c 5.9 August 2023  TSH 2.70 Free T40.6 In August 2023  CRP 9.9 August 2023  Digoxin level less than 0.2 June 2023    The 10-year ASCVD risk score (Mira ARNOLD, et al., 2019) is: 51%    Values used to calculate the score:      Age: 78 years      Sex: Male      Is Non- : No      Diabetic: Yes      Tobacco smoker: No      Systolic Blood Pressure: 130 mmHg      Is BP treated: Yes      HDL Cholesterol: 68 mg/dL      Total Cholesterol: 225 mg/dL  (Low risk: Less than <5%; borderline risk: ?5% to <7.5%; intermediate risk: ?7.5% to <20%; high risk:?20%)  Patient's ASCVD risk category: High    REVIEW OF SYSTEMS   Positive for: As noted above in HPI  Negative for: All remaining as reviewed below and in HPI.    SYSTEM SYMPTOMS REVIEWED:  General--weight change, fever, night sweats  Respiratory--cough, wheezing, shortness of breath, sputum production  Cardiovascular--chest pain, syncope, dyspnea on exertion, edema, decline in exercise tolerance, claudication   Gastrointestinal--persistent vomiting, diarrhea, abdominal distention, blood in stool   Muscular or skeletal--joint pain or swelling   Neurologic--headaches, syncope, abnormal movement  Hematologic--history of easy bruising and bleeding   Endocrine--thyroid enlargement, heat or cold intolerance, polyuria   Psychiatric--anxiety, depression     *-*-*-*-*-*-*-*-*-*-*-*-*-*-*-*-*-*-*-*-*-*-*-*-*-*-*-*-*-*-*-*-*-*-*-*-*-*-*-*-*-*-*-*-*-*-*-*-*-*-*-*-*-*-  VITAL SIGNS     CURRENT VITAL SIGNS:   Vitals:    03/06/24 0914   BP: 130/64   BP Location: Left arm   Patient Position: Sitting   Cuff Size: Large   Pulse: 82   Weight: 73.4 kg (161 lb 12.8 oz)   Height: 5' 6\" (1.676 m)       BMI: Body mass index is 26.12 kg/m².    WEIGHTS:   Wt Readings from Last 25 Encounters:   03/06/24 73.4 kg (161 lb 12.8 oz)   01/03/24 74.8 kg (165 lb)   11/15/23 74.8 kg (165 lb)   11/06/23 73 kg (161 lb)   10/31/23 72.6 kg (160 lb)   10/26/23 73.5 kg (162 lb) "   10/24/23 74.1 kg (163 lb 5.8 oz)   09/26/23 73.9 kg (163 lb)   08/14/23 71.7 kg (158 lb)   06/20/23 72.6 kg (160 lb)   03/10/23 72.6 kg (160 lb)   03/08/23 72.9 kg (160 lb 12.8 oz)   11/01/22 72.6 kg (160 lb)   08/23/22 74.2 kg (163 lb 9.6 oz)   07/19/22 72.5 kg (159 lb 12.8 oz)   09/07/21 72.6 kg (160 lb)   05/13/21 70 kg (154 lb 6.4 oz)   04/26/21 71 kg (156 lb 8.4 oz)   06/12/20 71.7 kg (158 lb)   05/29/20 71.7 kg (158 lb)   05/29/20 72.7 kg (160 lb 4.8 oz)   03/03/20 72.6 kg (160 lb)   02/27/20 72.2 kg (159 lb 3.2 oz)   02/06/20 73.9 kg (163 lb)   01/28/20 74.1 kg (163 lb 6.4 oz)      *-*-*-*-*-*-*-*-*-*-*-*-*-*-*-*-*-*-*-*-*-*-*-*-*-*-*-*-*-*-*-*-*-*-*-*-*-*-*-*-*-*-*-*-*-*-*-*-*-*-*-*-*-*-  PHYSICAL EXAM     General Appearance:    Alert, cooperative, no distress, appears stated age   Head, Eyes, ENT:    No obvious abnormality, moist mucous mebranes.   Neck:   Supple, no carotid bruit or JVD   Back:     Symmetric, no curvature.   Lungs:     Respirations unlabored. Clear to auscultation bilaterally,    Chest wall:    No tenderness or deformity   Heart:  Irregularly irregular rhythm, S1 and S2 normal, no murmur, rub  or gallop.   Abdomen:     Soft, non-tender, No obvious masses, or organomegaly   Extremities:   Extremities warm, no cyanosis or edema    Skin:   Mild venostatic changes in lower extremities. Normal skin color, texture, and turgor. No rashes or lesions     *-*-*-*-*-*-*-*-*-*-*-*-*-*-*-*-*-*-*-*-*-*-*-*-*-*-*-*-*-*-*-*-*-*-*-*-*-*-*-*-*-*-*-*-*-*-*-*-*-*-*-*-*-*-  CURRENT MEDICATIONS LIST     Current Outpatient Medications:     acetaminophen (TYLENOL) 325 mg tablet, Take 2 tablets (650 mg total) by mouth every 6 (six) hours as needed for mild pain, Disp: , Rfl: 0    allopurinol (ZYLOPRIM) 100 mg tablet, Take 2 tablets (200 mg total) by mouth daily, Disp: 180 tablet, Rfl: 3    apixaban (ELIQUIS) 2.5 mg, Take 1 tablet (2.5 mg total) by mouth 2 (two) times a day, Disp: 180 tablet, Rfl: 3    Diclofenac  "Sodium  MG 24 hr tablet, Take 1 tablet (100 mg total) by mouth daily as needed (pain) take with food, Disp: 30 tablet, Rfl: 5    digoxin (LANOXIN) 0.125 mg tablet, Take 1 tablet (125 mcg total) by mouth daily, Disp: 45 tablet, Rfl: 3    Imbruvica 140 MG CAPS, Take 1 capsule (140 mg total) by mouth 2 (two) times a day, Disp: 180 capsule, Rfl: 3    nebivolol (Bystolic) 10 mg tablet, Take 1 tablet (10 mg total) by mouth daily, Disp: 90 tablet, Rfl: 3    ofloxacin (FLOXIN) 0.3 % otic solution, Use 4-5 drops in affected ear BID, Disp: 5 mL, Rfl: 0    rosuvastatin (CRESTOR) 5 mg tablet, Take 1 tablet (5 mg total) by mouth daily, Disp: 90 tablet, Rfl: 3    tamsulosin (Flomax) 0.4 mg, Take 1 capsule (0.4 mg total) by mouth daily with dinner, Disp: 90 capsule, Rfl: 3    valsartan (DIOVAN) 40 mg tablet, Take 1 tablet (40 mg total) by mouth daily, Disp: 180 tablet, Rfl: 3    b complex-C-E-zinc (STRESS FORMULA/ZINC) tablet, Take 1 tablet by mouth daily (Patient not taking: Reported on 3/6/2024), Disp: 90 tablet, Rfl: 0    eszopiclone (LUNESTA) 2 mg tablet, Take 1 tablet (2 mg total) by mouth daily at bedtime At 8 PM daily.... (Patient not taking: Reported on 11/15/2023), Disp: 30 tablet, Rfl: 1    Current Facility-Administered Medications:     cyanocobalamin injection 1,000 mcg, 1,000 mcg, Intramuscular, Q30 Days, Dashawn Starkey MD, 1,000 mcg at 08/14/23 1600       ALLERGIES     Allergies   Allergen Reactions    Tetanus Antitoxin Rash     Patient told by mother medication was given as a child    Tetanus Toxoid     Tetanus Toxoids Other (See Comments)     Not specified    Tetanus Antitoxin Rash       *-*-*-*-*-*-*-*-*-*-*-*-*-*-*-*-*-*-*-*-*-*-*-*-*-*-*-*-*-*-*-*-*-*-*-*-*-*-*-*-*-*-*-*-*-*-*-*-*-*-*-*-*-*-  LABORATORY DATA   No results found for: \"NA\"  Potassium   Date Value Ref Range Status   01/23/2024 4.4 3.5 - 5.2 mmol/L Final   07/22/2023 4.0 3.5 - 5.2 mmol/L Final   06/27/2023 4.3 3.5 - 5.3 mmol/L Final   01/13/2023 " 4.4 3.5 - 5.2 mmol/L Final     Chloride   Date Value Ref Range Status   01/23/2024 106 100 - 109 mmol/L Final   07/22/2023 104 100 - 109 mmol/L Final   06/27/2023 110 (H) 96 - 108 mmol/L Final   01/13/2023 107 100 - 109 mmol/L Final     Carbon Dioxide   Date Value Ref Range Status   01/23/2024 29 21 - 31 mmol/L Final   07/22/2023 30 21 - 31 mmol/L Final   01/13/2023 30 23 - 31 mmol/L Final     CO2   Date Value Ref Range Status   06/27/2023 29 21 - 32 mmol/L Final     CO2, i-STAT   Date Value Ref Range Status   10/24/2023 27 21 - 32 mmol/L Final     BUN   Date Value Ref Range Status   01/23/2024 15 7 - 28 mg/dL Final   07/22/2023 15 7 - 28 mg/dL Final   06/27/2023 14 5 - 25 mg/dL Final   01/13/2023 11 7 - 28 mg/dL Final     Creatinine   Date Value Ref Range Status   01/23/2024 1.06 0.53 - 1.30 mg/dL Final   07/22/2023 1.06 0.53 - 1.30 mg/dL Final   06/27/2023 0.99 0.60 - 1.30 mg/dL Final     Comment:     Standardized to IDMS reference method   01/13/2023 0.95 0.53 - 1.30 mg/dL Final     GFR, Calculated   Date Value Ref Range Status   12/30/2020 71 >60 mL/min/1.73m2 Final     Comment:     mL/min per 1.73 square meters                                            Normal Function or Mild Renal    Disease (if clinically at risk):  >or=60  Moderately Decreased:                30-59  Severely Decreased:                  15-29  Renal Failure:                         <15                                            -American GFR: multiply reported GFR by 1.16    Please note that the eGFR is based on the CKD-EPI calculation, and is not intended to be used for drug dosing.                                            Note: Calculated GFR may not be an accurate indicator of renal function if the patient's renal function is not in a steady state.   06/09/2020 67 >60 mL/min/1.73m2 Final     Comment:     mL/min per 1.73 square meters                                            Normal Function or Mild Renal    Disease (if  clinically at risk):  >or=60  Moderately Decreased:                30-59  Severely Decreased:                  15-29  Renal Failure:                         <15                                            -American GFR: multiply reported GFR by 1.16    Please note that the eGFR is based on the CKD-EPI calculation, and is not intended to be used for drug dosing.                                            Note: Calculated GFR may not be an accurate indicator of renal function if the patient's renal function is not in a steady state.   02/12/2020 73 >60 mL/min/1.73m2 Final     Comment:     mL/min per 1.73 square meters                                            Normal Function or Mild Renal    Disease (if clinically at risk):  >or=60  Moderately Decreased:                30-59  Severely Decreased:                  15-29  Renal Failure:                         <15                                            -American GFR: multiply reported GFR by 1.16    Please note that the eGFR is based on the CKD-EPI calculation, and is not intended to be used for drug dosing.                                            Note: Calculated GFR may not be an accurate indicator of renal function if the patient's renal function is not in a steady state.     eGFRcr   Date Value Ref Range Status   01/23/2024 72 >59 Final   07/22/2023 72 >59 Final   01/13/2023 83 >59 Final     eGFR   Date Value Ref Range Status   06/27/2023 73 ml/min/1.73sq m Final     Glucose, i-STAT   Date Value Ref Range Status   10/24/2023 94 65 - 140 mg/dl Final     Calcium   Date Value Ref Range Status   01/23/2024 9.4 8.5 - 10.1 mg/dL Final   07/22/2023 9.3 8.5 - 10.1 mg/dL Final   06/27/2023 9.2 8.3 - 10.1 mg/dL Final   01/13/2023 9.0 8.5 - 10.1 mg/dL Final     AST   Date Value Ref Range Status   01/23/2024 20 <41 U/L Final   07/22/2023 16 <41 U/L Final   06/27/2023 21 5 - 45 U/L Final     Comment:     Specimen collection should occur prior to Sulfasalazine  "administration due to the potential for falsely depressed results.    01/13/2023 16 <41 U/L Final     ALT   Date Value Ref Range Status   01/23/2024 20 <56 U/L Final   07/22/2023 16 <56 U/L Final   06/27/2023 26 12 - 78 U/L Final     Comment:     Specimen collection should occur prior to Sulfasalazine and/or Sulfapyridine administration due to the potential for falsely depressed results.    01/13/2023 21 <56 U/L Final     Alkaline Phosphatase   Date Value Ref Range Status   01/23/2024 77 35 - 120 U/L Final   07/22/2023 80 35 - 120 U/L Final   06/27/2023 82 46 - 116 U/L Final   01/13/2023 90 35 - 120 U/L Final     Magnesium   Date Value Ref Range Status   08/21/2023 2.4 1.6 - 2.6 mg/dL Final   06/27/2023 2.0 1.6 - 2.6 mg/dL Final   01/30/2020 2.4 1.6 - 2.6 mg/dL Final     WBC   Date Value Ref Range Status   08/21/2023 9.98 4.31 - 10.16 Thousand/uL Final   06/27/2023 6.71 4.31 - 10.16 Thousand/uL Final   06/08/2020 5.36 4.31 - 10.16 Thousand/uL Final     Hemoglobin   Date Value Ref Range Status   08/21/2023 16.4 12.0 - 17.0 g/dL Final   06/27/2023 16.3 12.0 - 17.0 g/dL Final   06/08/2020 16.3 12.0 - 17.0 g/dL Final     Hgb, i-STAT   Date Value Ref Range Status   10/24/2023 13.9 12.0 - 17.0 g/dl Final     Platelets   Date Value Ref Range Status   08/21/2023 251 149 - 390 Thousands/uL Final   06/27/2023 236 149 - 390 Thousands/uL Final   06/08/2020 242 149 - 390 Thousands/uL Final     No results found for: \"PT\", \"PTT\", \"INR\"  Digoxin Lvl   Date Value Ref Range Status   06/27/2023 <0.2 (L) 0.8 - 2.0 ng/mL Final   01/30/2020 0.9 0.8 - 2.0 ng/mL Final     TSH   Date Value Ref Range Status   07/18/2022 3.02 0.36 - 3.74 uIU/mL Final     No results found for: \"CHOL\"   Hemoglobin A1C   Date Value Ref Range Status   08/14/2023 5.9 6.5 Final     Gram Stain Result   Date Value Ref Range Status   06/14/2022 1+ Polys  Final   06/14/2022 No bacteria seen  Final     Wound Culture   Date Value Ref Range Status   06/14/2022 2+ Growth " of  Final     Comment:     Mixed Skin Jeannette       *-*-*-*-*-*-*-*-*-*-*-*-*-*-*-*-*-*-*-*-*-*-*-*-*-*-*-*-*-*-*-*-*-*-*-*-*-*-*-*-*-*-*-*-*-*-*-*-*-*-*-*-*-*-  PREVIOUS CARDIOLOGY & RADIOLOGY TEST RESULTS   I have personally reviewed pertinent results of cardiovascular tests noted below.    Results for orders placed during the hospital encounter of 21    Echo complete with contrast if indicated    Narrative  [address] [id number] [phone]    Transthoracic Echocardiogram  2D, M-mode, Doppler, and Color Doppler    Study date:  09-Aug-2021    Patient: EROS ORLANDO  MR number: [id number]  Account number: [id number]  : 1945  Age: 75 years  Gender: Male  Status: Outpatient  Location: [facility] Echo 3  Height: 66 in  Weight: 153.8 lb  BP: 130/ 82 mmHg    Indications: A-fib    Diagnoses: I48.0 - Atrial fibrillation    Sonographer:  Carina Kay RDCS  Referring Physician:  Dashawn Starkey MD  Group:  [facility]  Interpreting Physician:  Hank Monroy D.O.    SUMMARY    LEFT VENTRICLE:  The ventricle was mildly dilated to 5.8 cm internal diastolic diameter.  Systolic function was mildly to moderately reduced by visual assessment. Ejection fraction was estimated to be 40 %. The rhythm was atrial fibrillation.  There was mild diffuse hypokinesis.    LEFT ATRIUM:  The atrium was mildly dilated.    ATRIAL SEPTUM:  The septum bows from left to right, consistent with increased left atrial pressure.    MITRAL VALVE:  There was mild to moderate regurgitation.    TRICUSPID VALVE:  There was mild to moderate regurgitation.    PULMONIC VALVE:  There was trace regurgitation.    SUMMARY MEASUREMENTS  2D measurements:  Unspecified Anatomy:   %FS was 22.2 %.  Ao Diam was 3 cm.  Ao asc was 3.3 cm.  EDV(Teich) was 164.6 ml.  EF(Teich) was 44.2 %.  ESV(Teich) was 91.9 ml.  IVSd was 0.7 cm.  LA Diam was 4.2 cm.  LAAs A2C was 21.7 cm2.  LAAs  A4C was 21.9 cm2.  LAESV A-L A2C was 71.8 ml.  LAESV A-L A4C was 72.3 ml.  LAESV Index (A-L) was 40.6 ml/m2.  LAESV MOD A2C was 68.2 ml.  LAESV MOD A4C was 61.6 ml.  LAESV(A-L) was 72.6 ml.  LAESV(MOD BP) was 65.1 ml.  LAESVInd MOD BP was 36.4 ml/m2.  LALs  A2C was 5.6 cm.  LALs A4C was 5.7 cm.  LVEDV MOD A4C was 122.2 ml.  LVEF MOD A4C was 51.2 %.  LVESV MOD A4C was 59.6 ml.  LVIDd was 5.8 cm.  LVIDs was 4.5 cm.  LVLd A4C was 7.4 cm.  LVLs A4C was 6.6 cm.  LVPWd was 0.9 cm.  RAAs A4C was  13.8 cm2.  RAESV A-L was 36.2 ml.  RAESV MOD was 35.3 ml.  RALs was 4.4 cm.  RVIDd was 3.6 cm.  SV MOD A4C was 62.6 ml.  SV(Teich) was 72.7 ml.  CW measurements:  Unspecified Anatomy:   TR Vmax was 3 m/s.  TR maxPG was 35.5 mmHg.  MM measurements:  Unspecified Anatomy:   TAPSE was 2.3 cm.    HISTORY: PRIOR HISTORY: HTN; AFIB; Pulmonary HTN; MR; PVD; HLD; CP; Suspected tachycardia associated CM    PROCEDURE: The study was performed in the [facility] Echo 3. This was a routine study. The transthoracic approach was used. The study included complete 2D imaging, M-mode, complete spectral Doppler, and color Doppler. The heart rate was 69 bpm, at  the start of the study. Images were obtained from the parasternal, apical, subcostal, and suprasternal notch acoustic windows. Echocardiographic views were limited due to lung interference. Image quality was adequate.    LEFT VENTRICLE: The ventricle was mildly dilated to 5.8 cm internal diastolic diameter. Systolic function was mildly to moderately reduced by visual assessment. Ejection fraction was estimated to be 40 %. The rhythm was atrial  fibrillation. There was mild diffuse hypokinesis. Wall thickness was normal.    RIGHT VENTRICLE: The size was normal. Systolic function was normal. Wall thickness was normal.    LEFT ATRIUM: The atrium was mildly dilated.    ATRIAL SEPTUM: The septum bows from left to right, consistent with increased left atrial pressure.    RIGHT ATRIUM: Size was  normal.    MITRAL VALVE: Valve structure was normal. There was normal leaflet separation. DOPPLER: The transmitral velocity was within the normal range. There was no evidence for stenosis. There was mild to moderate regurgitation.    AORTIC VALVE: The valve was trileaflet. Leaflets exhibited normal thickness, normal cuspal separation, and sclerosis. DOPPLER: Transaortic velocity was within the normal range. There was no evidence for stenosis. There was no  regurgitation.    TRICUSPID VALVE: The valve structure was normal. There was normal leaflet separation. DOPPLER: The transtricuspid velocity was within the normal range. There was no evidence for stenosis. There was mild to moderate regurgitation. Estimated  peak PA pressure was 39- 44 mmHg. The findings suggest mild pulmonary hypertension.    PULMONIC VALVE: Leaflets exhibited normal thickness, no calcification, and normal cuspal separation. DOPPLER: The transpulmonic velocity was within the normal range. There was no evidence for stenosis. There was trace regurgitation.    PERICARDIUM: There was no pericardial effusion. The pericardium was normal in appearance.    AORTA: The root exhibited normal size. The ascending aorta was normal in size.    SYSTEM MEASUREMENT TABLES    2D  %FS: 22.2 %  Ao Diam: 3 cm  Ao asc: 3.3 cm  EDV(Teich): 164.6 ml  EF(Teich): 44.2 %  ESV(Teich): 91.9 ml  IVSd: 0.7 cm  LA Diam: 4.2 cm  LAAs A2C: 21.7 cm2  LAAs A4C: 21.9 cm2  LAESV A-L A2C: 71.8 ml  LAESV A-L A4C: 72.3 ml  LAESV Index (A-L): 40.6 ml/m2  LAESV MOD A2C: 68.2 ml  LAESV MOD A4C: 61.6 ml  LAESV(A-L): 72.6 ml  LAESV(MOD BP): 65.1 ml  LAESVInd MOD BP: 36.4 ml/m2  LALs A2C: 5.6 cm  LALs A4C: 5.7 cm  LVEDV MOD A4C: 122.2 ml  LVEF MOD A4C: 51.2 %  LVESV MOD A4C: 59.6 ml  LVIDd: 5.8 cm  LVIDs: 4.5 cm  LVLd A4C: 7.4 cm  LVLs A4C: 6.6 cm  LVPWd: 0.9 cm  RAAs A4C: 13.8 cm2  RAESV A-L: 36.2 ml  RAESV MOD: 35.3 ml  RALs: 4.4 cm  RVIDd: 3.6 cm  SV MOD A4C: 62.6 ml  SV(Teich): 72.7  ml    CW  TR Vmax: 3 m/s  TR maxP.5 mmHg    MM  TAPSE: 2.3 cm    [facility]    Prepared and electronically signed by    Hank Monroy D.O.  Signed 09-Aug-2021 14:13:30    No results found for this or any previous visit.    No results found for this or any previous visit.    No results found for this or any previous visit.    MRI brain w wo contrast  Narrative: MRI BRAIN WITH AND WITHOUT CONTRAST    INDICATION: S09.90XD: Unspecified injury of head, subsequent encounter  R42: Dizziness and giddiness.    COMPARISON: 10/24/2023    TECHNIQUE:  Multiplanar, multisequence imaging of the brain was performed before and after gadolinium administration.    IV Contrast:  7 mL of Gadobutrol injection (SINGLE-DOSE)    IMAGE QUALITY:   Diagnostic.    FINDINGS:    BRAIN PARENCHYMA:  There is no discrete  mass effect or midline shift. There is no intracranial hemorrhage.  Normal posterior fossa.  Diffusion imaging is unremarkable.    Small scattered hyperintensities on T2/FLAIR imaging are noted in the periventricular and subcortical white matter demonstrating an appearance that is statistically most likely to represent mild microangiopathic change.    Homogeneously enhancing 1 cm mass in the right posterior medial superior cerebellum near the torcula projecting inferiorly consistent with dural based meningioma.  VENTRICLES:  Normal for the patient's age.    SELLA AND PITUITARY GLAND:  Normal.    ORBITS:  Normal.    PARANASAL SINUSES:  Normal.    VASCULATURE:  Evaluation of the major intracranial vasculature demonstrates appropriate flow voids.    CALVARIUM AND SKULL BASE:  Normal.    EXTRACRANIAL SOFT TISSUES:  Normal.  Impression: White matter changes suggestive of chronic microangiopathy.  No acute intracranial pathology.  1 cm right posterior medial cerebellar tentorial based meningioma as seen on CT.    Workstation performed: [id number]         *-*-*-*-*-*-*-*-*-*-*-*-*-*-*-*-*-*-*-*-*-*-*-*-*-*-*-*-*-*-*-*-*-*-*-*-*-*-*-*-*-*-*-*-*-*-*-*-*-*-*-*-*-*-  SIGNATURES:   @SIG@   Mely Sanchez MD; JEWELLA    *-*-*-*-*-*-*-*-*-*-*-*-*-*-*-*-*-*-*-*-*-*-*-*-*-*-*-*-*-*-*-*-*-*-*-*-*-*-*-*-*-*-*-*-*-*-*-*-*-*-*-*-*-*-  PAST MEDICAL HISTORY:  Past Medical History:   Diagnosis Date    Atrial fibrillation (HCC)     CLL (chronic lymphocytic leukemia) (HCC)     HLD (hyperlipidemia)     HTN (hypertension)     Hypertension     Leukemia (HCC)     Meningioma (HCC)     Osteoarthritis 9/26/2012    PAD (peripheral artery disease) (HCC)     PAST SURGICAL HISTORY:  Past Surgical History:   Procedure Laterality Date    CARDIOVERSION ([place] ONLY)  6/12/2020         NO PAST SURGERIES      TN ECHO TRANSESOPHAG R-T 2D W/PRB IMG ACQUISJ I&R  6/12/2020              FAMILY HISTORY:  Family History   Problem Relation Age of Onset    Diabetes Mother     Heart disease Father     SOCIAL HISTORY:  Social History     Tobacco Use   Smoking Status Never   Smokeless Tobacco Never   Tobacco Comments    No passive smoke exposure      Social History     Substance and Sexual Activity   Alcohol Use Yes     Social History     Substance and Sexual Activity   Drug Use No    [unfilled]     *-*-*-*-*-*-*-*-*-*-*-*-*-*-*-*-*-*-*-*-*-*-*-*-*-*-*-*-*-*-*-*-*-*-*-*-*-*-*-*-*-*-*-*-*-*-*-*-*-*-*-*-*-*  ALLERGIES:  Allergies   Allergen Reactions    Tetanus Antitoxin Rash     Patient told by mother medication was given as a child    Tetanus Toxoid     Tetanus Toxoids Other (See Comments)     Not specified    Tetanus Antitoxin Rash    CURRENT SCHEDULED MEDICATIONS:    Current Outpatient Medications:     acetaminophen (TYLENOL) 325 mg tablet, Take 2 tablets (650 mg total) by mouth every 6 (six) hours as needed for mild pain, Disp: , Rfl: 0    allopurinol (ZYLOPRIM) 100 mg tablet, Take 2 tablets (200 mg total) by mouth daily, Disp: 180 tablet, Rfl: 3    apixaban (ELIQUIS) 2.5 mg, Take 1 tablet (2.5 mg total) by mouth 2  (two) times a day, Disp: 180 tablet, Rfl: 3    Diclofenac Sodium  MG 24 hr tablet, Take 1 tablet (100 mg total) by mouth daily as needed (pain) take with food, Disp: 30 tablet, Rfl: 5    digoxin (LANOXIN) 0.125 mg tablet, Take 1 tablet (125 mcg total) by mouth daily, Disp: 45 tablet, Rfl: 3    Imbruvica 140 MG CAPS, Take 1 capsule (140 mg total) by mouth 2 (two) times a day, Disp: 180 capsule, Rfl: 3    nebivolol (Bystolic) 10 mg tablet, Take 1 tablet (10 mg total) by mouth daily, Disp: 90 tablet, Rfl: 3    ofloxacin (FLOXIN) 0.3 % otic solution, Use 4-5 drops in affected ear BID, Disp: 5 mL, Rfl: 0    rosuvastatin (CRESTOR) 5 mg tablet, Take 1 tablet (5 mg total) by mouth daily, Disp: 90 tablet, Rfl: 3    tamsulosin (Flomax) 0.4 mg, Take 1 capsule (0.4 mg total) by mouth daily with dinner, Disp: 90 capsule, Rfl: 3    valsartan (DIOVAN) 40 mg tablet, Take 1 tablet (40 mg total) by mouth daily, Disp: 180 tablet, Rfl: 3    b complex-C-E-zinc (STRESS FORMULA/ZINC) tablet, Take 1 tablet by mouth daily (Patient not taking: Reported on 3/6/2024), Disp: 90 tablet, Rfl: 0    eszopiclone (LUNESTA) 2 mg tablet, Take 1 tablet (2 mg total) by mouth daily at bedtime At 8 PM daily.... (Patient not taking: Reported on 11/15/2023), Disp: 30 tablet, Rfl: 1    Current Facility-Administered Medications:     cyanocobalamin injection 1,000 mcg, 1,000 mcg, Intramuscular, Q30 Days, Dashawn Starkey MD, 1,000 mcg at 08/14/23 1600     *-*-*-*-*-*-*-*-*-*-*-*-*-*-*-*-*-*-*-*-*-*-*-*-*-*-*-*-*-*-*-*-*-*-*-*-*-*-*-*-*-*-*-*-*-*-*-*-*-*-*-*-*-*

## 2024-03-13 ENCOUNTER — TELEPHONE (OUTPATIENT)
Dept: CARDIOLOGY CLINIC | Facility: CLINIC | Age: 79
End: 2024-03-13

## 2024-03-13 NOTE — TELEPHONE ENCOUNTER
Received fax from [email or web address]  Requesting confirmation for Eliquis dosage and directions     Completed form and faxed back to [phone]

## 2024-03-15 ENCOUNTER — TELEPHONE (OUTPATIENT)
Dept: CARDIOLOGY CLINIC | Facility: CLINIC | Age: 79
End: 2024-03-15

## 2024-03-15 DIAGNOSIS — D32.9 MENINGIOMA (HCC): ICD-10-CM

## 2024-03-15 NOTE — TELEPHONE ENCOUNTER
Refilled rx at 5mg BID by Dr Sanchez  Pharm called with verbal instructions @[phone]   Case # [id number]

## 2024-03-15 NOTE — TELEPHONE ENCOUNTER
Pc from [place] [facility] on doseage of Eliquis....The received request for 2.5 mg and are making sure not to be the 5 mg size due to age and weight.  Can see prior rxs were 5 mg please review and advise and I will call pharmacy with response.  Thank you.

## 2024-06-03 ENCOUNTER — TELEPHONE (OUTPATIENT)
Age: 79
End: 2024-06-03

## 2024-06-03 NOTE — TELEPHONE ENCOUNTER
Guerrero no longer working Patient said you would call I something stronger. Patient asked for return call.

## 2024-06-05 ENCOUNTER — TELEPHONE (OUTPATIENT)
Dept: FAMILY MEDICINE CLINIC | Facility: CLINIC | Age: 79
End: 2024-06-05

## 2024-06-05 ENCOUNTER — OFFICE VISIT (OUTPATIENT)
Dept: FAMILY MEDICINE CLINIC | Facility: CLINIC | Age: 79
End: 2024-06-05
Payer: MEDICARE

## 2024-06-05 VITALS
RESPIRATION RATE: 14 BRPM | WEIGHT: 156 LBS | TEMPERATURE: 98.1 F | HEIGHT: 66 IN | DIASTOLIC BLOOD PRESSURE: 70 MMHG | BODY MASS INDEX: 25.07 KG/M2 | OXYGEN SATURATION: 98 % | SYSTOLIC BLOOD PRESSURE: 116 MMHG | HEART RATE: 76 BPM

## 2024-06-05 DIAGNOSIS — I48.19 PERSISTENT ATRIAL FIBRILLATION (HCC): ICD-10-CM

## 2024-06-05 DIAGNOSIS — E11.69 HYPERLIPIDEMIA ASSOCIATED WITH TYPE 2 DIABETES MELLITUS  (HCC): ICD-10-CM

## 2024-06-05 DIAGNOSIS — I73.9 PAD (PERIPHERAL ARTERY DISEASE) (HCC): Primary | ICD-10-CM

## 2024-06-05 DIAGNOSIS — M25.431 SWELLING OF RIGHT WRIST: ICD-10-CM

## 2024-06-05 DIAGNOSIS — I27.20 MILD PULMONARY HYPERTENSION (HCC): ICD-10-CM

## 2024-06-05 DIAGNOSIS — D32.9 MENINGIOMA (HCC): ICD-10-CM

## 2024-06-05 DIAGNOSIS — M25.551 ACUTE PAIN OF RIGHT HIP: ICD-10-CM

## 2024-06-05 DIAGNOSIS — M54.16 LUMBAR RADICULOPATHY: ICD-10-CM

## 2024-06-05 DIAGNOSIS — E78.5 HYPERLIPIDEMIA ASSOCIATED WITH TYPE 2 DIABETES MELLITUS  (HCC): ICD-10-CM

## 2024-06-05 DIAGNOSIS — C91.10 CHRONIC LYMPHOCYTIC LEUKEMIA (HCC): ICD-10-CM

## 2024-06-05 LAB — SL AMB POCT HEMOGLOBIN AIC: 5.8 (ref ?–6.5)

## 2024-06-05 PROCEDURE — 83036 HEMOGLOBIN GLYCOSYLATED A1C: CPT | Performed by: INTERNAL MEDICINE

## 2024-06-05 PROCEDURE — 99214 OFFICE O/P EST MOD 30 MIN: CPT | Performed by: INTERNAL MEDICINE

## 2024-06-05 PROCEDURE — 96372 THER/PROPH/DIAG INJ SC/IM: CPT | Performed by: INTERNAL MEDICINE

## 2024-06-05 RX ORDER — TRAMADOL HYDROCHLORIDE 50 MG/1
50 TABLET ORAL DAILY PRN
Qty: 30 TABLET | Refills: 1 | Status: SHIPPED | OUTPATIENT
Start: 2024-06-05

## 2024-06-05 RX ORDER — KETOROLAC TROMETHAMINE 30 MG/ML
60 INJECTION, SOLUTION INTRAMUSCULAR; INTRAVENOUS ONCE
Status: COMPLETED | OUTPATIENT
Start: 2024-06-05 | End: 2024-06-05

## 2024-06-05 RX ADMIN — KETOROLAC TROMETHAMINE 60 MG: 30 INJECTION, SOLUTION INTRAMUSCULAR; INTRAVENOUS at 11:53

## 2024-06-05 NOTE — PROGRESS NOTES
Ambulatory Visit  Name: Jose Cruz Alejandro      : 1945      MRN: [id number]  Encounter Provider: Dashawn Starkey MD  Encounter Date: 2024   Encounter department: [facility]    Assessment & Plan   1. PAD (peripheral artery disease) (HCC)  Comments:  R U Ext  ASAP; Arterial doppler study  RTC in 1 mo w Blood work  Orders:  -     VAS upper limb arterial duplex, unilateral/limited, graft; Future; Expected date: 2024  -     ketorolac (TORADOL) 60 mg/2 mL IM injection 60 mg  2. Swelling of right wrist  -     VAS upper limb arterial duplex, unilateral/limited, graft; Future; Expected date: 2024  -     XR wrist 3+ vw right; Future; Expected date: 2024  -     XR hand 3+ vw right; Future; Expected date: 2024  -     ketorolac (TORADOL) 60 mg/2 mL IM injection 60 mg  3. Lumbar radiculopathy  -     XR hip/pelv 2-3 vws right if performed; Future; Expected date: 2024  -     XR spine lumbar 2 or 3 views injury; Future; Expected date: 2024  -     ketorolac (TORADOL) 60 mg/2 mL IM injection 60 mg  -     traMADol (Ultram) 50 mg tablet; Take 1 tablet (50 mg total) by mouth daily as needed for moderate pain  4. Acute pain of right hip  -     XR hip/pelv 2-3 vws right if performed; Future; Expected date: 2024  -     XR spine lumbar 2 or 3 views injury; Future; Expected date: 2024  -     ketorolac (TORADOL) 60 mg/2 mL IM injection 60 mg  -     traMADol (Ultram) 50 mg tablet; Take 1 tablet (50 mg total) by mouth daily as needed for moderate pain  5. Meningioma (HCC)  Comments:  stable  repeat CT Head in 6 mos  6. BMI 25.0-25.9,adult  7. Mild pulmonary hypertension (HCC)  Comments:  stable  continue same  8. Hyperlipidemia associated with type 2 diabetes mellitus  (HCC)  Comments:  continue same  RTC in 1 mo w Blood work  Orders:  -     POCT hemoglobin A1c  -     Digoxin level; Future  -     UA (URINE) with reflex to Scope; Future  -     Vitamin  B12; Future  -     Vitamin D 25 hydroxy; Future; Expected date: 06/05/2024  -     Magnesium; Future  -     TSH, 3rd generation; Future; Expected date: 06/05/2024  -     T4, free; Future; Expected date: 06/05/2024  -     Ferritin; Future  -     Lipid panel; Future; Expected date: 06/12/2024  -     CBC and differential; Future; Expected date: 06/12/2024  -     Comprehensive metabolic panel; Future; Expected date: 12/05/2024  9. Persistent atrial fibrillation (HCC)  Comments:  stable  continue same  FU w cardiology  RTC in 1-2 mos w Blood work  Orders:  -     Digoxin level; Future  -     UA (URINE) with reflex to Scope; Future  -     Vitamin B12; Future  -     Vitamin D 25 hydroxy; Future; Expected date: 06/05/2024  -     Magnesium; Future  -     TSH, 3rd generation; Future; Expected date: 06/05/2024  -     T4, free; Future; Expected date: 06/05/2024  -     Ferritin; Future  -     Lipid panel; Future; Expected date: 06/12/2024  -     CBC and differential; Future; Expected date: 06/12/2024  -     Comprehensive metabolic panel; Future; Expected date: 12/05/2024  10. Chronic lymphocytic leukemia (HCC)  Comments:  In remission  FU w Hematology  Consider Vascular surgery ASAP  RTC in 1 mo w Blood work         History of Present Illness     78 Y O Man is here for Regular check Up, and has increased low back pain and swelling right wrist,....        Review of Systems   Constitutional:  Negative for chills, fatigue and fever.   HENT:  Negative for congestion, facial swelling, sore throat, trouble swallowing and voice change.    Eyes:  Negative for pain, discharge and visual disturbance.   Respiratory:  Negative for cough, shortness of breath and wheezing.    Cardiovascular:  Negative for chest pain, palpitations and leg swelling.   Gastrointestinal:  Negative for abdominal pain, blood in stool, constipation, diarrhea and nausea.   Endocrine: Negative for polydipsia, polyphagia and polyuria.   Genitourinary:  Negative for  "difficulty urinating, hematuria and urgency.   Musculoskeletal:  Positive for arthralgias and joint swelling. Negative for myalgias.   Skin:  Negative for rash.   Neurological:  Positive for numbness. Negative for dizziness, tremors, weakness and headaches.   Hematological:  Negative for adenopathy. Does not bruise/bleed easily.   Psychiatric/Behavioral:  Negative for dysphoric mood, sleep disturbance and suicidal ideas.        Objective     /70 (BP Location: Left arm, Patient Position: Sitting, Cuff Size: Standard)   Pulse 76   Temp 98.1 °F (36.7 °C) (Tympanic)   Resp 14   Ht 5' 6\" (1.676 m)   Wt 70.8 kg (156 lb)   SpO2 98%   BMI 25.18 kg/m²     Physical Exam  Vitals and nursing note reviewed.   Constitutional:       General: He is not in acute distress.     Appearance: He is well-developed.   HENT:      Head: Normocephalic and atraumatic.      Right Ear: External ear normal.      Left Ear: External ear normal.   Eyes:      Extraocular Movements: EOM normal.      Conjunctiva/sclera: Conjunctivae normal.      Pupils: Pupils are equal, round, and reactive to light.   Neck:      Thyroid: No thyromegaly.      Trachea: No tracheal deviation.   Cardiovascular:      Rate and Rhythm: Normal rate and regular rhythm.      Heart sounds: Normal heart sounds. No murmur heard.     No friction rub.   Pulmonary:      Effort: Pulmonary effort is normal. No respiratory distress.      Breath sounds: Normal breath sounds. No wheezing.   Abdominal:      General: Bowel sounds are normal. There is no distension.      Palpations: Abdomen is soft.      Tenderness: There is no abdominal tenderness.   Musculoskeletal:         General: Swelling present. No deformity or edema. Normal range of motion.      Cervical back: Neck supple.      Comments: Right wrist area??Cyst Over the radial Artery ???   Skin:     General: Skin is warm and dry.      Capillary Refill: Capillary refill takes less than 2 seconds.      Findings: No erythema " or rash.   Neurological:      Mental Status: He is alert and oriented to person, place, and time.      Cranial Nerves: No cranial nerve deficit.      Coordination: Coordination normal.      Deep Tendon Reflexes: Reflexes normal.   Psychiatric:         Mood and Affect: Mood and affect and mood normal.         Behavior: Behavior normal.       Administrative Statements

## 2024-06-05 NOTE — TELEPHONE ENCOUNTER
Spoke with the patient's wife regarding VAS duplex appointment.  Appointment scheduled 6/7/24 @ 2:30 pm.

## 2024-06-07 ENCOUNTER — HOSPITAL ENCOUNTER (OUTPATIENT)
Dept: NON INVASIVE DIAGNOSTICS | Facility: CLINIC | Age: 79
Discharge: HOME | End: 2024-06-07
Payer: MEDICARE

## 2024-06-07 DIAGNOSIS — I73.9 PAD (PERIPHERAL ARTERY DISEASE) (HCC): ICD-10-CM

## 2024-06-07 DIAGNOSIS — M25.431 SWELLING OF RIGHT WRIST: ICD-10-CM

## 2024-06-07 PROCEDURE — 93931 UPPER EXTREMITY STUDY: CPT | Performed by: INTERNAL MEDICINE

## 2024-06-07 PROCEDURE — 93931 UPPER EXTREMITY STUDY: CPT

## 2024-07-05 ENCOUNTER — APPOINTMENT (EMERGENCY)
Dept: RADIOLOGY | Facility: HOSPITAL | Age: 79
End: 2024-07-05
Payer: MEDICARE

## 2024-07-05 ENCOUNTER — HOSPITAL ENCOUNTER (EMERGENCY)
Facility: HOSPITAL | Age: 79
Discharge: HOME | End: 2024-07-05
Attending: EMERGENCY MEDICINE
Payer: MEDICARE

## 2024-07-05 ENCOUNTER — TELEPHONE (OUTPATIENT)
Age: 79
End: 2024-07-05

## 2024-07-05 VITALS
OXYGEN SATURATION: 95 % | BODY MASS INDEX: 27.79 KG/M2 | RESPIRATION RATE: 17 BRPM | HEART RATE: 100 BPM | DIASTOLIC BLOOD PRESSURE: 86 MMHG | SYSTOLIC BLOOD PRESSURE: 170 MMHG | TEMPERATURE: 97.9 F | WEIGHT: 172.18 LBS

## 2024-07-05 DIAGNOSIS — M25.431 SWELLING OF RIGHT WRIST: Primary | ICD-10-CM

## 2024-07-05 DIAGNOSIS — M25.531 RIGHT WRIST PAIN: Primary | ICD-10-CM

## 2024-07-05 PROCEDURE — 73110 X-RAY EXAM OF WRIST: CPT

## 2024-07-05 PROCEDURE — 99284 EMERGENCY DEPT VISIT MOD MDM: CPT | Performed by: EMERGENCY MEDICINE

## 2024-07-05 PROCEDURE — 76882 US LMTD JT/FCL EVL NVASC XTR: CPT | Performed by: EMERGENCY MEDICINE

## 2024-07-05 RX ORDER — KETOROLAC TROMETHAMINE 30 MG/ML
15 INJECTION, SOLUTION INTRAMUSCULAR; INTRAVENOUS ONCE
Status: COMPLETED | OUTPATIENT
Start: 2024-07-05 | End: 2024-07-05

## 2024-07-05 RX ORDER — ACETAMINOPHEN 325 MG/1
650 TABLET ORAL ONCE
Status: DISCONTINUED | OUTPATIENT
Start: 2024-07-05 | End: 2024-07-05 | Stop reason: HOSPADM

## 2024-07-05 RX ORDER — DIFLUNISAL 500 MG/1
500 TABLET, FILM COATED ORAL 2 TIMES DAILY
Qty: 60 TABLET | Refills: 0 | Status: SHIPPED | OUTPATIENT
Start: 2024-07-05

## 2024-07-05 RX ADMIN — KETOROLAC TROMETHAMINE 15 MG: 30 INJECTION, SOLUTION INTRAMUSCULAR; INTRAVENOUS at 08:08

## 2024-07-05 NOTE — ED ATTENDING ATTESTATION
7/5/2024  IBrisa DO, saw and evaluated the patient. I have discussed the patient with the resident/non-physician practitioner and agree with the resident's/non-physician practitioner's findings, Plan of Care, and MDM as documented in the resident's/non-physician practitioner's note, except where noted. All available labs and Radiology studies were reviewed.  I was present for key portions of any procedure(s) performed by the resident/non-physician practitioner and I was immediately available to provide assistance.       At this point I agree with the current assessment done in the Emergency Department.  I have conducted an independent evaluation of this patient a history and physical is as follows:        A 78-year-old male with past medical history of A-fib, CLL, hyperlipidemia, hypertension and PAD; presents with right wrist pain and swelling that has gotten progressively worse for the past three days.  Pt states he was hanging wall paper prior to onset of symptoms.  Pt otherwise denies fever, chills, chest pain, SOB, abd pain, N/V/D, peripheral edema and rashes.   Pt does report ongoing right wrist pain and swelling.    Physical Exam  General Appearance: alert and oriented, nad, non toxic appearing  Skin:  Warm, dry, intact  HEENT: atraumatic, normocephalic  Neck: Supple, trachea midline  Cardiac: RRR; no murmurs, rub, gallops  Pulmonary: lungs CTAB; no wheezes, rales, rhonchi  Extremities:  Right wrist swollen, most prominent of the dorsal aspect.  No associated erythema or warmth.  Mild tenderness to the right wrist.  Decreased ROM to the right wrist due to pain.  Remainder of RUE nontender with full ROM.  no pedal edema, 2+ pulses; no calf tenderness, no clubbing, no cyanosis  Neuro:  no focal motor or sensory deficits, CN 2-12 grossly intact  Psych:  Normal mood and affect, normal judgement and insight    Assessment and Plan:  Right wrist pain, starting after hanging wall paper three days ago.   No findings to suggest septic arthritis.  Will obtain XR to rule out underlying osseus abnormality.  Will treat symptomatically.       ED Course         Critical Care Time  Procedures

## 2024-07-05 NOTE — DISCHARGE INSTRUCTIONS
Please schedule an appointment with your PCP and with the orthopedic surgeon     Wear the wrist splint during the day, avoid repetitive use of the wrist. You can take tylenol and/or motrin for pain as needed. Follow dosing instructions carefully, do not exceed the daily recommended dose. It's important to rest the wrist and arm.     Call your doctor or return to the ER if you experience worsening pain, fevers/chills, or any other concerning symptoms

## 2024-07-05 NOTE — ED PROVIDER NOTES
History  Chief Complaint   Patient presents with    Wrist Pain     Patient states he worked a 12 hour shift 2 days ago, has a history of a torn rotator cuff with no surgery. States he has pain in his right shoulder and wrist that makes it unmovable.      79 yo M w/PMHx as listed below, presents for evaluation of R wrist pain/swelling. 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 The next day, he began experiencing severe R shoulder and wrist pain. Pt has known rotator cuff injury, he decided not to pursue operative repair. Pt states the discomfort in his R shoulder has since resolved, but his R wrist is swollen and tender so he came in for evaluation. Swelling/pain is similar to previous episodes of overuse        Prior to Admission Medications   Prescriptions Last Dose Informant Patient Reported? Taking?   Imbruvica 140 MG CAPS   No No   Sig: Take 1 capsule (140 mg total) by mouth 2 (two) times a day   acetaminophen (TYLENOL) 325 mg tablet   No No   Sig: Take 2 tablets (650 mg total) by mouth every 6 (six) hours as needed for mild pain   allopurinol (ZYLOPRIM) 100 mg tablet   No No   Sig: Take 2 tablets (200 mg total) by mouth daily   apixaban (ELIQUIS) 5 mg   No No   Sig: Take 1 tablet (5 mg total) by mouth 2 (two) times a day   digoxin (LANOXIN) 0.125 mg tablet   No No   Sig: Take 1 tablet (125 mcg total) by mouth daily   nebivolol (Bystolic) 10 mg tablet   No No   Sig: Take 1 tablet (10 mg total) by mouth daily   rosuvastatin (CRESTOR) 5 mg tablet   No No   Sig: Take 1 tablet (5 mg total) by mouth daily   tamsulosin (Flomax) 0.4 mg   No No   Sig: Take 1 capsule (0.4 mg total) by mouth daily with dinner   traMADol (Ultram) 50 mg tablet   No No   Sig: Take 1 tablet (50 mg total) by mouth daily as needed for moderate pain   valsartan (DIOVAN) 40 mg tablet   No No   Sig: Take 1 tablet (40 mg total) by mouth daily      Facility-Administered Medications Last Administration Doses Remaining   cyanocobalamin  injection 1,000 mcg 8/14/2023  4:00 PM           Past Medical History:   Diagnosis Date    Atrial fibrillation (HCC)     CLL (chronic lymphocytic leukemia) (HCC)     HLD (hyperlipidemia)     HTN (hypertension)     Hypertension     Leukemia (HCC)     Meningioma (HCC)     Osteoarthritis 9/26/2012    PAD (peripheral artery disease) (HCC)        Past Surgical History:   Procedure Laterality Date    CARDIOVERSION ([place] ONLY)  6/12/2020         NO PAST SURGERIES      [place] ECHO TRANSESOPHAG R-T 2D W/PRB IMG ACQUISJ I&R  6/12/2020            Family History   Problem Relation Age of Onset    Diabetes Mother     Heart disease Father      I have reviewed and agree with the history as documented.    E-Cigarette/Vaping    E-Cigarette Use Never User      E-Cigarette/Vaping Substances    Nicotine No     THC No     CBD No     Flavoring No     Other No     Unknown No      Social History     Tobacco Use    Smoking status: Never    Smokeless tobacco: Never    Tobacco comments:     No passive smoke exposure   Vaping Use    Vaping status: Never Used   Substance Use Topics    Alcohol use: Yes    Drug use: No        Review of Systems   Musculoskeletal:  Positive for arthralgias.   All other systems reviewed and are negative.      Physical Exam  ED Triage Vitals   Temperature Pulse Respirations Blood Pressure SpO2   07/05/24 0743 07/05/24 0743 07/05/24 0743 07/05/24 0743 07/05/24 0743   97.9 °F (36.6 °C) 100 17 170/86 95 %      Temp Source Heart Rate Source Patient Position - Orthostatic VS BP Location FiO2 (%)   07/05/24 0743 07/05/24 0743 07/05/24 0743 07/05/24 0743 --   Oral Monitor Lying Left arm       Pain Score       07/05/24 0807       7             Orthostatic Vital Signs  Vitals:    07/05/24 0743   BP: 170/86   Pulse: 100   Patient Position - Orthostatic VS: Lying       Physical Exam  Vitals reviewed.   Constitutional:       Appearance: Normal appearance. He is normal weight.   HENT:      Head: Normocephalic and atraumatic.       Right Ear: External ear normal.      Left Ear: External ear normal.      Nose: Nose normal.      Mouth/Throat:      Mouth: Mucous membranes are moist.      Pharynx: Oropharynx is clear.   Eyes:      Extraocular Movements: Extraocular movements intact.      Conjunctiva/sclera: Conjunctivae normal.      Pupils: Pupils are equal, round, and reactive to light.   Cardiovascular:      Rate and Rhythm: Normal rate and regular rhythm.      Pulses: Normal pulses.      Heart sounds: Normal heart sounds.   Pulmonary:      Effort: Pulmonary effort is normal.      Breath sounds: Normal breath sounds.   Abdominal:      Palpations: Abdomen is soft.      Tenderness: There is no abdominal tenderness.   Musculoskeletal:         General: Swelling and tenderness present.      Right shoulder: No swelling or deformity.      Right wrist: Swelling and tenderness present.      Cervical back: Normal range of motion and neck supple.   Neurological:      Mental Status: He is alert.         Fluid collection above joint space     ED Medications  Medications   acetaminophen (TYLENOL) tablet 650 mg (650 mg Oral Not Given 7/5/24 0807)   ketorolac (TORADOL) injection 15 mg (15 mg Intramuscular Given 7/5/24 0808)       Diagnostic Studies  Results Reviewed       None                   XR wrist 3+ views RIGHT   Final Result by Kyler Waterman MD (07/05 0932)      No acute osseous abnormality.      Chronic appearing distal radial fracture displaced ulnar styloid process fracture. Finding suggestive of dorsal intercalated segment instability.      Multiple carpal erosions may represent synovitis.         Computerized Assisted Algorithm (CAA) may have been used to analyze all applicable images.            Workstation performed: [id number]               Procedures  POC MSK/Soft Tissue US    Date/Time: 7/5/2024 7:30 AM    Performed by: Naveen Cano MD  Authorized by: Naveen Cano MD    Patient location:  ED  Performed by:  Resident  Other Assisting  Provider: No    Procedure:     Performed: soft tissue ultrasound    Procedure details:     Exam Type:  Diagnostic    Longitudinal view:  Obtained    Image availability:  Still images obtained  Soft tissue ultrasound:     Soft tissue indications: swelling      Anatomic location:  Upper extremity    Soft tissue findings: subcutaneous collection (comment)          ED Course                             SBIRT 20yo+      Flowsheet Row Most Recent Value   Initial Alcohol Screen: US AUDIT-C     1. How often do you have a drink containing alcohol? 3 Filed at: 07/05/2024 0748   2. How many drinks containing alcohol do you have on a typical day you are drinking?  1 Filed at: 07/05/2024 0748   3a. Male UNDER 65: How often do you have five or more drinks on one occasion? 0 Filed at: 07/05/2024 0748   3b. FEMALE Any Age, or MALE 65+: How often do you have 4 or more drinks on one occassion? 0 Filed at: 07/05/2024 0748   Audit-C Score 4 Filed at: 07/05/2024 0748   VELASQUEZ: How many times in the past year have you...    Used an illegal drug or used a prescription medication for non-medical reasons? Never Filed at: 07/05/2024 0748                  Medical Decision Making  Will order XR to assess for poss fx, tylenol/Toradol for pain.     No obvious fx on XR, US with SQ fluid collection. Significant relief of pain after Toradol/tylenol     Care plan discussed with pt, he understands and is agreeable. Pt will wear wrist brace until seen by rashido and will refrain from repetitive use of his R arm. Return precautions dicussed, all questions answered. Pt stable for discharge.     Amount and/or Complexity of Data Reviewed  Radiology: ordered.    Risk  OTC drugs.  Prescription drug management.          Disposition  Final diagnoses:   Right wrist pain     Time reflects when diagnosis was documented in both MDM as applicable and the Disposition within this note       Time User Action Codes Description Comment    7/5/2024  9:15 AM Naveen Cano  Add [M25.531] Right wrist pain           ED Disposition       ED Disposition   Discharge    Condition   Stable    Date/Time   Fri Jul 5, 2024 0912    Comment   Jose Cruz Alejandro discharge to home/self care.                   Follow-up Information       Follow up With Specialties Details Why Contact Info Additional Information    [facility] [place] [facility] Schedule an appointment as soon as possible for a visit   [address]  [address]  [facility] [address] [phone] [facility] [place], [facility] [address], [address], [place], [place], [address] [phone]    Dashawn Starkey MD Internal Medicine Schedule an appointment as soon as possible for a visit   [address] [place] [address] [phone]               Discharge Medication List as of 7/5/2024  9:16 AM        CONTINUE these medications which have NOT CHANGED    Details   acetaminophen (TYLENOL) 325 mg tablet Take 2 tablets (650 mg total) by mouth every 6 (six) hours as needed for mild pain, Starting Tue 10/24/2023, OTC      allopurinol (ZYLOPRIM) 100 mg tablet Take 2 tablets (200 mg total) by mouth daily, Starting Wed 1/3/2024, Normal      apixaban (ELIQUIS) 5 mg Take 1 tablet (5 mg total) by mouth 2 (two) times a day, Starting Fri 3/15/2024, Until Mon 3/10/2025, Normal      digoxin (LANOXIN) 0.125 mg tablet Take 1 tablet (125 mcg total) by mouth daily, Starting Wed 1/3/2024, Normal      Imbruvica 140 MG CAPS Take 1 capsule (140 mg total) by mouth 2 (two) times a day, Starting Tue 7/19/2022, Normal      nebivolol (Bystolic) 10 mg tablet Take 1 tablet (10 mg total) by mouth daily, Starting Wed 1/3/2024, Normal      rosuvastatin (CRESTOR) 5 mg tablet Take 1 tablet (5 mg total) by mouth daily, Starting Wed 1/3/2024, Normal      tamsulosin (Flomax) 0.4 mg Take 1 capsule (0.4 mg total) by mouth daily with dinner, Starting Tue 10/10/2023, Normal      traMADol (Ultram) 50 mg  tablet Take 1 tablet (50 mg total) by mouth daily as needed for moderate pain, Starting Wed 6/5/2024, Normal      valsartan (DIOVAN) 40 mg tablet Take 1 tablet (40 mg total) by mouth daily, Starting Wed 1/3/2024, Normal           No discharge procedures on file.    PDMP Review         Value Time User    PDMP Reviewed  Yes 6/5/2024 11:43 AM Dashawn Starkey MD             ED Provider  Attending physically available and evaluated Jose Cruz Alejandro. I managed the patient along with the ED Attending.    Electronically Signed by           Naveen Cano MD  07/05/24 2457

## 2024-07-05 NOTE — TELEPHONE ENCOUNTER
Pt wife, Tessie called, requesting a return call. Her  was in the ER for right wrist pain. They did an xray but didn't give him any medication for the pain.  Would like to know if Dr. Starkey can call something into the pharmacy for pain.  [address], [phone]. Please advise.

## 2024-07-05 NOTE — TELEPHONE ENCOUNTER
Called and spoke with pts wife she is aware of medication and spoke with ortho will call back to schedule appt once her  wakes up.

## 2024-07-06 ENCOUNTER — TELEPHONE (OUTPATIENT)
Age: 79
End: 2024-07-06

## 2024-07-08 ENCOUNTER — TELEPHONE (OUTPATIENT)
Dept: FAMILY MEDICINE CLINIC | Facility: CLINIC | Age: 79
End: 2024-07-08

## 2024-07-08 NOTE — TELEPHONE ENCOUNTER
Pts wife called stating that the depakot that was called in last week and the pain is not going away and swelling is worse. Was seen at the er. Pt wants to come in to be seen today please advise.

## 2024-07-09 ENCOUNTER — APPOINTMENT (OUTPATIENT)
Dept: RADIOLOGY | Facility: MEDICAL CENTER | Age: 79
End: 2024-07-09
Payer: MEDICARE

## 2024-07-09 ENCOUNTER — TELEPHONE (OUTPATIENT)
Dept: FAMILY MEDICINE CLINIC | Facility: CLINIC | Age: 79
End: 2024-07-09

## 2024-07-09 ENCOUNTER — TELEPHONE (OUTPATIENT)
Dept: OBGYN CLINIC | Facility: HOSPITAL | Age: 79
End: 2024-07-09

## 2024-07-09 ENCOUNTER — OFFICE VISIT (OUTPATIENT)
Dept: FAMILY MEDICINE CLINIC | Facility: CLINIC | Age: 79
End: 2024-07-09
Payer: MEDICARE

## 2024-07-09 VITALS
HEIGHT: 66 IN | BODY MASS INDEX: 24.91 KG/M2 | SYSTOLIC BLOOD PRESSURE: 126 MMHG | WEIGHT: 155 LBS | DIASTOLIC BLOOD PRESSURE: 82 MMHG | TEMPERATURE: 98 F | HEART RATE: 74 BPM | OXYGEN SATURATION: 98 % | RESPIRATION RATE: 14 BRPM

## 2024-07-09 DIAGNOSIS — M25.431 PAIN AND SWELLING OF RIGHT WRIST: ICD-10-CM

## 2024-07-09 DIAGNOSIS — I27.20 MILD PULMONARY HYPERTENSION (HCC): ICD-10-CM

## 2024-07-09 DIAGNOSIS — M25.531 PAIN AND SWELLING OF RIGHT WRIST: Primary | ICD-10-CM

## 2024-07-09 DIAGNOSIS — C91.10 CHRONIC LYMPHOCYTIC LEUKEMIA (HCC): ICD-10-CM

## 2024-07-09 DIAGNOSIS — M25.531 PAIN AND SWELLING OF RIGHT WRIST: ICD-10-CM

## 2024-07-09 DIAGNOSIS — D32.9 MENINGIOMA (HCC): ICD-10-CM

## 2024-07-09 DIAGNOSIS — M25.431 PAIN AND SWELLING OF RIGHT WRIST: Primary | ICD-10-CM

## 2024-07-09 DIAGNOSIS — I48.19 PERSISTENT ATRIAL FIBRILLATION (HCC): ICD-10-CM

## 2024-07-09 DIAGNOSIS — I73.9 PERIPHERAL VASCULAR DISEASE (HCC): ICD-10-CM

## 2024-07-09 PROCEDURE — 99214 OFFICE O/P EST MOD 30 MIN: CPT | Performed by: INTERNAL MEDICINE

## 2024-07-09 PROCEDURE — 73130 X-RAY EXAM OF HAND: CPT

## 2024-07-09 PROCEDURE — G2211 COMPLEX E/M VISIT ADD ON: HCPCS | Performed by: INTERNAL MEDICINE

## 2024-07-09 NOTE — TELEPHONE ENCOUNTER
Hello,    Please advise if a forced appointment can be accommodated for the patient:    Call back #: [phone]    Insurance: Medicare    Reason for appointment: Chronic appearing distal radial fracture displaced ulnar styloid process fracture.     Sending [facility] On, because they only want [place] and PCP wants seen ASAP.    Requested doctor and/or location: [facility]      Thank you.

## 2024-07-09 NOTE — TELEPHONE ENCOUNTER
Called and spoke with pts wife reviewed xray results and was made aware of referral for ortho. Pts wife will call to schedule an appointment as soon as possible.

## 2024-07-09 NOTE — PROGRESS NOTES
Ambulatory Visit  Name: Jose Cruz Alejandro      : 1945      MRN: [id number]  Encounter Provider: Dashawn Starkey MD  Encounter Date: 2024   Encounter department: [facility]    Assessment & Plan   1. Pain and swelling of right wrist  Comments:  Cause ?? STAT; X rays  Orthoepdic Consult ASAP  RTC in 2-3 mos w Blood work  Orders:  -     XR hand 3+ vw right; Future; Expected date: 2024  -     Ambulatory Referral to Orthopedic Surgery; Future  2. Persistent atrial fibrillation (HCC)  Comments:  stable  continue same  FU w Cardiology  RTC in 2-3 mos w  Blood work  3. Mild pulmonary hypertension (HCC)  Comments:  stable  continue same  FU w cardiology  4. Peripheral vascular disease (HCC)  Comments:  stable  FU w vascular  5. Meningioma (HCC)  Comments:  stable  6. Chronic lymphocytic leukemia (HCC)  Comments:  in remission  continue same  FU w Hematology  RTC in 1 mo w Blood work       History of Present Illness     78 Y O man is here for Regular check Up, he has increased swelling in right wrist/hand ? Was seen in E R ,. Med list reviewed,...        Review of Systems   Constitutional:  Negative for chills, fatigue and fever.   HENT:  Negative for congestion, facial swelling, sore throat, trouble swallowing and voice change.    Eyes:  Negative for pain, discharge and visual disturbance.   Respiratory:  Negative for cough, shortness of breath and wheezing.    Cardiovascular:  Negative for chest pain, palpitations and leg swelling.   Gastrointestinal:  Negative for abdominal pain, blood in stool, constipation, diarrhea and nausea.   Endocrine: Negative for polydipsia, polyphagia and polyuria.   Genitourinary:  Negative for difficulty urinating, hematuria and urgency.   Musculoskeletal:  Positive for joint swelling. Negative for arthralgias and myalgias.   Skin:  Negative for rash.   Neurological:  Negative for dizziness, tremors, weakness and headaches.   Hematological:   "Negative for adenopathy. Does not bruise/bleed easily.   Psychiatric/Behavioral:  Negative for dysphoric mood, sleep disturbance and suicidal ideas.        Objective     /82 (BP Location: Left arm, Patient Position: Sitting, Cuff Size: Standard)   Pulse 74   Temp 98 °F (36.7 °C) (Tympanic)   Resp 14   Ht 5' 6\" (1.676 m)   Wt 70.3 kg (155 lb)   SpO2 98%   BMI 25.02 kg/m²     Physical Exam  Vitals and nursing note reviewed.   Constitutional:       General: He is not in acute distress.     Appearance: He is well-developed.   HENT:      Head: Normocephalic and atraumatic.      Right Ear: External ear normal.      Left Ear: External ear normal.   Eyes:      Extraocular Movements: EOM normal.      Conjunctiva/sclera: Conjunctivae normal.      Pupils: Pupils are equal, round, and reactive to light.   Neck:      Thyroid: No thyromegaly.      Trachea: No tracheal deviation.   Cardiovascular:      Rate and Rhythm: Normal rate and regular rhythm.      Heart sounds: Normal heart sounds. No murmur heard.     No friction rub.   Pulmonary:      Effort: Pulmonary effort is normal. No respiratory distress.      Breath sounds: Normal breath sounds. No wheezing.   Abdominal:      General: Bowel sounds are normal. There is no distension.      Palpations: Abdomen is soft.      Tenderness: There is no abdominal tenderness.   Musculoskeletal:         General: Swelling and tenderness present. No deformity or edema. Normal range of motion.      Cervical back: Neck supple.      Comments: Right wrist/Hand   Skin:     General: Skin is warm and dry.      Capillary Refill: Capillary refill takes less than 2 seconds.      Findings: No erythema or rash.   Neurological:      Mental Status: He is alert and oriented to person, place, and time.      Cranial Nerves: No cranial nerve deficit.      Coordination: Coordination normal.      Deep Tendon Reflexes: Reflexes normal.   Psychiatric:         Mood and Affect: Mood and affect and mood " normal.         Behavior: Behavior normal.       Administrative Statements

## 2024-07-09 NOTE — TELEPHONE ENCOUNTER
----- Message from Dashawn Starkey MD sent at 7/9/2024  2:04 PM EDT -----  Orthopedic Consult ASAP  ----- Message -----  From: Interface, Radiology Results In  Sent: 7/9/2024   1:08 PM EDT  To: Dashawn Starkey MD

## 2024-07-10 ENCOUNTER — RA CDI HCC (OUTPATIENT)
Dept: OTHER | Facility: HOSPITAL | Age: 79
End: 2024-07-10

## 2024-07-10 ENCOUNTER — OFFICE VISIT (OUTPATIENT)
Dept: OBGYN CLINIC | Facility: CLINIC | Age: 79
End: 2024-07-10
Payer: MEDICARE

## 2024-07-10 VITALS
WEIGHT: 155 LBS | BODY MASS INDEX: 24.91 KG/M2 | HEIGHT: 66 IN | SYSTOLIC BLOOD PRESSURE: 126 MMHG | DIASTOLIC BLOOD PRESSURE: 75 MMHG

## 2024-07-10 DIAGNOSIS — M25.531 PAIN AND SWELLING OF RIGHT WRIST: ICD-10-CM

## 2024-07-10 DIAGNOSIS — M25.431 PAIN AND SWELLING OF RIGHT WRIST: ICD-10-CM

## 2024-07-10 DIAGNOSIS — M19.131 SLAC (SCAPHOLUNATE ADVANCED COLLAPSE) OF WRIST, RIGHT: ICD-10-CM

## 2024-07-10 PROCEDURE — 99203 OFFICE O/P NEW LOW 30 MIN: CPT | Performed by: SURGERY

## 2024-07-10 NOTE — PROGRESS NOTES
Hand Surgery History and Physical    07/10/24  2:53 PM  [id number]  Jose Cruz Alejandro      HPI:  Pt is a 79 yo male who presents for evaluation of his right wrist.  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  He noted wrist swelling which is improving, and reduced finger ROM which is also improving.  Denies significant discomfort prior to this event.      Past Medical History:   Diagnosis Date    Atrial fibrillation (HCC)     CLL (chronic lymphocytic leukemia) (HCC)     HLD (hyperlipidemia)     HTN (hypertension)     Hypertension     Leukemia (HCC)     Meningioma (HCC)     Osteoarthritis 9/26/2012    PAD (peripheral artery disease) (HCC)        Past Surgical History:   Procedure Laterality Date    CARDIOVERSION ([place] ONLY)  6/12/2020         NO PAST SURGERIES      [place] ECHO TRANSESOPHAG R-T 2D W/PRB IMG ACQUISJ I&R  6/12/2020            Family History   Problem Relation Age of Onset    Diabetes Mother     Heart disease Father        Review of Systems - General ROS: negative  Ophthalmic ROS: negative  ENT ROS: negative  Respiratory ROS: negative  Cardiovascular ROS: negative  Neurological ROS: negative  Dermatological ROS: negative    Vitals:    07/10/24 1400   BP: 126/75       Physical Examination:  General:  NAD  HEENT:  EOMI, perrla, normal ear morphology  Chest:  Unlabored breathing  Heart:  Regular pulse  Abd:  No distension  Extrem: RUE:  dorsal wrist synovitis over radial side, able to make a fist complex with some effort, no erythema, no induration, tender on palpation of wrist more on the dorsal and volar radial side, discomfort on active wrist ROM  Skin:  Dry, pink  Neuro:  AAOx3     Encounter Diagnosis   Name Primary?    Slac (scapholunate advanced collapse) of wrist, right      Return in about 6 weeks (around 8/21/2024).      A/P:  Pt is a 79 yo male with right SLAC wrist.   -Plain films independently reviewed:  significant radioscaphoid arthritic changes, DISI, mild radiolunate  changes.  -Exacerbation of underyling SLAC wrist.    -Wrist splint mainly at night.  May use during day as needed.  -Finger ROM exercises.    -NSAIDs as needed for discomfort.   -F/U in 6-8 weeks.

## 2024-07-10 NOTE — PROGRESS NOTES
HCC coding opportunities          Chart Reviewed number of suggestions sent to Provider: 2     Patients Insurance   I11.0 and E11.51  Medicare Insurance: Medicare

## 2024-07-16 ENCOUNTER — OFFICE VISIT (OUTPATIENT)
Dept: FAMILY MEDICINE CLINIC | Facility: CLINIC | Age: 79
End: 2024-07-16
Payer: MEDICARE

## 2024-07-16 VITALS
RESPIRATION RATE: 14 BRPM | HEART RATE: 76 BPM | WEIGHT: 155 LBS | OXYGEN SATURATION: 98 % | TEMPERATURE: 98.1 F | HEIGHT: 66 IN | BODY MASS INDEX: 24.91 KG/M2 | DIASTOLIC BLOOD PRESSURE: 74 MMHG | SYSTOLIC BLOOD PRESSURE: 126 MMHG

## 2024-07-16 DIAGNOSIS — I48.19 PERSISTENT ATRIAL FIBRILLATION (HCC): ICD-10-CM

## 2024-07-16 DIAGNOSIS — M25.431 SWELLING OF RIGHT WRIST: ICD-10-CM

## 2024-07-16 DIAGNOSIS — Z12.11 SCREEN FOR COLON CANCER: ICD-10-CM

## 2024-07-16 DIAGNOSIS — M81.8 IDIOPATHIC OSTEOPOROSIS: ICD-10-CM

## 2024-07-16 DIAGNOSIS — M25.431 PAIN AND SWELLING OF RIGHT WRIST: Primary | ICD-10-CM

## 2024-07-16 DIAGNOSIS — M25.531 PAIN AND SWELLING OF RIGHT WRIST: Primary | ICD-10-CM

## 2024-07-16 PROCEDURE — G2211 COMPLEX E/M VISIT ADD ON: HCPCS | Performed by: INTERNAL MEDICINE

## 2024-07-16 PROCEDURE — 99214 OFFICE O/P EST MOD 30 MIN: CPT | Performed by: INTERNAL MEDICINE

## 2024-07-16 NOTE — PROGRESS NOTES
Ambulatory Visit  Name: Jose Cruz Alejandro      : 1945      MRN: [id number]  Encounter Provider: Dashawn Starkey MD  Encounter Date: 2024   Encounter department: [facility]    Assessment & Plan   1. Pain and swelling of right wrist  Comments:  is getting worse; 2nd opinion orthopedic consult  Orders:  -     Ambulatory Referral to Orthopedic Surgery; Future  2. Persistent atrial fibrillation (HCC)  Comments:  stable   continue same   fu w cardiology  rtc in 2-3 mos w blood work  Orders:  -     Echo complete w/ contrast if indicated; Future; Expected date: 2024  3. Swelling of right wrist  -     Ambulatory Referral to Orthopedic Surgery; Future  4. Screen for colon cancer  -     Ambulatory referral to Gastroenterology; Future; Expected date: 2024  5. Idiopathic osteoporosis  -     DXA bone density spine hip and pelvis; Future; Expected date: 2024  RTC in 3 mos w Blood work       History of Present Illness     78 Y O Man is here for Regular check up, and still has swelling, LOM ,pain of right wrist ? Was seen by Orthopedic, .         Review of Systems   Constitutional:  Negative for chills, fatigue and fever.   HENT:  Positive for postnasal drip. Negative for congestion, facial swelling, sore throat, trouble swallowing and voice change.    Eyes:  Negative for pain, discharge and visual disturbance.   Respiratory:  Negative for cough, shortness of breath and wheezing.    Cardiovascular:  Negative for chest pain, palpitations and leg swelling.   Gastrointestinal:  Negative for abdominal pain, blood in stool, constipation, diarrhea and nausea.   Endocrine: Negative for polydipsia, polyphagia and polyuria.   Genitourinary:  Negative for difficulty urinating, hematuria and urgency.   Musculoskeletal:  Positive for joint swelling. Negative for arthralgias and myalgias.   Skin:  Negative for rash.   Neurological:  Negative for dizziness, tremors, weakness and  "headaches.   Hematological:  Negative for adenopathy. Does not bruise/bleed easily.   Psychiatric/Behavioral:  Negative for dysphoric mood, sleep disturbance and suicidal ideas.        Objective     /74 (BP Location: Left arm, Patient Position: Sitting, Cuff Size: Standard)   Pulse 76   Temp 98.1 °F (36.7 °C) (Tympanic)   Resp 14   Ht 5' 6\" (1.676 m)   Wt 70.3 kg (155 lb)   SpO2 98%   BMI 25.02 kg/m²     Physical Exam  Constitutional:       General: He is not in acute distress.     Appearance: He is well-developed.   HENT:      Head: Normocephalic.      Right Ear: External ear normal.      Left Ear: External ear normal.   Eyes:      Extraocular Movements: EOM normal.      Pupils: Pupils are equal, round, and reactive to light.   Neck:      Thyroid: No thyromegaly.      Trachea: No tracheal deviation.   Cardiovascular:      Rate and Rhythm: Normal rate and regular rhythm.      Heart sounds: Normal heart sounds. No murmur heard.     No friction rub.   Pulmonary:      Effort: Pulmonary effort is normal. No respiratory distress.      Breath sounds: Normal breath sounds. No wheezing.   Abdominal:      General: Bowel sounds are normal. There is no distension.      Palpations: Abdomen is soft.   Musculoskeletal:         General: Swelling and tenderness present. No deformity or edema. Normal range of motion.      Cervical back: Neck supple.   Skin:     General: Skin is warm and dry.      Findings: No erythema or rash.   Neurological:      Mental Status: He is alert and oriented to person, place, and time.      Cranial Nerves: No cranial nerve deficit.      Coordination: Coordination normal.      Deep Tendon Reflexes: Reflexes normal.   Psychiatric:         Mood and Affect: Mood and affect normal.         Behavior: Behavior normal.       Administrative Statements     "

## 2024-07-16 NOTE — PATIENT INSTRUCTIONS

## 2024-07-17 DIAGNOSIS — M25.551 ACUTE PAIN OF RIGHT HIP: ICD-10-CM

## 2024-07-17 DIAGNOSIS — M54.16 LUMBAR RADICULOPATHY: ICD-10-CM

## 2024-07-17 RX ORDER — TRAMADOL HYDROCHLORIDE 50 MG/1
50 TABLET ORAL DAILY PRN
Qty: 30 TABLET | Refills: 0 | Status: SHIPPED | OUTPATIENT
Start: 2024-07-17

## 2024-07-26 DIAGNOSIS — M1A.29X0 DRUG-INDUCED CHRONIC GOUT OF MULTIPLE SITES WITHOUT TOPHUS: ICD-10-CM

## 2024-07-26 RX ORDER — ALLOPURINOL 100 MG/1
200 TABLET ORAL DAILY
Qty: 180 TABLET | Refills: 3 | Status: SHIPPED | OUTPATIENT
Start: 2024-07-26

## 2024-07-26 NOTE — TELEPHONE ENCOUNTER
Dashawn Starkey MD   to Thuy Tian       7/5/24 10:51 AM  Dolobid, BID, even if it is not covered it will cost less than $30 on [employer],. And orthopedic consult ASAP        *Called and spoke to [facility], patient picked medication up on 7/5/24 with a discount card. No auth needed.

## 2024-09-04 DIAGNOSIS — I10 ESSENTIAL HYPERTENSION, MALIGNANT: ICD-10-CM

## 2024-09-04 RX ORDER — NEBIVOLOL 10 MG/1
10 TABLET ORAL DAILY
Qty: 90 TABLET | Refills: 1 | Status: SHIPPED | OUTPATIENT
Start: 2024-09-04

## 2024-09-04 NOTE — TELEPHONE ENCOUNTER
Reason for call:   [x] Refill   [] Prior Auth  [x] Other: Some how when [facility] closed and this script was transferred to [facility] they did not transfer all of the refills, The patient needs a new script at this time.    Office:   [x] PCP/Provider - Dashawn Starkey MD   [] Specialty/Provider -     Medication: nebivolol (Bystolic) 10 mg tablet     Dose/Frequency: Take 1 tablet (10 mg total) by mouth daily     Quantity: 90    Pharmacy: [id number]  [place] [address] [phone]    Does the patient have enough for 3 days?   [] Yes   [x] No - Send as HP to POD

## 2024-09-10 ENCOUNTER — OFFICE VISIT (OUTPATIENT)
Dept: FAMILY MEDICINE CLINIC | Facility: CLINIC | Age: 79
End: 2024-09-10
Payer: MEDICARE

## 2024-09-10 VITALS
SYSTOLIC BLOOD PRESSURE: 124 MMHG | BODY MASS INDEX: 24.75 KG/M2 | HEIGHT: 66 IN | OXYGEN SATURATION: 99 % | WEIGHT: 154 LBS | RESPIRATION RATE: 14 BRPM | HEART RATE: 70 BPM | DIASTOLIC BLOOD PRESSURE: 84 MMHG | TEMPERATURE: 98.2 F

## 2024-09-10 DIAGNOSIS — M25.431 SWELLING OF RIGHT WRIST: ICD-10-CM

## 2024-09-10 DIAGNOSIS — Z23 ENCOUNTER FOR IMMUNIZATION: ICD-10-CM

## 2024-09-10 DIAGNOSIS — M47.818 SI JOINT ARTHRITIS: Primary | ICD-10-CM

## 2024-09-10 PROCEDURE — 96372 THER/PROPH/DIAG INJ SC/IM: CPT | Performed by: INTERNAL MEDICINE

## 2024-09-10 PROCEDURE — G0008 ADMIN INFLUENZA VIRUS VAC: HCPCS

## 2024-09-10 PROCEDURE — 99214 OFFICE O/P EST MOD 30 MIN: CPT | Performed by: INTERNAL MEDICINE

## 2024-09-10 PROCEDURE — 90662 IIV NO PRSV INCREASED AG IM: CPT

## 2024-09-10 RX ORDER — DIFLUNISAL 500 MG/1
500 TABLET, FILM COATED ORAL 2 TIMES DAILY
Qty: 60 TABLET | Refills: 0 | Status: SHIPPED | OUTPATIENT
Start: 2024-09-10 | End: 2024-09-12

## 2024-09-10 RX ORDER — METHYLPREDNISOLONE SODIUM SUCCINATE 125 MG/2ML
125 INJECTION, POWDER, LYOPHILIZED, FOR SOLUTION INTRAMUSCULAR; INTRAVENOUS ONCE
Status: COMPLETED | OUTPATIENT
Start: 2024-09-10 | End: 2024-09-10

## 2024-09-10 RX ADMIN — METHYLPREDNISOLONE SODIUM SUCCINATE 125 MG: 125 INJECTION, POWDER, LYOPHILIZED, FOR SOLUTION INTRAMUSCULAR; INTRAVENOUS at 14:35

## 2024-09-10 NOTE — PROGRESS NOTES
"Ambulatory Visit  Name: Jose Cruz Alejandro      : 1945      MRN: [id number]  Encounter Provider: Dashawn Starkey MD  Encounter Date: 9/10/2024   Encounter department: [facility]    Assessment & Plan  Swelling of right wrist    Orders:    diflunisal (DOLOBID) 500 mg tablet; Take 1 tablet (500 mg total) by mouth 2 (two) times a day    Encounter for immunization    Orders:    influenza vaccine, high-dose, PF 0.5 mL (Fluzone High Dose)    SI joint arthritis    Orders:    methylPREDNISolone sodium succinate (Solu-MEDROL) injection 125 mg  RTC in 1-2 mos  w Blood work       History of Present Illness     78 Y O Man is here for Regular check up, he has increased pain in right lower back , Mod to severe,...          Review of Systems   Constitutional:  Negative for chills, fatigue and fever.   HENT:  Negative for congestion, facial swelling, sore throat, trouble swallowing and voice change.    Eyes:  Negative for pain, discharge and visual disturbance.   Respiratory:  Negative for cough, shortness of breath and wheezing.    Cardiovascular:  Negative for chest pain, palpitations and leg swelling.   Gastrointestinal:  Negative for abdominal pain, blood in stool, constipation, diarrhea and nausea.   Endocrine: Negative for polydipsia, polyphagia and polyuria.   Genitourinary:  Negative for difficulty urinating, hematuria and urgency.   Musculoskeletal:  Positive for back pain. Negative for arthralgias and myalgias.   Skin:  Negative for rash.   Neurological:  Negative for dizziness, tremors, weakness and headaches.   Hematological:  Negative for adenopathy. Does not bruise/bleed easily.   Psychiatric/Behavioral:  Negative for dysphoric mood, sleep disturbance and suicidal ideas.            Objective     /84 (BP Location: Left arm, Patient Position: Sitting, Cuff Size: Standard)   Pulse 70   Temp 98.2 °F (36.8 °C) (Tympanic)   Resp 14   Ht 5' 6\" (1.676 m)   Wt 69.9 kg (154 lb) "   SpO2 99%   BMI 24.86 kg/m²     Physical Exam  Constitutional:       General: He is not in acute distress.     Appearance: He is well-developed.   HENT:      Head: Normocephalic.      Right Ear: External ear normal.      Left Ear: External ear normal.   Eyes:      Extraocular Movements: EOM normal.      Pupils: Pupils are equal, round, and reactive to light.   Neck:      Thyroid: No thyromegaly.      Trachea: No tracheal deviation.   Cardiovascular:      Rate and Rhythm: Normal rate and regular rhythm.      Heart sounds: Normal heart sounds. No murmur heard.     No friction rub.   Pulmonary:      Effort: Pulmonary effort is normal. No respiratory distress.      Breath sounds: Normal breath sounds. No wheezing.   Abdominal:      General: Bowel sounds are normal. There is no distension.      Palpations: Abdomen is soft.   Musculoskeletal:         General: Tenderness present. No deformity or edema. Normal range of motion.      Cervical back: Neck supple.      Comments: Right S I Joint   Skin:     General: Skin is warm and dry.      Findings: No erythema or rash.   Neurological:      Mental Status: He is alert and oriented to person, place, and time.      Cranial Nerves: No cranial nerve deficit.      Coordination: Coordination normal.      Deep Tendon Reflexes: Reflexes normal.   Psychiatric:         Mood and Affect: Mood and affect normal.         Behavior: Behavior normal.

## 2024-09-11 ENCOUNTER — TELEPHONE (OUTPATIENT)
Dept: FAMILY MEDICINE CLINIC | Facility: CLINIC | Age: 79
End: 2024-09-11

## 2024-09-11 NOTE — TELEPHONE ENCOUNTER
Diflunisal prior auth    Swelling of right wrist  M25.431  SI joint arthritis  M47.818    Scanned into media

## 2024-09-12 DIAGNOSIS — M54.16 LUMBAR RADICULOPATHY: ICD-10-CM

## 2024-09-12 DIAGNOSIS — M25.551 ACUTE PAIN OF RIGHT HIP: ICD-10-CM

## 2024-09-12 RX ORDER — TRAMADOL HYDROCHLORIDE 50 MG/1
50 TABLET ORAL DAILY PRN
Qty: 30 TABLET | Refills: 0 | Status: SHIPPED | OUTPATIENT
Start: 2024-09-12

## 2024-09-12 NOTE — TELEPHONE ENCOUNTER
PA for DIFLUNISAL 500MG TABLET SUBMITTED     via    []CMM-KEY:   []Surescripts-Case ID #   []Faxed to plan   [x]Other website-PromptPA: [id number]  []Phone call Case ID #     Office notes sent, clinical questions answered. Awaiting determination    Turnaround time for your insurance to make a decision on your Prior Authorization can take 7-21 business days.

## 2024-09-12 NOTE — TELEPHONE ENCOUNTER
PA for DIFLUNISAL 500MG TABLET DENIED    Reason:    Message sent to office clinical pool Yes    Denial letter scanned into Media Yes    Appeal started No (Provider will need to decide if appeal is warranted and send clinical documentation to Prior Authorization Team for initiation.)    **Please follow up with your patient regarding denial and next steps**

## 2024-09-13 DIAGNOSIS — M54.16 LUMBAR RADICULOPATHY: ICD-10-CM

## 2024-09-13 DIAGNOSIS — M47.818 SI JOINT ARTHRITIS: Primary | ICD-10-CM

## 2024-09-13 RX ORDER — LIDOCAINE 50 MG/G
1 PATCH TOPICAL DAILY
Qty: 30 PATCH | Refills: 0 | Status: SHIPPED | OUTPATIENT
Start: 2024-09-13

## 2024-09-13 RX ORDER — PREDNISONE 10 MG/1
TABLET ORAL
Qty: 20 TABLET | Refills: 0 | Status: SHIPPED | OUTPATIENT
Start: 2024-09-13

## 2024-10-06 DIAGNOSIS — N40.0 ENLARGED PROSTATE: ICD-10-CM

## 2024-10-07 RX ORDER — TAMSULOSIN HYDROCHLORIDE 0.4 MG/1
0.4 CAPSULE ORAL
Qty: 90 CAPSULE | Refills: 1 | Status: SHIPPED | OUTPATIENT
Start: 2024-10-07

## 2024-10-16 ENCOUNTER — RA CDI HCC (OUTPATIENT)
Dept: OTHER | Facility: HOSPITAL | Age: 79
End: 2024-10-16

## 2024-11-12 DIAGNOSIS — D32.9 MENINGIOMA (HCC): ICD-10-CM

## 2024-11-12 NOTE — TELEPHONE ENCOUNTER
PLEASE DO NOT SEND TO [facility]     Medication: apixaban (ELIQUIS) 5 mg     Dose/Frequency: Take 1 tablet (5 mg total) by mouth 2 (two) times a day     Quantity: 180 tabs    Pharmacy: [facility]   IPS#[id number] [phone] [place]    Office:   [] PCP/Provider -   [x] Speciality/Provider - Dr. Sanchez    Does the patient have enough for 3 days?   [x] Yes   [] No - Send as HP to POD       Patient daughter called in because Dr. Sanchez had faxed over a script to [facility] for eliquis (this is a [place] pharmacy) and pt needs a new script sent. He is all out of refills. FAX is in PT Chart on 3/13/24    P.S I did not QUE up MED due to I was not able to add the [place] pharmacy to pt chart.

## 2024-11-12 NOTE — TELEPHONE ENCOUNTER
Patient is requesting a refill to be faxed to the [facility] pharmacy listed below.   He does not want the refill to go to [facility].

## 2024-11-21 ENCOUNTER — OFFICE VISIT (OUTPATIENT)
Dept: FAMILY MEDICINE CLINIC | Facility: CLINIC | Age: 79
End: 2024-11-21
Payer: MEDICARE

## 2024-11-21 VITALS
RESPIRATION RATE: 16 BRPM | DIASTOLIC BLOOD PRESSURE: 80 MMHG | BODY MASS INDEX: 25.55 KG/M2 | TEMPERATURE: 98 F | WEIGHT: 159 LBS | HEIGHT: 66 IN | SYSTOLIC BLOOD PRESSURE: 130 MMHG | HEART RATE: 88 BPM | OXYGEN SATURATION: 98 %

## 2024-11-21 DIAGNOSIS — I73.9 PERIPHERAL VASCULAR DISEASE (HCC): ICD-10-CM

## 2024-11-21 DIAGNOSIS — N40.0 ENLARGED PROSTATE: ICD-10-CM

## 2024-11-21 DIAGNOSIS — C91.10 CHRONIC LYMPHOCYTIC LEUKEMIA (HCC): ICD-10-CM

## 2024-11-21 DIAGNOSIS — I48.20 CHRONIC ATRIAL FIBRILLATION (HCC): ICD-10-CM

## 2024-11-21 DIAGNOSIS — D32.9 MENINGIOMA (HCC): ICD-10-CM

## 2024-11-21 DIAGNOSIS — M35.3 PMR (POLYMYALGIA RHEUMATICA) (HCC): ICD-10-CM

## 2024-11-21 DIAGNOSIS — E11.69 HYPERLIPIDEMIA ASSOCIATED WITH TYPE 2 DIABETES MELLITUS  (HCC): ICD-10-CM

## 2024-11-21 DIAGNOSIS — I48.19 PERSISTENT ATRIAL FIBRILLATION (HCC): ICD-10-CM

## 2024-11-21 DIAGNOSIS — Z00.01 ENCOUNTER FOR GENERAL ADULT MEDICAL EXAMINATION WITH ABNORMAL FINDINGS: Primary | ICD-10-CM

## 2024-11-21 DIAGNOSIS — E78.5 HYPERLIPIDEMIA ASSOCIATED WITH TYPE 2 DIABETES MELLITUS  (HCC): ICD-10-CM

## 2024-11-21 DIAGNOSIS — I50.42 CHRONIC COMBINED SYSTOLIC AND DIASTOLIC HEART FAILURE (HCC): ICD-10-CM

## 2024-11-21 DIAGNOSIS — K21.9 GASTROESOPHAGEAL REFLUX DISEASE WITHOUT ESOPHAGITIS: ICD-10-CM

## 2024-11-21 DIAGNOSIS — I27.20 MILD PULMONARY HYPERTENSION (HCC): ICD-10-CM

## 2024-11-21 DIAGNOSIS — I10 ESSENTIAL HYPERTENSION, MALIGNANT: ICD-10-CM

## 2024-11-21 PROCEDURE — G0439 PPPS, SUBSEQ VISIT: HCPCS | Performed by: INTERNAL MEDICINE

## 2024-11-21 PROCEDURE — 99214 OFFICE O/P EST MOD 30 MIN: CPT | Performed by: INTERNAL MEDICINE

## 2024-11-21 RX ORDER — VALSARTAN 40 MG/1
40 TABLET ORAL DAILY
Qty: 180 TABLET | Refills: 3 | Status: SHIPPED | OUTPATIENT
Start: 2024-11-21

## 2024-11-21 RX ORDER — DIGOXIN 125 MCG
125 TABLET ORAL DAILY
Qty: 45 TABLET | Refills: 3 | Status: SHIPPED | OUTPATIENT
Start: 2024-11-21

## 2024-11-21 RX ORDER — ROSUVASTATIN CALCIUM 5 MG/1
5 TABLET, COATED ORAL DAILY
Qty: 90 TABLET | Refills: 3 | Status: SHIPPED | OUTPATIENT
Start: 2024-11-21

## 2024-11-21 RX ORDER — PREDNISONE 5 MG/1
5 TABLET ORAL
Qty: 30 TABLET | Refills: 1 | Status: SHIPPED | OUTPATIENT
Start: 2024-11-21

## 2024-11-21 RX ORDER — NEBIVOLOL 10 MG/1
10 TABLET ORAL DAILY
Qty: 90 TABLET | Refills: 1 | Status: SHIPPED | OUTPATIENT
Start: 2024-11-21

## 2024-11-21 NOTE — PATIENT INSTRUCTIONS
Medicare Preventive Visit Patient Instructions  Thank you for completing your Welcome to Medicare Visit or Medicare Annual Wellness Visit today. Your next wellness visit will be due in one year (11/22/2025).  The screening/preventive services that you may require over the next 5-10 years are detailed below. Some tests may not apply to you based off risk factors and/or age. Screening tests ordered at today's visit but not completed yet may show as past due. Also, please note that scanned in results may not display below.  Preventive Screenings:  Service Recommendations Previous Testing/Comments   Colorectal Cancer Screening  Colonoscopy    Fecal Occult Blood Test (FOBT)/Fecal Immunochemical Test (FIT)  Fecal DNA/Cologuard Test  Flexible Sigmoidoscopy Age: 45-75 years old   Colonoscopy: every 10 years (May be performed more frequently if at higher risk)  OR  FOBT/FIT: every 1 year  OR  Cologuard: every 3 years  OR  Sigmoidoscopy: every 5 years  Screening may be recommended earlier than age 45 if at higher risk for colorectal cancer. Also, an individualized decision between you and your healthcare provider will decide whether screening between the ages of 76-85 would be appropriate. Colonoscopy: 11/07/2013  FOBT/FIT: Not on file  Cologuard: Not on file  Sigmoidoscopy: Not on file    Screening Current  Risks and Benefits Discussed     Prostate Cancer Screening Individualized decision between patient and health care provider in men between ages of 55-69   Medicare will cover every 12 months beginning on the day after your 50th birthday PSA: 1.8 ng/mL     Screening Not Indicated     Hepatitis C Screening Once for adults born between 1945 and 1965  More frequently in patients at high risk for Hepatitis C Hep C Antibody: 01/30/2020    Screening Current   Diabetes Screening 1-2 times per year if you're at risk for diabetes or have pre-diabetes Fasting glucose: 99 mg/dL (6/27/2023)  A1C: 5.8 (6/5/2024)  Screening Not  Indicated  History Diabetes   Cholesterol Screening Once every 5 years if you don't have a lipid disorder. May order more often based on risk factors. Lipid panel: 06/27/2023  Screening Not Indicated  History Lipid Disorder      Other Preventive Screenings Covered by Medicare:  Abdominal Aortic Aneurysm (AAA) Screening: covered once if your at risk. You're considered to be at risk if you have a family history of AAA or a male between the age of 65-75 who smoking at least 100 cigarettes in your lifetime.  Lung Cancer Screening: covers low dose CT scan once per year if you meet all of the following conditions: (1) Age 55-77; (2) No signs or symptoms of lung cancer; (3) Current smoker or have quit smoking within the last 15 years; (4) You have a tobacco smoking history of at least 20 pack years (packs per day x number of years you smoked); (5) You get a written order from a healthcare provider.  Glaucoma Screening: covered annually if you're considered high risk: (1) You have diabetes OR (2) Family history of glaucoma OR (3)  aged 50 and older OR (4)  American aged 65 and older  Osteoporosis Screening: covered every 2 years if you meet one of the following conditions: (1) Have a vertebral abnormality; (2) On glucocorticoid therapy for more than 3 months; (3) Have primary hyperparathyroidism; (4) On osteoporosis medications and need to assess response to drug therapy.  HIV Screening: covered annually if you're between the age of 15-65. Also covered annually if you are younger than 15 and older than 65 with risk factors for HIV infection. For pregnant patients, it is covered up to 3 times per pregnancy.    Immunizations:  Immunization Recommendations   Influenza Vaccine Annual influenza vaccination during flu season is recommended for all persons aged >= 6 months who do not have contraindications   Pneumococcal Vaccine   * Pneumococcal conjugate vaccine = PCV13 (Prevnar 13), PCV15 (Vaxneuvance),  PCV20 (Prevnar 20)  * Pneumococcal polysaccharide vaccine = PPSV23 (Pneumovax) Adults 19-65 yo with certain risk factors or if 65+ yo  If never received any pneumonia vaccine: recommend Prevnar 20 (PCV20)  Give PCV20 if previously received 1 dose of PCV13 or PPSV23   Hepatitis B Vaccine 3 dose series if at intermediate or high risk (ex: diabetes, end stage renal disease, liver disease)   Respiratory syncytial virus (RSV) Vaccine - COVERED BY MEDICARE PART D  * RSVPreF3 (Arexvy) CDC recommends that adults 60 years of age and older may receive a single dose of RSV vaccine using shared clinical decision-making (SCDM)   Tetanus (Td) Vaccine - COST NOT COVERED BY MEDICARE PART B Following completion of primary series, a booster dose should be given every 10 years to maintain immunity against tetanus. Td may also be given as tetanus wound prophylaxis.   Tdap Vaccine - COST NOT COVERED BY MEDICARE PART B Recommended at least once for all adults. For pregnant patients, recommended with each pregnancy.   Shingles Vaccine (Shingrix) - COST NOT COVERED BY MEDICARE PART B  2 shot series recommended in those 19 years and older who have or will have weakened immune systems or those 50 years and older     Health Maintenance Due:      Topic Date Due   • Colorectal Cancer Screening  11/07/2018   • Hepatitis C Screening  Completed     Immunizations Due:      Topic Date Due   • COVID-19 Vaccine (1) Never done     Advance Directives   What are advance directives?  Advance directives are legal documents that state your wishes and plans for medical care. These plans are made ahead of time in case you lose your ability to make decisions for yourself. Advance directives can apply to any medical decision, such as the treatments you want, and if you want to donate organs.   What are the types of advance directives?  There are many types of advance directives, and each state has rules about how to use them. You may choose a combination of any  of the following:  Living will:  This is a written record of the treatment you want. You can also choose which treatments you do not want, which to limit, and which to stop at a certain time. This includes surgery, medicine, IV fluid, and tube feedings.   Durable power of  for healthcare (DPAHC):  This is a written record that states who you want to make healthcare choices for you when you are unable to make them for yourself. This person, called a proxy, is usually a family member or a friend. You may choose more than 1 proxy.  Do not resuscitate (DNR) order:  A DNR order is used in case your heart stops beating or you stop breathing. It is a request not to have certain forms of treatment, such as CPR. A DNR order may be included in other types of advance directives.  Medical directive:  This covers the care that you want if you are in a coma, near death, or unable to make decisions for yourself. You can list the treatments you want for each condition. Treatment may include pain medicine, surgery, blood transfusions, dialysis, IV or tube feedings, and a ventilator (breathing machine).  Values history:  This document has questions about your views, beliefs, and how you feel and think about life. This information can help others choose the care that you would choose.  Why are advance directives important?  An advance directive helps you control your care. Although spoken wishes may be used, it is better to have your wishes written down. Spoken wishes can be misunderstood, or not followed. Treatments may be given even if you do not want them. An advance directive may make it easier for your family to make difficult choices about your care.   Weight Management   Why it is important to manage your weight:  Being overweight increases your risk of health conditions such as heart disease, high blood pressure, type 2 diabetes, and certain types of cancer. It can also increase your risk for osteoarthritis, sleep  apnea, and other respiratory problems. Aim for a slow, steady weight loss. Even a small amount of weight loss can lower your risk of health problems.  How to lose weight safely:  A safe and healthy way to lose weight is to eat fewer calories and get regular exercise. You can lose up about 1 pound a week by decreasing the number of calories you eat by 500 calories each day.   Healthy meal plan for weight management:  A healthy meal plan includes a variety of foods, contains fewer calories, and helps you stay healthy. A healthy meal plan includes the following:  Eat whole-grain foods more often.  A healthy meal plan should contain fiber. Fiber is the part of grains, fruits, and vegetables that is not broken down by your body. Whole-grain foods are healthy and provide extra fiber in your diet. Some examples of whole-grain foods are whole-wheat breads and pastas, oatmeal, brown rice, and bulgur.  Eat a variety of vegetables every day.  Include dark, leafy greens such as spinach, kale, donny greens, and mustard greens. Eat yellow and orange vegetables such as carrots, sweet potatoes, and winter squash.   Eat a variety of fruits every day.  Choose fresh or canned fruit (canned in its own juice or light syrup) instead of juice. Fruit juice has very little or no fiber.  Eat low-fat dairy foods.  Drink fat-free (skim) milk or 1% milk. Eat fat-free yogurt and low-fat cottage cheese. Try low-fat cheeses such as mozzarella and other reduced-fat cheeses.  Choose meat and other protein foods that are low in fat.  Choose beans or other legumes such as split peas or lentils. Choose fish, skinless poultry (chicken or turkey), or lean cuts of red meat (beef or pork). Before you cook meat or poultry, cut off any visible fat.   Use less fat and oil.  Try baking foods instead of frying them. Add less fat, such as margarine, sour cream, regular salad dressing and mayonnaise to foods. Eat fewer high-fat foods. Some examples of high-fat  foods include french fries, doughnuts, ice cream, and cakes.  Eat fewer sweets.  Limit foods and drinks that are high in sugar. This includes candy, cookies, regular soda, and sweetened drinks.  Exercise:  Exercise at least 30 minutes per day on most days of the week. Some examples of exercise include walking, biking, dancing, and swimming. You can also fit in more physical activity by taking the stairs instead of the elevator or parking farther away from stores. Ask your healthcare provider about the best exercise plan for you.      © Copyright [employer] 2018 Information is for End User's use only and may not be sold, redistributed or otherwise used for commercial purposes. All illustrations and images included in CareNotes® are the copyrighted property of A.D.A.M., Inc. or [employer]

## 2024-11-21 NOTE — PROGRESS NOTES
Name: Jose Cruz Alejandro      : 1945      MRN: [id number]  Encounter Provider: Dashawn Starkey MD  Encounter Date: 2024   Encounter department: [facility] CARE [facility]  :  Assessment & Plan  BMI 25.0-25.9,adult    Orders:    apixaban (ELIQUIS) 5 mg; Take 1 tablet (5 mg total) by mouth 2 (two) times a day    rosuvastatin (CRESTOR) 5 mg tablet; Take 1 tablet (5 mg total) by mouth daily    Meningioma (HCC)  Stable  Fu w neurosurgery    Orders:    apixaban (ELIQUIS) 5 mg; Take 1 tablet (5 mg total) by mouth 2 (two) times a day    Gastroesophageal reflux disease without esophagitis    Orders:    digoxin (LANOXIN) 0.125 mg tablet; Take 1 tablet (125 mcg total) by mouth daily    Essential hypertension, malignant    Orders:    nebivolol (Bystolic) 10 mg tablet; Take 1 tablet (10 mg total) by mouth daily    Hyperlipidemia associated with type 2 diabetes mellitus  (HCC)    Lab Results   Component Value Date    HGBA1C 5.8 2024       Orders:    rosuvastatin (CRESTOR) 5 mg tablet; Take 1 tablet (5 mg total) by mouth daily    UA (URINE) with reflex to Scope; Future    PSA Total, Diagnostic; Future    Magnesium; Future    TSH, 3rd generation; Future    T4, free; Future    CBC and differential; Future    Comprehensive metabolic panel; Future    Lipid panel; Future    Chronic atrial fibrillation (HCC)    Orders:    valsartan (DIOVAN) 40 mg tablet; Take 1 tablet (40 mg total) by mouth daily    UA (URINE) with reflex to Scope; Future    PSA Total, Diagnostic; Future    Magnesium; Future    TSH, 3rd generation; Future    T4, free; Future    CBC and differential; Future    Comprehensive metabolic panel; Future    Lipid panel; Future    Chronic combined systolic and diastolic heart failure (HCC)  Wt Readings from Last 3 Encounters:   24 72.1 kg (159 lb)   09/10/24 69.9 kg (154 lb)   24 70.3 kg (155 lb)               Orders:    valsartan (DIOVAN) 40 mg tablet; Take 1 tablet (40 mg total)  by mouth daily    PMR (polymyalgia rheumatica) (HCC)    Orders:    predniSONE 5 mg tablet; Take 1 tablet (5 mg total) by mouth daily with breakfast    Diclofenac Sodium (VOLTAREN) 1 %; Apply 2 g topically 4 (four) times a day    Encounter for general adult medical examination with abnormal findings  Done in Detail  RTC in 3 mos w :  Orders:    UA (URINE) with reflex to Scope; Future    PSA Total, Diagnostic; Future    Magnesium; Future    TSH, 3rd generation; Future    T4, free; Future    CBC and differential; Future    Comprehensive metabolic panel; Future    Lipid panel; Future    Enlarged prostate    Orders:    UA (URINE) with reflex to Scope; Future    PSA Total, Diagnostic; Future    PSA Total, Diagnostic; Future    Mild pulmonary hypertension (HCC)  Stable  Continue same         Peripheral vascular disease (HCC)  Stable  Fu w vascular         Persistent atrial fibrillation (HCC)  Stable  Continue same  Fu w cardiology         Chronic lymphocytic leukemia (HCC)  In remission  Continue same  Fu w hematology                History of Present Illness     79 Y O Man is here for AWV and Regular check Up, he has few symptoms, recent Blood work and med list reviewed,...      Review of Systems   Constitutional:  Negative for chills, fatigue and fever.   HENT:  Negative for congestion, facial swelling, sore throat, trouble swallowing and voice change.    Eyes:  Negative for pain, discharge and visual disturbance.   Respiratory:  Negative for cough, shortness of breath and wheezing.    Cardiovascular:  Negative for chest pain, palpitations and leg swelling.   Gastrointestinal:  Negative for abdominal pain, blood in stool, constipation, diarrhea and nausea.   Endocrine: Negative for polydipsia, polyphagia and polyuria.   Genitourinary:  Negative for difficulty urinating, hematuria and urgency.   Musculoskeletal:  Positive for arthralgias and myalgias.   Skin:  Negative for rash.   Neurological:  Negative for dizziness,  "tremors, weakness and headaches.   Hematological:  Negative for adenopathy. Does not bruise/bleed easily.   Psychiatric/Behavioral:  Negative for dysphoric mood, sleep disturbance and suicidal ideas.           Objective   /80 (BP Location: Left arm, Patient Position: Sitting, Cuff Size: Standard)   Pulse 88   Temp 98 °F (36.7 °C) (Tympanic)   Resp 16   Ht 5' 6\" (1.676 m)   Wt 72.1 kg (159 lb)   SpO2 98%   BMI 25.66 kg/m²      Physical Exam  Vitals and nursing note reviewed.   Constitutional:       General: He is not in acute distress.     Appearance: He is well-developed.   HENT:      Head: Normocephalic and atraumatic.      Right Ear: External ear normal.      Left Ear: External ear normal.   Eyes:      Conjunctiva/sclera: Conjunctivae normal.      Pupils: Pupils are equal, round, and reactive to light.   Neck:      Thyroid: No thyromegaly.      Trachea: No tracheal deviation.   Cardiovascular:      Rate and Rhythm: Normal rate and regular rhythm.      Heart sounds: Normal heart sounds. No murmur heard.     No friction rub.   Pulmonary:      Effort: Pulmonary effort is normal. No respiratory distress.      Breath sounds: Normal breath sounds. No wheezing.   Abdominal:      General: Bowel sounds are normal. There is no distension.      Palpations: Abdomen is soft.      Tenderness: There is no abdominal tenderness.   Musculoskeletal:         General: Tenderness present. No swelling or deformity. Normal range of motion.      Cervical back: Neck supple.      Comments: Mostly shoulders, recently   Skin:     General: Skin is warm and dry.      Capillary Refill: Capillary refill takes less than 2 seconds.      Findings: No erythema or rash.   Neurological:      Mental Status: He is alert and oriented to person, place, and time.      Cranial Nerves: No cranial nerve deficit.      Coordination: Coordination normal.      Deep Tendon Reflexes: Reflexes normal.   Psychiatric:         Mood and Affect: Mood normal.  "        Behavior: Behavior normal.

## 2024-11-21 NOTE — PROGRESS NOTES
Name: Jose Cruz Alejandro      : 1945      MRN: [id number]  Encounter Provider: Dashawn Starkey MD  Encounter Date: 2024   Encounter department: [facility] CARE [facility]    Assessment & Plan       Preventive health issues were discussed with patient, and age appropriate screening tests were ordered as noted in patient's After Visit Summary. Personalized health advice and appropriate referrals for health education or preventive services given if needed, as noted in patient's After Visit Summary.    History of Present Illness     HPI   Patient Care Team:  Dashawn Starkey MD as PCP - General (Internal Medicine)    Review of Systems  Medical History Reviewed by provider this encounter:       Annual Wellness Visit Questionnaire   Jose Cruz is here for his Subsequent Wellness visit. Last Medicare Wellness visit information reviewed, patient interviewed, no change since last AWV.     Health Risk Assessment:   Patient rates overall health as good. Patient feels that their physical health rating is same. Eyesight was rated as same. Hearing was rated as slightly worse. Patient feels that their emotional and mental health rating is same. Patients states they are never, rarely angry. Patient states they are never, rarely unusually tired/fatigued. Pain experienced in the last 7 days has been a lot. Patient's pain rating has been 1/10. Patient states that he has experienced no weight loss or gain in last 6 months.     Depression Screening:   PHQ-2 Score: 0      Fall Risk Screening:   In the past year, patient has experienced: no history of falling in past year      Home Safety:  Patient does not have trouble with stairs inside or outside of their home. Patient has working smoke alarms and has working carbon monoxide detector. Home safety hazards include: none.     Nutrition:   Current diet is Regular.     Medications:   Patient is not currently taking any over-the-counter supplements. Patient is able to  manage medications.     Activities of Daily Living (ADLs)/Instrumental Activities of Daily Living (IADLs):   Walk and transfer into and out of bed and chair?: Yes  Dress and groom yourself?: Yes    Bathe or shower yourself?: Yes    Feed yourself? Yes  Do your laundry/housekeeping?: Yes  Manage your money, pay your bills and track your expenses?: Yes  Make your own meals?: Yes    Do your own shopping?: Yes    Previous Hospitalizations:   Any hospitalizations or ED visits within the last 12 months?: No      Advance Care Planning:   Living will: Yes    Durable POA for healthcare: Yes    Advanced directive: Yes    Advanced directive counseling given: Yes    ACP document given: Yes    Patient declined ACP directive: Yes    End of Life Decisions reviewed with patient: Yes    Provider agrees with end of life decisions: Yes      PREVENTIVE SCREENINGS      Cardiovascular Screening:    General: Screening Not Indicated, History Lipid Disorder and Risks and Benefits Discussed      Diabetes Screening:     General: Screening Not Indicated, History Diabetes, Risks and Benefits Discussed and Screening Current      Colorectal Cancer Screening:     General: Screening Current and Risks and Benefits Discussed      Prostate Cancer Screening:    General: Screening Not Indicated and Risks and Benefits Discussed      Osteoporosis Screening:    General: Screening Not Indicated, History Osteoporosis and Risks and Benefits Discussed      Abdominal Aortic Aneurysm (AAA) Screening:    Risk factors include: age between 65-76 yo        General: Risks and Benefits Discussed      Lung Cancer Screening:     General: Screening Not Indicated and Risks and Benefits Discussed      Hepatitis C Screening:    General: Screening Current and Risks and Benefits Discussed    Screening, Brief Intervention, and Referral to Treatment (SBIRT)    Screening      Single Item Drug Screening:  How often have you used an illegal drug (including marijuana) or a  "prescription medication for non-medical reasons in the past year? never    Single Item Drug Screen Score: 0  Interpretation: Negative screen for possible drug use disorder    Other Counseling Topics:   Car/seat belt/driving safety, skin self-exam, sunscreen and calcium and vitamin D intake and regular weightbearing exercise.     Social Drivers of Health     Financial Resource Strain: Low Risk  (9/26/2023)    Overall Financial Resource Strain (CARDIA)     Difficulty of Paying Living Expenses: Not hard at all   Food Insecurity: No Food Insecurity (5/13/2021)    Hunger Vital Sign     Worried About Running Out of Food in the Last Year: Never true     Ran Out of Food in the Last Year: Never true   Transportation Needs: No Transportation Needs (9/26/2023)    PRAPARE - Transportation     Lack of Transportation (Medical): No     Lack of Transportation (Non-Medical): No     No results found.    Objective   Resp 16   Ht 5' 6\" (1.676 m)   BMI 24.86 kg/m²     Physical Exam    "

## 2024-11-21 NOTE — ASSESSMENT & PLAN NOTE
Stable  Fu w neurosurgery    Orders:    apixaban (ELIQUIS) 5 mg; Take 1 tablet (5 mg total) by mouth 2 (two) times a day

## 2025-01-02 DIAGNOSIS — M35.3 PMR (POLYMYALGIA RHEUMATICA) (HCC): ICD-10-CM

## 2025-01-02 RX ORDER — PREDNISONE 5 MG/1
5 TABLET ORAL
Qty: 30 TABLET | Refills: 1 | Status: SHIPPED | OUTPATIENT
Start: 2025-01-02

## 2025-01-07 DIAGNOSIS — J06.9 ACUTE URI: Primary | ICD-10-CM

## 2025-01-07 RX ORDER — AMOXICILLIN 500 MG/1
500 CAPSULE ORAL EVERY 8 HOURS SCHEDULED
Qty: 30 CAPSULE | Refills: 0 | Status: SHIPPED | OUTPATIENT
Start: 2025-01-07 | End: 2025-01-17

## 2025-01-07 RX ORDER — OFLOXACIN 3 MG/ML
5 SOLUTION AURICULAR (OTIC) 2 TIMES DAILY
Qty: 15 ML | Refills: 0 | Status: SHIPPED | OUTPATIENT
Start: 2025-01-07

## 2025-01-16 DIAGNOSIS — E11.69 HYPERLIPIDEMIA ASSOCIATED WITH TYPE 2 DIABETES MELLITUS  (HCC): ICD-10-CM

## 2025-01-16 DIAGNOSIS — I10 ESSENTIAL HYPERTENSION, MALIGNANT: ICD-10-CM

## 2025-01-16 DIAGNOSIS — E78.5 HYPERLIPIDEMIA ASSOCIATED WITH TYPE 2 DIABETES MELLITUS  (HCC): ICD-10-CM

## 2025-01-16 DIAGNOSIS — K21.9 GASTROESOPHAGEAL REFLUX DISEASE WITHOUT ESOPHAGITIS: ICD-10-CM

## 2025-01-16 RX ORDER — ROSUVASTATIN CALCIUM 5 MG/1
5 TABLET, COATED ORAL DAILY
Qty: 90 TABLET | Refills: 0 | OUTPATIENT
Start: 2025-01-16

## 2025-01-16 RX ORDER — DIGOXIN 125 MCG
125 TABLET ORAL DAILY
Qty: 45 TABLET | Refills: 0 | OUTPATIENT
Start: 2025-01-16

## 2025-01-16 RX ORDER — NEBIVOLOL 10 MG/1
10 TABLET ORAL DAILY
Qty: 90 TABLET | Refills: 0 | OUTPATIENT
Start: 2025-01-16

## 2025-02-21 ENCOUNTER — RA CDI HCC (OUTPATIENT)
Dept: OTHER | Facility: HOSPITAL | Age: 80
End: 2025-02-21

## 2025-02-27 ENCOUNTER — OFFICE VISIT (OUTPATIENT)
Dept: FAMILY MEDICINE CLINIC | Facility: CLINIC | Age: 80
End: 2025-02-27
Payer: MEDICARE

## 2025-02-27 VITALS
HEIGHT: 66 IN | OXYGEN SATURATION: 98 % | HEART RATE: 70 BPM | RESPIRATION RATE: 16 BRPM | WEIGHT: 160 LBS | SYSTOLIC BLOOD PRESSURE: 130 MMHG | TEMPERATURE: 97.8 F | BODY MASS INDEX: 25.71 KG/M2 | DIASTOLIC BLOOD PRESSURE: 80 MMHG

## 2025-02-27 DIAGNOSIS — I10 PRIMARY HYPERTENSION: ICD-10-CM

## 2025-02-27 DIAGNOSIS — N40.0 ENLARGED PROSTATE: ICD-10-CM

## 2025-02-27 DIAGNOSIS — M19.90 ARTHRITIS: Primary | ICD-10-CM

## 2025-02-27 DIAGNOSIS — M81.8 IDIOPATHIC OSTEOPOROSIS: ICD-10-CM

## 2025-02-27 DIAGNOSIS — Z12.11 SCREEN FOR COLON CANCER: ICD-10-CM

## 2025-02-27 DIAGNOSIS — R73.09 ELEVATED HEMOGLOBIN A1C: ICD-10-CM

## 2025-02-27 DIAGNOSIS — R01.1 HEART MURMUR: ICD-10-CM

## 2025-02-27 PROBLEM — I27.20 MILD PULMONARY HYPERTENSION (HCC): Status: RESOLVED | Noted: 2020-01-28 | Resolved: 2025-02-27

## 2025-02-27 PROBLEM — I48.19 PERSISTENT ATRIAL FIBRILLATION (HCC): Status: RESOLVED | Noted: 2020-01-28 | Resolved: 2025-02-27

## 2025-02-27 LAB — SL AMB POCT HEMOGLOBIN AIC: 5.8 (ref ?–6.5)

## 2025-02-27 PROCEDURE — 83036 HEMOGLOBIN GLYCOSYLATED A1C: CPT | Performed by: INTERNAL MEDICINE

## 2025-02-27 PROCEDURE — 99214 OFFICE O/P EST MOD 30 MIN: CPT | Performed by: INTERNAL MEDICINE

## 2025-02-27 NOTE — ASSESSMENT & PLAN NOTE
Orders:    Quantiferon TB Gold Plus Assay; Future    Vitamin B12; Future    Vitamin D 25 hydroxy; Future    TSH, 3rd generation; Future    T4, free; Future    CBC and differential; Future    Comprehensive metabolic panel; Future    Lipid panel; Future

## 2025-02-27 NOTE — PATIENT INSTRUCTIONS

## 2025-02-27 NOTE — PROGRESS NOTES
Name: Jose Cruz Alejandro      : 1945      MRN: [id number]  Encounter Provider: Dashawn Starkey MD  Encounter Date: 2025   Encounter department: [facility]  :  Assessment & Plan  Screen for colon cancer    Orders:    Ambulatory referral to Gastroenterology; Future    Cologuard    Vitamin B12; Future    Vitamin D 25 hydroxy; Future    TSH, 3rd generation; Future    T4, free; Future    CBC and differential; Future    Comprehensive metabolic panel; Future    Lipid panel; Future    Idiopathic osteoporosis    Orders:    DXA bone density spine hip and pelvis; Future    Quantiferon TB Gold Plus Assay; Future    Vitamin B12; Future    Vitamin D 25 hydroxy; Future    TSH, 3rd generation; Future    T4, free; Future    CBC and differential; Future    Comprehensive metabolic panel; Future    Lipid panel; Future    Primary hypertension    Orders:    Quantiferon TB Gold Plus Assay; Future    Vitamin B12; Future    Vitamin D 25 hydroxy; Future    TSH, 3rd generation; Future    T4, free; Future    CBC and differential; Future    Comprehensive metabolic panel; Future    Lipid panel; Future    Arthritis  Moist heat  Home p.t.  Rtc in 3 mos w ;  Orders:    Quantiferon TB Gold Plus Assay; Future    Vitamin B12; Future    Vitamin D 25 hydroxy; Future    TSH, 3rd generation; Future    T4, free; Future    CBC and differential; Future    Comprehensive metabolic panel; Future    Lipid panel; Future    Enlarged prostate    Orders:    UA (URINE) with reflex to Scope; Future    Magnesium; Future    PSA Total, Diagnostic; Future    Vitamin B12; Future    Vitamin D 25 hydroxy; Future    TSH, 3rd generation; Future    T4, free; Future    CBC and differential; Future    Comprehensive metabolic panel; Future    Lipid panel; Future    Elevated hemoglobin A1c  Life style mod  Rtc in 3 mos w ;  Orders:    POCT hemoglobin A1c    Quantiferon TB Gold Plus Assay; Future    Vitamin B12; Future    Vitamin D 25  "hydroxy; Future    TSH, 3rd generation; Future    T4, free; Future    CBC and differential; Future    Comprehensive metabolic panel; Future    Lipid panel; Future    Heart murmur  Rtc in 3 mos w ;  Orders:    Echo complete w/ contrast if indicated; Future    Vitamin B12; Future    Vitamin D 25 hydroxy; Future    TSH, 3rd generation; Future    T4, free; Future    CBC and differential; Future    Comprehensive metabolic panel; Future    Lipid panel; Future           History of Present Illness   79 y o man is here for regular check up, he has arthritis, recent blood work and med list reviewed,....      Review of Systems   Constitutional:  Negative for chills, fatigue and fever.   HENT:  Negative for congestion, facial swelling, sore throat, trouble swallowing and voice change.    Eyes:  Negative for pain, discharge and visual disturbance.   Respiratory:  Negative for cough, shortness of breath and wheezing.    Cardiovascular:  Negative for chest pain, palpitations and leg swelling.   Gastrointestinal:  Negative for abdominal pain, blood in stool, constipation, diarrhea and nausea.   Endocrine: Negative for polydipsia, polyphagia and polyuria.   Genitourinary:  Negative for difficulty urinating, hematuria and urgency.   Musculoskeletal:  Positive for arthralgias, back pain and myalgias.   Skin:  Negative for rash.   Neurological:  Negative for dizziness, tremors, weakness and headaches.   Hematological:  Negative for adenopathy. Does not bruise/bleed easily.   Psychiatric/Behavioral:  Negative for dysphoric mood, sleep disturbance and suicidal ideas.        Objective   /80 (BP Location: Left arm, Patient Position: Sitting, Cuff Size: Standard)   Pulse 70   Temp 97.8 °F (36.6 °C) (Tympanic)   Resp 16   Ht 5' 6\" (1.676 m)   Wt 72.6 kg (160 lb)   SpO2 98%   BMI 25.82 kg/m²      Physical Exam  Vitals and nursing note reviewed.   Constitutional:       General: He is not in acute distress.     Appearance: He is " well-developed.   HENT:      Head: Normocephalic and atraumatic.      Right Ear: External ear normal.      Left Ear: External ear normal.   Eyes:      Conjunctiva/sclera: Conjunctivae normal.      Pupils: Pupils are equal, round, and reactive to light.   Neck:      Thyroid: No thyromegaly.      Trachea: No tracheal deviation.   Cardiovascular:      Rate and Rhythm: Normal rate and regular rhythm.      Heart sounds: Murmur heard.      No friction rub.   Pulmonary:      Effort: Pulmonary effort is normal. No respiratory distress.      Breath sounds: Normal breath sounds. No wheezing.   Abdominal:      General: Bowel sounds are normal. There is no distension.      Palpations: Abdomen is soft.      Tenderness: There is no abdominal tenderness.   Musculoskeletal:         General: Tenderness present. No swelling or deformity. Normal range of motion.      Cervical back: Neck supple.   Skin:     General: Skin is warm and dry.      Capillary Refill: Capillary refill takes less than 2 seconds.      Findings: No erythema or rash.   Neurological:      Mental Status: He is alert and oriented to person, place, and time.      Cranial Nerves: No cranial nerve deficit.      Coordination: Coordination normal.      Deep Tendon Reflexes: Reflexes normal.   Psychiatric:         Mood and Affect: Mood normal.         Behavior: Behavior normal.

## 2025-03-01 DIAGNOSIS — I10 ESSENTIAL HYPERTENSION, MALIGNANT: ICD-10-CM

## 2025-03-01 DIAGNOSIS — I50.42 CHRONIC COMBINED SYSTOLIC AND DIASTOLIC HEART FAILURE (HCC): ICD-10-CM

## 2025-03-01 DIAGNOSIS — I48.20 CHRONIC ATRIAL FIBRILLATION (HCC): ICD-10-CM

## 2025-03-01 RX ORDER — VALSARTAN 40 MG/1
40 TABLET ORAL DAILY
Qty: 180 TABLET | Refills: 3 | Status: SHIPPED | OUTPATIENT
Start: 2025-03-01

## 2025-03-01 RX ORDER — NEBIVOLOL 10 MG/1
10 TABLET ORAL DAILY
Qty: 90 TABLET | Refills: 1 | Status: SHIPPED | OUTPATIENT
Start: 2025-03-01

## 2025-03-07 DIAGNOSIS — M35.3 PMR (POLYMYALGIA RHEUMATICA) (HCC): ICD-10-CM

## 2025-03-07 RX ORDER — PREDNISONE 5 MG/1
5 TABLET ORAL
Qty: 30 TABLET | Refills: 1 | OUTPATIENT
Start: 2025-03-07

## 2025-03-10 DIAGNOSIS — M35.3 PMR (POLYMYALGIA RHEUMATICA) (HCC): ICD-10-CM

## 2025-03-10 DIAGNOSIS — J06.9 ACUTE URI: Primary | ICD-10-CM

## 2025-03-10 RX ORDER — CLINDAMYCIN HYDROCHLORIDE 300 MG/1
300 CAPSULE ORAL 2 TIMES DAILY WITH MEALS
Qty: 20 CAPSULE | Refills: 0 | Status: SHIPPED | OUTPATIENT
Start: 2025-03-10 | End: 2025-03-20

## 2025-03-10 RX ORDER — GUAIFENESIN/DEXTROMETHORPHAN 100-10MG/5
5 SYRUP ORAL 3 TIMES DAILY PRN
Qty: 118 ML | Refills: 1 | Status: SHIPPED | OUTPATIENT
Start: 2025-03-10

## 2025-03-10 RX ORDER — PREDNISONE 5 MG/1
5 TABLET ORAL
Qty: 30 TABLET | Refills: 1 | Status: SHIPPED | OUTPATIENT
Start: 2025-03-10

## 2025-03-17 DIAGNOSIS — K21.9 GASTROESOPHAGEAL REFLUX DISEASE WITHOUT ESOPHAGITIS: ICD-10-CM

## 2025-03-17 NOTE — TELEPHONE ENCOUNTER
Medication: Dogoxin    Dose/Frequency: 0.125 mg     Quantity: 30 w/refills    Pharmacy: [facility]    Office:   [] PCP/Provider -   [x] Speciality/Provider -     Does the patient have enough for 3 days?   [] Yes   [x] No - Send as HP to POD

## 2025-03-18 RX ORDER — DIGOXIN 125 MCG
125 TABLET ORAL DAILY
Qty: 45 TABLET | Refills: 0 | Status: SHIPPED | OUTPATIENT
Start: 2025-03-18

## 2025-03-20 DIAGNOSIS — N40.0 ENLARGED PROSTATE: ICD-10-CM

## 2025-03-20 RX ORDER — TAMSULOSIN HYDROCHLORIDE 0.4 MG/1
0.4 CAPSULE ORAL
Qty: 90 CAPSULE | Refills: 1 | Status: SHIPPED | OUTPATIENT
Start: 2025-03-20

## 2025-03-21 ENCOUNTER — TELEPHONE (OUTPATIENT)
Dept: FAMILY MEDICINE CLINIC | Facility: CLINIC | Age: 80
End: 2025-03-21

## 2025-03-21 NOTE — TELEPHONE ENCOUNTER
L/m regarding apt on [address].   is out that week so appointment was r/s to June 11.  If this does not work, call to r/s appointment.

## 2025-04-01 DIAGNOSIS — G47.09 OTHER INSOMNIA: Primary | ICD-10-CM

## 2025-04-01 RX ORDER — HYDROXYZINE HYDROCHLORIDE 25 MG/1
25 TABLET, FILM COATED ORAL
Qty: 30 TABLET | Refills: 3 | Status: SHIPPED | OUTPATIENT
Start: 2025-04-01

## 2025-04-11 DIAGNOSIS — E11.69 HYPERLIPIDEMIA ASSOCIATED WITH TYPE 2 DIABETES MELLITUS  (HCC): ICD-10-CM

## 2025-04-11 DIAGNOSIS — E78.5 HYPERLIPIDEMIA ASSOCIATED WITH TYPE 2 DIABETES MELLITUS  (HCC): ICD-10-CM

## 2025-04-11 RX ORDER — ROSUVASTATIN CALCIUM 5 MG/1
5 TABLET, COATED ORAL DAILY
Qty: 90 TABLET | Refills: 0 | Status: SHIPPED | OUTPATIENT
Start: 2025-04-11

## 2025-04-19 DIAGNOSIS — K21.9 GASTROESOPHAGEAL REFLUX DISEASE WITHOUT ESOPHAGITIS: ICD-10-CM

## 2025-04-21 RX ORDER — DIGOXIN 125 MCG
125 TABLET ORAL DAILY
Qty: 45 TABLET | Refills: 0 | Status: SHIPPED | OUTPATIENT
Start: 2025-04-21

## 2025-05-18 ENCOUNTER — HOSPITAL ENCOUNTER (EMERGENCY)
Facility: HOSPITAL | Age: 80
Discharge: HOME | End: 2025-05-18
Attending: EMERGENCY MEDICINE
Payer: MEDICARE

## 2025-05-18 VITALS
SYSTOLIC BLOOD PRESSURE: 136 MMHG | RESPIRATION RATE: 16 BRPM | DIASTOLIC BLOOD PRESSURE: 84 MMHG | TEMPERATURE: 97.8 F | BODY MASS INDEX: 27.12 KG/M2 | HEART RATE: 87 BPM | OXYGEN SATURATION: 98 % | WEIGHT: 168 LBS

## 2025-05-18 DIAGNOSIS — K52.9 GASTROENTERITIS: Primary | ICD-10-CM

## 2025-05-18 LAB
ALBUMIN SERPL BCG-MCNC: 4.5 G/DL (ref 3.5–5)
ALP SERPL-CCNC: 98 U/L (ref 34–104)
ALT SERPL W P-5'-P-CCNC: 21 U/L (ref 7–52)
ANION GAP SERPL CALCULATED.3IONS-SCNC: 9 MMOL/L (ref 4–13)
ANISOCYTOSIS BLD QL SMEAR: PRESENT
AST SERPL W P-5'-P-CCNC: 24 U/L (ref 13–39)
ATRIAL RATE: 267 BPM
BASOPHILS # BLD MANUAL: 0 THOUSAND/UL (ref 0–0.1)
BASOPHILS NFR MAR MANUAL: 0 % (ref 0–1)
BILIRUB SERPL-MCNC: 0.71 MG/DL (ref 0.2–1)
BUN SERPL-MCNC: 23 MG/DL (ref 5–25)
CALCIUM SERPL-MCNC: 9.3 MG/DL (ref 8.4–10.2)
CARDIAC TROPONIN I PNL SERPL HS: 5 NG/L (ref ?–50)
CHLORIDE SERPL-SCNC: 103 MMOL/L (ref 96–108)
CO2 SERPL-SCNC: 25 MMOL/L (ref 21–32)
CREAT SERPL-MCNC: 1.14 MG/DL (ref 0.6–1.3)
EOSINOPHIL # BLD MANUAL: 0 THOUSAND/UL (ref 0–0.4)
EOSINOPHIL NFR BLD MANUAL: 0 % (ref 0–6)
ERYTHROCYTE [DISTWIDTH] IN BLOOD BY AUTOMATED COUNT: 13.8 % (ref 11.6–15.1)
GFR SERPL CREATININE-BSD FRML MDRD: 61 ML/MIN/1.73SQ M
GLUCOSE SERPL-MCNC: 124 MG/DL (ref 65–140)
HCT VFR BLD AUTO: 50.8 % (ref 36.5–49.3)
HGB BLD-MCNC: 17.4 G/DL (ref 12–17)
LIPASE SERPL-CCNC: 75 U/L (ref 11–82)
LYMPHOCYTES # BLD AUTO: 0.26 THOUSAND/UL (ref 0.6–4.47)
LYMPHOCYTES # BLD AUTO: 1 % (ref 14–44)
MACROCYTES BLD QL AUTO: PRESENT
MAGNESIUM SERPL-MCNC: 1.9 MG/DL (ref 1.9–2.7)
MCH RBC QN AUTO: 31.1 PG (ref 26.8–34.3)
MCHC RBC AUTO-ENTMCNC: 34.3 G/DL (ref 31.4–37.4)
MCV RBC AUTO: 91 FL (ref 82–98)
MONOCYTES # BLD AUTO: 0.52 THOUSAND/UL (ref 0–1.22)
MONOCYTES NFR BLD: 4 % (ref 4–12)
MYELOCYTE ABSOLUTE CT: 0.13 THOUSAND/UL (ref 0–0.1)
MYELOCYTES NFR BLD MANUAL: 1 % (ref 0–1)
NEUTROPHILS # BLD MANUAL: 11.98 THOUSAND/UL (ref 1.85–7.62)
NEUTS BAND NFR BLD MANUAL: 2 % (ref 0–8)
NEUTS SEG NFR BLD AUTO: 91 % (ref 43–75)
PLATELET # BLD AUTO: 254 THOUSANDS/UL (ref 149–390)
PLATELET BLD QL SMEAR: ADEQUATE
PMV BLD AUTO: 9.7 FL (ref 8.9–12.7)
POTASSIUM SERPL-SCNC: 4.6 MMOL/L (ref 3.5–5.3)
PROT SERPL-MCNC: 7.3 G/DL (ref 6.4–8.4)
QRS AXIS: 39 DEGREES
QRSD INTERVAL: 108 MS
QT INTERVAL: 338 MS
QTC INTERVAL: 413 MS
RBC # BLD AUTO: 5.6 MILLION/UL (ref 3.88–5.62)
RBC MORPH BLD: PRESENT
SODIUM SERPL-SCNC: 137 MMOL/L (ref 135–147)
T WAVE AXIS: 53 DEGREES
VARIANT LYMPHS # BLD AUTO: 1 %
VENTRICULAR RATE: 90 BPM
WBC # BLD AUTO: 12.88 THOUSAND/UL (ref 4.31–10.16)

## 2025-05-18 PROCEDURE — 96361 HYDRATE IV INFUSION ADD-ON: CPT

## 2025-05-18 PROCEDURE — 80053 COMPREHEN METABOLIC PANEL: CPT | Performed by: EMERGENCY MEDICINE

## 2025-05-18 PROCEDURE — 84484 ASSAY OF TROPONIN QUANT: CPT | Performed by: EMERGENCY MEDICINE

## 2025-05-18 PROCEDURE — 36415 COLL VENOUS BLD VENIPUNCTURE: CPT | Performed by: EMERGENCY MEDICINE

## 2025-05-18 PROCEDURE — 85007 BL SMEAR W/DIFF WBC COUNT: CPT | Performed by: EMERGENCY MEDICINE

## 2025-05-18 PROCEDURE — 99285 EMERGENCY DEPT VISIT HI MDM: CPT | Performed by: EMERGENCY MEDICINE

## 2025-05-18 PROCEDURE — 96374 THER/PROPH/DIAG INJ IV PUSH: CPT

## 2025-05-18 PROCEDURE — 85027 COMPLETE CBC AUTOMATED: CPT | Performed by: EMERGENCY MEDICINE

## 2025-05-18 PROCEDURE — 83690 ASSAY OF LIPASE: CPT | Performed by: EMERGENCY MEDICINE

## 2025-05-18 PROCEDURE — 83735 ASSAY OF MAGNESIUM: CPT | Performed by: EMERGENCY MEDICINE

## 2025-05-18 PROCEDURE — 99284 EMERGENCY DEPT VISIT MOD MDM: CPT

## 2025-05-18 PROCEDURE — 93010 ELECTROCARDIOGRAM REPORT: CPT | Performed by: STUDENT IN AN ORGANIZED HEALTH CARE EDUCATION/TRAINING PROGRAM

## 2025-05-18 PROCEDURE — 93005 ELECTROCARDIOGRAM TRACING: CPT

## 2025-05-18 RX ORDER — ONDANSETRON 2 MG/ML
4 INJECTION INTRAMUSCULAR; INTRAVENOUS ONCE
Status: COMPLETED | OUTPATIENT
Start: 2025-05-18 | End: 2025-05-18

## 2025-05-18 RX ORDER — ONDANSETRON 4 MG/1
4 TABLET, ORALLY DISINTEGRATING ORAL EVERY 8 HOURS PRN
Qty: 10 TABLET | Refills: 0 | Status: SHIPPED | OUTPATIENT
Start: 2025-05-18

## 2025-05-18 RX ADMIN — ONDANSETRON 4 MG: 2 INJECTION INTRAMUSCULAR; INTRAVENOUS at 14:23

## 2025-05-18 RX ADMIN — SODIUM CHLORIDE 500 ML: 0.9 INJECTION, SOLUTION INTRAVENOUS at 14:09

## 2025-05-18 NOTE — ED PROVIDER NOTES
Time reflects when diagnosis was documented in both MDM as applicable and the Disposition within this note       Time User Action Codes Description Comment    5/18/2025  2:56 PM Anita Bhatia Add [K52.9] Gastroenteritis           ED Disposition       ED Disposition   Discharge    Condition   Stable    Date/Time   Sun May 18, 2025  2:56 PM    Comment   Jose Cruz Alejandro discharge to home/self care.                   Assessment & Plan       Medical Decision Making  80 yo M with N/V/D- sx management/IVF, CBC to assess for leukocytosis/anemia, CMP to assess for elevated LFTs/ESTUARDO/electrolyte abn, lipase to r/o pancreatitis, EKG/trop to r/o ischemia    Sx improved in ER, tolerated po and ambulated without difficulty  Discharged to home with family, Zofran PRN  Close return precautions discussed and pt expressed comfort and understanding with plan     Problems Addressed:  Gastroenteritis: acute illness or injury    Amount and/or Complexity of Data Reviewed  External Data Reviewed: labs and ECG.  Labs: ordered. Decision-making details documented in ED Course.    Risk  Prescription drug management.        ED Course as of 05/18/25 1649   [place] May 18, 2025   1414 EKG independently interpreted by myself:  Afib @ 90 bpm, normal axis, normal intervals qtc 413, nonspecific st changes   1456 hs TnI 0hr: 5  No CP   1456 Pt reports feeling better, tolerated po. Repeat abdominal exam benign/nontender   1505 Pt ambulated without lightheadedness/symptoms/concerns       Medications   sodium chloride 0.9 % bolus 500 mL (0 mL Intravenous Stopped 5/18/25 1511)   ondansetron (ZOFRAN) injection 4 mg (4 mg Intravenous Given 5/18/25 1423)       ED Risk Strat Scores                    No data recorded                            History of Present Illness       Chief Complaint   Patient presents with    Vomiting     With N/V/D and leg cramps since this am, grandchildren recently had flu       Past Medical History:   Diagnosis Date    Atrial  fibrillation (HCC)     CLL (chronic lymphocytic leukemia) (HCC)     HLD (hyperlipidemia)     HTN (hypertension)     Hypertension     Leukemia (HCC)     Meningioma (HCC)     Osteoarthritis 9/26/2012    PAD (peripheral artery disease) (HCC)       Past Surgical History:   Procedure Laterality Date    CARDIOVERSION (CA/MI ONLY)  6/12/2020         NO PAST SURGERIES      MA ECHO TRANSESOPHAG R-T 2D W/PRB IMG ACQUISJ I&R  6/12/2020           Family History   Problem Relation Age of Onset    Diabetes Mother     Heart disease Father       Social History[1]   E-Cigarette/Vaping    E-Cigarette Use Never User       E-Cigarette/Vaping Substances    Nicotine No     THC No     CBD No     Flavoring No     Other No     Unknown No       I have reviewed and agree with the history as documented.     80 yo M presenting for evaluation of N/V/D.    Sx started around 10 am today, (4 hours PTA). Reports numerous episodes of vomiting and loose stools.  +lightheaded at home  Family at home sick with similar    Denies fevers, HA, neck pain, CP, SOB, cough/URI sx, back pain, abdominal pain, syncope, urinary sx        Review of Systems        Objective       ED Triage Vitals [05/18/25 1349]   Temperature Pulse Blood Pressure Respirations SpO2 Patient Position - Orthostatic VS   97.8 °F (36.6 °C) 90 125/85 18 97 % Sitting      Temp Source Heart Rate Source BP Location FiO2 (%) Pain Score    Oral Monitor Right arm -- 7      Vitals      Date and Time Temp Pulse SpO2 Resp BP Pain Score FACES Pain Rating User   05/18/25 1530 -- 87 98 % 16 136/84 -- -- LAP   05/18/25 1349 97.8 °F (36.6 °C) 90 97 % 18 125/85 7 -- LAP            Physical Exam  Vitals and nursing note reviewed.   Constitutional:       General: He is not in acute distress.     Appearance: Normal appearance. He is well-developed.   HENT:      Head: Normocephalic and atraumatic.      Nose: Nose normal.     Eyes:      Extraocular Movements: Extraocular movements intact.       Conjunctiva/sclera: Conjunctivae normal.       Cardiovascular:      Rate and Rhythm: Normal rate. Rhythm irregular.      Heart sounds: Normal heart sounds.   Pulmonary:      Effort: Pulmonary effort is normal. No respiratory distress.      Breath sounds: Normal breath sounds. No stridor. No wheezing or rales.   Chest:      Chest wall: No tenderness.   Abdominal:      General: There is no distension.      Palpations: Abdomen is soft.      Tenderness: There is no abdominal tenderness. There is no guarding or rebound.     Musculoskeletal:         General: No swelling, tenderness or deformity.      Cervical back: Normal range of motion and neck supple.     Skin:     General: Skin is warm and dry.      Findings: No rash.     Neurological:      General: No focal deficit present.      Mental Status: He is alert and oriented to person, place, and time.      Motor: No abnormal muscle tone.      Coordination: Coordination normal.     Psychiatric:         Thought Content: Thought content normal.         Judgment: Judgment normal.         Results Reviewed       Procedure Component Value Units Date/Time    Manual Differential(PHLEBS Do Not Order) [[id number]]  (Abnormal) Collected: 05/18/25 1410    Lab Status: Final result Specimen: Blood from Arm, Left Updated: 05/18/25 1525     Segmented % 91 %      Bands % 2 %      Lymphocytes % 1 %      Monocytes % 4 %      Eosinophils % 0 %      Basophils % 0 %      Myelocytes % 1 %      Atypical Lymphocytes % 1 %      Absolute Neutrophils 11.98 Thousand/uL      Absolute Lymphocytes 0.26 Thousand/uL      Absolute Monocytes 0.52 Thousand/uL      Absolute Eosinophils 0.00 Thousand/uL      Absolute Basophils 0.00 Thousand/uL      Absolute Myelocytes 0.13 Thousand/uL      Total Counted --     RBC Morphology Present     Platelet Estimate Adequate     Anisocytosis Present     Macrocytes Present    HS Troponin 0hr (reflex protocol) [[id number]]  (Normal) Collected: 05/18/25 1426    Lab Status: Final  result Specimen: Blood from Arm, Left Updated: 05/18/25 1456     hs TnI 0hr 5 ng/L     Comprehensive metabolic panel [[id number]] Collected: 05/18/25 1410    Lab Status: Final result Specimen: Blood from Arm, Left Updated: 05/18/25 1432     Sodium 137 mmol/L      Potassium 4.6 mmol/L      Chloride 103 mmol/L      CO2 25 mmol/L      ANION GAP 9 mmol/L      BUN 23 mg/dL      Creatinine 1.14 mg/dL      Glucose 124 mg/dL      Calcium 9.3 mg/dL      AST 24 U/L      ALT 21 U/L      Alkaline Phosphatase 98 U/L      Total Protein 7.3 g/dL      Albumin 4.5 g/dL      Total Bilirubin 0.71 mg/dL      eGFR 61 ml/min/1.73sq m     Narrative:      National Kidney Disease Foundation guidelines for Chronic Kidney Disease (CKD):     Stage 1 with normal or high GFR (GFR > 90 mL/min/1.73 square meters)    Stage 2 Mild CKD (GFR = 60-89 mL/min/1.73 square meters)    Stage 3A Moderate CKD (GFR = 45-59 mL/min/1.73 square meters)    Stage 3B Moderate CKD (GFR = 30-44 mL/min/1.73 square meters)    Stage 4 Severe CKD (GFR = 15-29 mL/min/1.73 square meters)    Stage 5 End Stage CKD (GFR <15 mL/min/1.73 square meters)  Note: GFR calculation is accurate only with a steady state creatinine    Lipase [[id number]]  (Normal) Collected: 05/18/25 1410    Lab Status: Final result Specimen: Blood from Arm, Left Updated: 05/18/25 1432     Lipase 75 u/L     Magnesium [[id number]]  (Normal) Collected: 05/18/25 1410    Lab Status: Final result Specimen: Blood from Arm, Left Updated: 05/18/25 1432     Magnesium 1.9 mg/dL     CBC and differential [[id number]]  (Abnormal) Collected: 05/18/25 1410    Lab Status: Final result Specimen: Blood from Arm, Left Updated: 05/18/25 1429     WBC 12.88 Thousand/uL      RBC 5.60 Million/uL      Hemoglobin 17.4 g/dL      Hematocrit 50.8 %      MCV 91 fL      MCH 31.1 pg      MCHC 34.3 g/dL      RDW 13.8 %      MPV 9.7 fL      Platelets 254 Thousands/uL             No orders to display       Procedures    ED Medication and  Procedure Management   Prior to Admission Medications   Prescriptions Last Dose Informant Patient Reported? Taking?   Diclofenac Sodium (VOLTAREN) 1 %   No No   Sig: Apply 2 g topically 4 (four) times a day   Imbruvica 140 MG CAPS   No No   Sig: Take 1 capsule (140 mg total) by mouth 2 (two) times a day   acetaminophen (TYLENOL) 325 mg tablet   No No   Sig: Take 2 tablets (650 mg total) by mouth every 6 (six) hours as needed for mild pain   apixaban (ELIQUIS) 5 mg   No No   Sig: Take 1 tablet (5 mg total) by mouth 2 (two) times a day   dextromethorphan-guaiFENesin (ROBITUSSIN DM)  mg/5 mL syrup   No No   Sig: Take 5 mL by mouth 3 (three) times a day as needed for congestion or cough   digoxin (LANOXIN) 0.125 mg tablet   No No   Sig: TAKE 1 TABLET BY MOUTH EVERY DAY   hydrOXYzine HCL (ATARAX) 25 mg tablet   No No   Sig: Take 1 tablet (25 mg total) by mouth daily at bedtime   lidocaine (Lidoderm) 5 %   No No   Sig: Apply 1 patch topically over 12 hours daily Remove & Discard patch within 12 hours or as directed by MD   nebivolol (Bystolic) 10 mg tablet   No No   Sig: Take 1 tablet (10 mg total) by mouth daily   ofloxacin (FLOXIN) 0.3 % otic solution   No No   Sig: Administer 5 drops into the left ear 2 (two) times a day   predniSONE 5 mg tablet   No No   Sig: Take 1 tablet (5 mg total) by mouth daily with breakfast   rosuvastatin (CRESTOR) 5 mg tablet   No No   Sig: Take 1 tablet (5 mg total) by mouth daily   tamsulosin (FLOMAX) 0.4 mg   No No   Sig: TAKE 1 CAPSULE BY MOUTH EVERY DAY WITH DINNER   traMADol (Ultram) 50 mg tablet   No No   Sig: Take 1 tablet (50 mg total) by mouth daily as needed for moderate pain   valsartan (DIOVAN) 40 mg tablet   No No   Sig: Take 1 tablet (40 mg total) by mouth daily      Facility-Administered Medications Last Administration Doses Remaining   cyanocobalamin injection 1,000 mcg 8/14/2023  4:00 PM         Discharge Medication List as of 5/18/2025  2:58 PM        START taking  these medications    Details   ondansetron (ZOFRAN-ODT) 4 mg disintegrating tablet Take 1 tablet (4 mg total) by mouth every 8 (eight) hours as needed for nausea for up to 10 doses, Starting Sun 5/18/2025, Print           CONTINUE these medications which have NOT CHANGED    Details   acetaminophen (TYLENOL) 325 mg tablet Take 2 tablets (650 mg total) by mouth every 6 (six) hours as needed for mild pain, Starting Tue 10/24/2023, OTC      apixaban (ELIQUIS) 5 mg Take 1 tablet (5 mg total) by mouth 2 (two) times a day, Starting Thu 11/21/2024, Until Sun 11/16/2025, Normal      dextromethorphan-guaiFENesin (ROBITUSSIN DM)  mg/5 mL syrup Take 5 mL by mouth 3 (three) times a day as needed for congestion or cough, Starting Mon 3/10/2025, Normal      Diclofenac Sodium (VOLTAREN) 1 % Apply 2 g topically 4 (four) times a day, Starting Thu 11/21/2024, Normal      digoxin (LANOXIN) 0.125 mg tablet TAKE 1 TABLET BY MOUTH EVERY DAY, Starting Mon 4/21/2025, Normal      hydrOXYzine HCL (ATARAX) 25 mg tablet Take 1 tablet (25 mg total) by mouth daily at bedtime, Starting Tue 4/1/2025, Normal      Imbruvica 140 MG CAPS Take 1 capsule (140 mg total) by mouth 2 (two) times a day, Starting Tue 7/19/2022, Normal      lidocaine (Lidoderm) 5 % Apply 1 patch topically over 12 hours daily Remove & Discard patch within 12 hours or as directed by MD, Starting Fri 9/13/2024, Normal      nebivolol (Bystolic) 10 mg tablet Take 1 tablet (10 mg total) by mouth daily, Starting Sat 3/1/2025, Normal      ofloxacin (FLOXIN) 0.3 % otic solution Administer 5 drops into the left ear 2 (two) times a day, Starting Tue 1/7/2025, Normal      predniSONE 5 mg tablet Take 1 tablet (5 mg total) by mouth daily with breakfast, Starting Mon 3/10/2025, Normal      rosuvastatin (CRESTOR) 5 mg tablet Take 1 tablet (5 mg total) by mouth daily, Starting Fri 4/11/2025, Normal      tamsulosin (FLOMAX) 0.4 mg TAKE 1 CAPSULE BY MOUTH EVERY DAY WITH DINNER, Starting Thu  3/20/2025, Normal      traMADol (Ultram) 50 mg tablet Take 1 tablet (50 mg total) by mouth daily as needed for moderate pain, Starting Thu 9/12/2024, Normal      valsartan (DIOVAN) 40 mg tablet Take 1 tablet (40 mg total) by mouth daily, Starting Sat 3/1/2025, Normal           No discharge procedures on file.  ED SEPSIS DOCUMENTATION   Time reflects when diagnosis was documented in both MDM as applicable and the Disposition within this note       Time User Action Codes Description Comment    5/18/2025  2:56 PM Anita Bhatia Add [K52.9] Gastroenteritis                      [1]   Social History  Tobacco Use    Smoking status: Never    Smokeless tobacco: Never    Tobacco comments:     No passive smoke exposure   Vaping Use    Vaping status: Never Used   Substance Use Topics    Alcohol use: Yes    Drug use: No        Anita Bhatia DO  05/18/25 9146

## 2025-05-18 NOTE — DISCHARGE INSTRUCTIONS
Zofran as needed for nausea  Stay well hydrated    Return to the ED if new/worsening symptoms including but not limited to intractable vomiting, dehydration, lightheadedness, passing out, abdominal pain, headache, etc.

## 2025-06-05 ENCOUNTER — RA CDI HCC (OUTPATIENT)
Dept: OTHER | Facility: HOSPITAL | Age: 80
End: 2025-06-05

## 2025-06-17 DIAGNOSIS — I10 ESSENTIAL HYPERTENSION, MALIGNANT: ICD-10-CM

## 2025-06-17 DIAGNOSIS — N40.0 ENLARGED PROSTATE: ICD-10-CM

## 2025-06-17 RX ORDER — TAMSULOSIN HYDROCHLORIDE 0.4 MG/1
0.4 CAPSULE ORAL
Qty: 90 CAPSULE | Refills: 0 | Status: SHIPPED | OUTPATIENT
Start: 2025-06-17

## 2025-06-17 RX ORDER — NEBIVOLOL 10 MG/1
10 TABLET ORAL DAILY
Qty: 90 TABLET | Refills: 0 | Status: SHIPPED | OUTPATIENT
Start: 2025-06-17

## 2025-07-14 ENCOUNTER — OFFICE VISIT (OUTPATIENT)
Dept: CARDIOLOGY CLINIC | Facility: CLINIC | Age: 80
End: 2025-07-14
Payer: MEDICARE

## 2025-07-14 VITALS
BODY MASS INDEX: 27 KG/M2 | DIASTOLIC BLOOD PRESSURE: 68 MMHG | OXYGEN SATURATION: 94 % | SYSTOLIC BLOOD PRESSURE: 120 MMHG | HEIGHT: 66 IN | HEART RATE: 59 BPM | WEIGHT: 168 LBS

## 2025-07-14 DIAGNOSIS — I10 PRIMARY HYPERTENSION: ICD-10-CM

## 2025-07-14 DIAGNOSIS — I34.0 MODERATE MITRAL VALVE REGURGITATION: ICD-10-CM

## 2025-07-14 DIAGNOSIS — I73.9 PERIPHERAL VASCULAR DISEASE (HCC): ICD-10-CM

## 2025-07-14 DIAGNOSIS — I51.9 ASYMPTOMATIC LEFT VENTRICULAR SYSTOLIC DYSFUNCTION (LVSD): ICD-10-CM

## 2025-07-14 DIAGNOSIS — K21.9 GASTROESOPHAGEAL REFLUX DISEASE WITHOUT ESOPHAGITIS: ICD-10-CM

## 2025-07-14 DIAGNOSIS — E78.2 MIXED HYPERLIPIDEMIA: ICD-10-CM

## 2025-07-14 DIAGNOSIS — I48.11 LONGSTANDING PERSISTENT ATRIAL FIBRILLATION (HCC): Primary | ICD-10-CM

## 2025-07-14 PROCEDURE — 99214 OFFICE O/P EST MOD 30 MIN: CPT | Performed by: INTERNAL MEDICINE

## 2025-07-14 PROCEDURE — G2211 COMPLEX E/M VISIT ADD ON: HCPCS | Performed by: INTERNAL MEDICINE

## 2025-07-14 RX ORDER — DIGOXIN 125 MCG
125 TABLET ORAL 3 TIMES WEEKLY
Qty: 45 TABLET | Refills: 3 | Status: SHIPPED | OUTPATIENT
Start: 2025-07-14

## 2025-07-14 RX ORDER — IBRUTINIB 70 MG/1
1 CAPSULE ORAL DAILY
COMMUNITY
Start: 2025-07-07

## 2025-07-14 NOTE — PATIENT INSTRUCTIONS
Assessment & Plan  Longstanding persistent atrial fibrillation (HCC)  Peripheral vascular disease (HCC)  Moderate mitral valve regurgitation  Mixed hyperlipidemia  Primary hypertension  Asymptomatic left ventricular systolic dysfunction (LVSD)    Has been overall doing well with no recent symptoms that suggest rapid heart rate due to his atrial fibrillation or decompensated heart failure.  Is on chronic anticoagulation.  Blood pressure is well-controlled.  Physical examination did not end-fire pulmonary or peripheral vascular congestion.  Has not had echocardiogram since 2021.  At that time LV function was moderately reduced.  He is currently not on diuretic therapy.  Appears euvolemic on exam.    Advising to continue current medications.  Advising him to undergo echocardiogram.  This has been ordered multiple times but has not been performed yet.  Advising to reach out to us if he develops any worsening palpitations or any shortness of breath or swelling in the lower extremities or presyncope/syncope.

## 2025-07-14 NOTE — ASSESSMENT & PLAN NOTE
Has been overall doing well with no recent symptoms that suggest rapid heart rate due to his atrial fibrillation or decompensated heart failure.  Is on chronic anticoagulation.  Blood pressure is well-controlled.  Physical examination did not end-fire pulmonary or peripheral vascular congestion.  Has not had echocardiogram since 2021.  At that time LV function was moderately reduced.  He is currently not on diuretic therapy.  Appears euvolemic on exam.    Advising to continue current medications.  Advising him to undergo echocardiogram.  This has been ordered multiple times but has not been performed yet.  Advising to reach out to us if he develops any worsening palpitations or any shortness of breath or swelling in the lower extremities or presyncope/syncope.

## 2025-07-14 NOTE — PROGRESS NOTES
Name: Jose Cruz Alejandro      : 1945      MRN: [id number]  Encounter Provider: Mely Sanchez MD  Encounter Date: 2025   Encounter department: [facility]ARDIOLOG[facility]    DIAGNOSES:  1. Atrial flutter and atrial fibrillation (initially diagnosed 2020)- status post cardioversion in 2020 followed by recurrence, now persistent atrial fibrillation  2. Chronic lymphocytic leukemia initially diagnosed in , now with relapse, currently on ibrutinib and rituximab  3. Intracranial meningioma along the undersurface of right cerebellar tentorium diagnosed in   4. Degenerative joint disease with arthritis  5. Chronic gout  6. Benign prostatic hypertrophy  7. Essential hypertension  8. Cardiomyopathy, unspecified, heart failure with mid range ejection fraction, EF 46 percent  9. Mitral valve regurgitation  10. Pulmonary hypertension    ECHOCARDIOGRAM 2021: Dilated left ventricular cavity, LVIDd 5.8 cm, mild to moderately reduced left ventricular systolic function with global hypokinesis, EF 40%, mild left atrial cavity enlargement, bowing interatrial septum towards right side, mild-to-moderate mitral valve regurgitation, mild-to-moderate tricuspid valve regurgitation, trace pulmonic valve regurgitation.     HOLTER MONITOR 2021 :  Patient was in atrial fibrillation rhythm throughout with average heart rate of 78 and minimum heart rate of 40 and maximum heart rate of 178 beats per minute.  There was no significant bradyarrhythmia or pause events.  There were rare ventricular ectopic beats.    ECHOCARDIOGRAM March 3, 2020, mildly reduced left ventricular systolic function with global hypokinesis, EF of 46 percent, mild left ventricular cavity enlargement, indeterminate diastolic dysfunction, mild left atrial cavity enlargement, moderate mitral valve regurgitation, mild mitral annular calcification, mild to moderate tricuspid valve and trace pulmonic valve regurgitation, mild  pulmonary hypertension with estimated RVSP/PASP 42 mmHg, no pericardial effusion.      Assessment & Plan  Longstanding persistent atrial fibrillation (HCC)  Peripheral vascular disease (HCC)  Moderate mitral valve regurgitation  Mixed hyperlipidemia  Primary hypertension  Asymptomatic left ventricular systolic dysfunction (LVSD)    Has been overall doing well with no recent symptoms that suggest rapid heart rate due to his atrial fibrillation or decompensated heart failure.  Is on chronic anticoagulation.  Blood pressure is well-controlled.  Physical examination did not end-fire pulmonary or peripheral vascular congestion.  Has not had echocardiogram since 2021.  At that time LV function was moderately reduced.  He is currently not on diuretic therapy.  Appears euvolemic on exam.    Advising to continue current medications.  Advising him to undergo echocardiogram.  This has been ordered multiple times but has not been performed yet.  Advising to reach out to us if he develops any worsening palpitations or any shortness of breath or swelling in the lower extremities or presyncope/syncope.               History of Present Illness   HPI  Jose Cruz Alejandro is a 79 y.o. male who presents for follow-up regarding his cardiac comorbidities which include:  Atrial flutter and atrial fibrillation, cardiomyopathy, hypertension and other comorbidities.  He was last seen by me in March 2024.     History obtained from: patient    He is here for visit accompanied with his wife.  He mentions that since last year he has had no new major diagnosis or hospitalizations or significant illnesses.  From a symptom perspective he denies any unusual chest pain or shortness of breath or dizziness or lightness or palpitations.  Denies any orthopnea or PND or pedal edema.  The only thing he reports that he notices numbness and tingling in his wrist and hand especially on the ulnar side.  This happens almost daily.  Physically very active.  Denies  any recent decline in his exercise tolerance.    Functional capacity status:  Good   (Excellent- >10 METs; Good: (7-10 METs); Moderate (4-7 METs); Poor (<= 4 METs)     Any chronic stressors:  None   (feeling of poor health, financial problems, and social isolation etc).     Tobacco or alcohol dependence:  He has never been a smoker.  He has 1 alcoholic drink a week    Family history of ASCVD: His father had coronary artery disease and  prematurely in his 60s.    Medications   Current cardiac medications: Valsartan 40 mg daily, nebivolol 10 mg daily, digoxin 125 mcg  and Friday.  He takes Eliquis 5 mg twice a day.     Current Medications[1]    Last comprehensive blood work available:   Blood work 2025 sodium 137 potassium 4.6 chloride 103 bicarb 25 BUN 23 creatinine 1.14 GFR 75  Normal LFTs  WBC 12.88 hemoglobin 17.4 hematocrit 50.8 platelet count 254  TSH 2.7 in   Hemoglobin A1c 5.8 on 2025  Lipid profile 2023  TG 82 HDL 68 Calc     ASCVD risk assessment:     The 10-year ASCVD risk score (Mira ARNOLD, et al., 2019) is: 48.5%    Values used to calculate the score:      Age: 79 years      Clincally relevant sex: Male      Is Non- : No      Diabetic: Yes      Tobacco smoker: No      Systolic Blood Pressure: 120 mmHg      Is BP treated: Yes      HDL Cholesterol: 68 mg/dL      Total Cholesterol: 225 mg/dL  (Low risk: Less than <5%; borderline risk: >=5% to <7.5%; intermediate risk: >=7.5% to <20%; high risk:>=20%)  Patient's ASCVD risk category: High risk    Major cardiac risk factors: Hypertension, family history of coronary heart disease (Tobacco use, Hypertension, Family history of CHD, Primary severe hypercholesterolemia, DM, multiple major and risk enhancing factors)     Any cardiac clinical risk enhancers from available data: -- (Family history of premature CAD, patient's history of chronic kidney disease, primary hypercholesterolemia,  "metabolic syndrome, abnormal MEHRAN, inflammatory condition such as RA-HIV-psoriasis, RA-HIV-Psoriasis,, pregnancy related complications such as preeclampsia or premature delivery, early menopause, high risk ethnicity such as [place] , social deprivation, physical inactivity, nonalcoholic fatty liver disease psychosocial stress, major psychiatric illness, atrial fibrillation, left ventricular hypertrophy, obstructive sleep apnea, nonalcoholic fatty liver disease).    ECG / Cardiac device data   No results found for this visit on 07/14/25.      Allergies     Allergies   Allergen Reactions    Tetanus Antitoxin Rash     Patient told by mother medication was given as a child    Tetanus Toxoids Other (See Comments)     Not specified       *-*-*-*-*-*-*-*-*-*-*-*-*-*-*-*-*-*-*-*-*-*-*-*-*-*-*-*-*-*-*-*-*-*-*-*-*-*-*-*-*-*-*-*-*-*-*-*-*-*-*-*-*-*-    Review of Systems   Constitutional: Negative.    HENT: Negative.     Respiratory:  Negative for shortness of breath.    Cardiovascular: Negative.  Negative for chest pain, palpitations and leg swelling.   Gastrointestinal: Negative.    Skin: Negative.    Neurological:  Negative for dizziness and headaches.   Hematological: Negative.    Psychiatric/Behavioral: Negative.  Negative for agitation.    All other systems reviewed and are negative.         Objective   /68 (BP Location: Right arm, Patient Position: Sitting, Cuff Size: Adult)   Pulse 59   Ht 5' 6\" (1.676 m)   Wt 76.2 kg (168 lb) Comment: verbal by patient  SpO2 94%   BMI 27.12 kg/m²      Physical Exam  Constitutional:       Appearance: Normal appearance.   HENT:      Mouth/Throat:      Mouth: Mucous membranes are moist.     Cardiovascular:      Rate and Rhythm: Normal rate. Rhythm irregular.      Heart sounds: Normal heart sounds.   Pulmonary:      Effort: No respiratory distress.      Breath sounds: No wheezing, rhonchi or rales.   Abdominal:      Palpations: Abdomen is soft.     Musculoskeletal:      " Right lower leg: No edema.      Left lower leg: No edema.     Skin:     General: Skin is dry.     Neurological:      Mental Status: He is alert and oriented to person, place, and time. Mental status is at baseline.     Psychiatric:         Mood and Affect: Mood normal.         Behavior: Behavior normal.                  [1]   Current Outpatient Medications:     acetaminophen (TYLENOL) 325 mg tablet, Take 2 tablets (650 mg total) by mouth every 6 (six) hours as needed for mild pain, Disp: , Rfl: 0    apixaban (ELIQUIS) 5 mg, Take 1 tablet (5 mg total) by mouth 2 (two) times a day, Disp: 180 tablet, Rfl: 3    digoxin (LANOXIN) 0.125 mg tablet, TAKE 1 TABLET BY MOUTH EVERY DAY, Disp: 45 tablet, Rfl: 0    Imbruvica 70 MG CAPS, Take 1 capsule by mouth in the morning, Disp: , Rfl:     nebivolol (Bystolic) 10 mg tablet, Take 1 tablet (10 mg total) by mouth daily, Disp: 90 tablet, Rfl: 0    tamsulosin (FLOMAX) 0.4 mg, Take 1 capsule (0.4 mg total) by mouth daily with dinner, Disp: 90 capsule, Rfl: 0    traMADol (Ultram) 50 mg tablet, Take 1 tablet (50 mg total) by mouth daily as needed for moderate pain, Disp: 30 tablet, Rfl: 0    valsartan (DIOVAN) 40 mg tablet, Take 1 tablet (40 mg total) by mouth daily (Patient taking differently: Take 40 mg by mouth if needed), Disp: 180 tablet, Rfl: 3    dextromethorphan-guaiFENesin (ROBITUSSIN DM)  mg/5 mL syrup, Take 5 mL by mouth 3 (three) times a day as needed for congestion or cough (Patient not taking: Reported on 7/14/2025), Disp: 118 mL, Rfl: 1    Diclofenac Sodium (VOLTAREN) 1 %, Apply 2 g topically 4 (four) times a day (Patient not taking: Reported on 7/14/2025), Disp: 240 g, Rfl: 2    hydrOXYzine HCL (ATARAX) 25 mg tablet, Take 1 tablet (25 mg total) by mouth daily at bedtime (Patient not taking: Reported on 7/14/2025), Disp: 30 tablet, Rfl: 3    Imbruvica 140 MG CAPS, Take 1 capsule (140 mg total) by mouth 2 (two) times a day (Patient not taking: Reported on  7/14/2025), Disp: 180 capsule, Rfl: 3    lidocaine (Lidoderm) 5 %, Apply 1 patch topically over 12 hours daily Remove & Discard patch within 12 hours or as directed by MD (Patient not taking: Reported on 7/14/2025), Disp: 30 patch, Rfl: 0    ofloxacin (FLOXIN) 0.3 % otic solution, Administer 5 drops into the left ear 2 (two) times a day (Patient not taking: Reported on 7/14/2025), Disp: 15 mL, Rfl: 0    ondansetron (ZOFRAN-ODT) 4 mg disintegrating tablet, Take 1 tablet (4 mg total) by mouth every 8 (eight) hours as needed for nausea for up to 10 doses (Patient not taking: Reported on 7/14/2025), Disp: 10 tablet, Rfl: 0    predniSONE 5 mg tablet, Take 1 tablet (5 mg total) by mouth daily with breakfast (Patient not taking: Reported on 7/14/2025), Disp: 30 tablet, Rfl: 1    rosuvastatin (CRESTOR) 5 mg tablet, Take 1 tablet (5 mg total) by mouth daily (Patient not taking: Reported on 7/14/2025), Disp: 90 tablet, Rfl: 0    Current Facility-Administered Medications:     cyanocobalamin injection 1,000 mcg, 1,000 mcg, Intramuscular, Q30 Days, Dashawn Starkey MD, 1,000 mcg at 08/14/23 1600

## 2025-07-17 DIAGNOSIS — I50.42 CHRONIC COMBINED SYSTOLIC AND DIASTOLIC HEART FAILURE (HCC): ICD-10-CM

## 2025-07-17 DIAGNOSIS — I48.20 CHRONIC ATRIAL FIBRILLATION (HCC): ICD-10-CM

## 2025-07-17 DIAGNOSIS — I10 ESSENTIAL HYPERTENSION, MALIGNANT: ICD-10-CM

## 2025-07-17 DIAGNOSIS — E11.69 HYPERLIPIDEMIA ASSOCIATED WITH TYPE 2 DIABETES MELLITUS  (HCC): ICD-10-CM

## 2025-07-17 DIAGNOSIS — E78.5 HYPERLIPIDEMIA ASSOCIATED WITH TYPE 2 DIABETES MELLITUS  (HCC): ICD-10-CM

## 2025-07-17 DIAGNOSIS — N40.0 ENLARGED PROSTATE: ICD-10-CM

## 2025-07-17 DIAGNOSIS — M35.3 PMR (POLYMYALGIA RHEUMATICA) (HCC): ICD-10-CM

## 2025-07-18 DIAGNOSIS — E78.5 HYPERLIPIDEMIA ASSOCIATED WITH TYPE 2 DIABETES MELLITUS  (HCC): ICD-10-CM

## 2025-07-18 DIAGNOSIS — E11.69 HYPERLIPIDEMIA ASSOCIATED WITH TYPE 2 DIABETES MELLITUS  (HCC): ICD-10-CM

## 2025-07-18 RX ORDER — PREDNISONE 5 MG/1
5 TABLET ORAL
Qty: 30 TABLET | Refills: 0 | OUTPATIENT
Start: 2025-07-18

## 2025-07-18 RX ORDER — TAMSULOSIN HYDROCHLORIDE 0.4 MG/1
0.4 CAPSULE ORAL
Qty: 90 CAPSULE | Refills: 0 | OUTPATIENT
Start: 2025-07-18

## 2025-07-18 RX ORDER — NEBIVOLOL 10 MG/1
10 TABLET ORAL DAILY
Qty: 90 TABLET | Refills: 0 | OUTPATIENT
Start: 2025-07-18

## 2025-07-18 RX ORDER — VALSARTAN 40 MG/1
40 TABLET ORAL DAILY
Qty: 90 TABLET | Refills: 1 | Status: SHIPPED | OUTPATIENT
Start: 2025-07-18

## 2025-07-18 RX ORDER — ROSUVASTATIN CALCIUM 5 MG/1
5 TABLET, COATED ORAL DAILY
Qty: 90 TABLET | Refills: 3 | Status: SHIPPED | OUTPATIENT
Start: 2025-07-18

## 2025-07-18 RX ORDER — ROSUVASTATIN CALCIUM 5 MG/1
5 TABLET, COATED ORAL DAILY
Qty: 90 TABLET | Refills: 0 | OUTPATIENT
Start: 2025-07-18

## 2025-08-07 ENCOUNTER — OFFICE VISIT (OUTPATIENT)
Dept: FAMILY MEDICINE CLINIC | Facility: CLINIC | Age: 80
End: 2025-08-07
Payer: MEDICARE

## 2025-08-07 VITALS
TEMPERATURE: 97.6 F | DIASTOLIC BLOOD PRESSURE: 78 MMHG | HEIGHT: 66 IN | BODY MASS INDEX: 24.3 KG/M2 | WEIGHT: 151.2 LBS | RESPIRATION RATE: 16 BRPM | OXYGEN SATURATION: 98 % | SYSTOLIC BLOOD PRESSURE: 138 MMHG | HEART RATE: 89 BPM

## 2025-08-07 DIAGNOSIS — N40.0 ENLARGED PROSTATE: ICD-10-CM

## 2025-08-07 DIAGNOSIS — I48.20 CHRONIC ATRIAL FIBRILLATION (HCC): ICD-10-CM

## 2025-08-07 DIAGNOSIS — E78.5 HYPERLIPIDEMIA ASSOCIATED WITH TYPE 2 DIABETES MELLITUS  (HCC): ICD-10-CM

## 2025-08-07 DIAGNOSIS — G62.9 NEUROPATHY: Primary | ICD-10-CM

## 2025-08-07 DIAGNOSIS — K21.9 GASTROESOPHAGEAL REFLUX DISEASE WITHOUT ESOPHAGITIS: ICD-10-CM

## 2025-08-07 DIAGNOSIS — I10 ESSENTIAL HYPERTENSION, MALIGNANT: ICD-10-CM

## 2025-08-07 DIAGNOSIS — D32.9 MENINGIOMA (HCC): ICD-10-CM

## 2025-08-07 DIAGNOSIS — I50.42 CHRONIC COMBINED SYSTOLIC AND DIASTOLIC HEART FAILURE (HCC): ICD-10-CM

## 2025-08-07 DIAGNOSIS — I51.9 ASYMPTOMATIC LEFT VENTRICULAR SYSTOLIC DYSFUNCTION (LVSD): ICD-10-CM

## 2025-08-07 DIAGNOSIS — I48.11 LONGSTANDING PERSISTENT ATRIAL FIBRILLATION (HCC): ICD-10-CM

## 2025-08-07 DIAGNOSIS — E11.69 HYPERLIPIDEMIA ASSOCIATED WITH TYPE 2 DIABETES MELLITUS  (HCC): ICD-10-CM

## 2025-08-07 PROBLEM — I73.9 PERIPHERAL VASCULAR DISEASE (HCC): Status: RESOLVED | Noted: 2021-05-13 | Resolved: 2025-08-07

## 2025-08-07 PROBLEM — I42.8 NONISCHEMIC CARDIOMYOPATHY (HCC): Status: RESOLVED | Noted: 2020-03-03 | Resolved: 2025-08-07

## 2025-08-07 LAB — SL AMB POCT HEMOGLOBIN AIC: 5.8 (ref ?–6.5)

## 2025-08-07 PROCEDURE — 83036 HEMOGLOBIN GLYCOSYLATED A1C: CPT | Performed by: INTERNAL MEDICINE

## 2025-08-07 PROCEDURE — G2211 COMPLEX E/M VISIT ADD ON: HCPCS | Performed by: INTERNAL MEDICINE

## 2025-08-07 PROCEDURE — 99214 OFFICE O/P EST MOD 30 MIN: CPT | Performed by: INTERNAL MEDICINE

## 2025-08-07 RX ORDER — ROSUVASTATIN CALCIUM 5 MG/1
5 TABLET, COATED ORAL DAILY
Qty: 90 TABLET | Refills: 3 | Status: SHIPPED | OUTPATIENT
Start: 2025-08-07

## 2025-08-07 RX ORDER — NEBIVOLOL 10 MG/1
10 TABLET ORAL DAILY
Qty: 90 TABLET | Refills: 3 | Status: SHIPPED | OUTPATIENT
Start: 2025-08-07

## 2025-08-07 RX ORDER — TAMSULOSIN HYDROCHLORIDE 0.4 MG/1
0.4 CAPSULE ORAL
Qty: 90 CAPSULE | Refills: 3 | Status: SHIPPED | OUTPATIENT
Start: 2025-08-07

## 2025-08-07 RX ORDER — DIGOXIN 125 MCG
125 TABLET ORAL 3 TIMES WEEKLY
Qty: 45 TABLET | Refills: 3 | Status: SHIPPED | OUTPATIENT
Start: 2025-08-08

## 2025-08-07 RX ORDER — VALSARTAN 40 MG/1
40 TABLET ORAL DAILY
Qty: 90 TABLET | Refills: 3 | Status: SHIPPED | OUTPATIENT
Start: 2025-08-07

## 2025-08-19 ENCOUNTER — TELEPHONE (OUTPATIENT)
Age: 80
End: 2025-08-19

## 2025-08-19 DIAGNOSIS — M54.16 LUMBAR RADICULOPATHY: Primary | ICD-10-CM

## 2025-08-19 DIAGNOSIS — G62.9 NEUROPATHY: ICD-10-CM

## 2025-08-22 DIAGNOSIS — M54.16 LUMBAR RADICULOPATHY: ICD-10-CM

## 2025-08-22 DIAGNOSIS — M25.551 ACUTE PAIN OF RIGHT HIP: ICD-10-CM

## 2025-08-22 RX ORDER — TRAMADOL HYDROCHLORIDE 50 MG/1
50 TABLET ORAL EVERY 8 HOURS PRN
Qty: 30 TABLET | Refills: 0 | Status: SHIPPED | OUTPATIENT
Start: 2025-08-22